# Patient Record
Sex: MALE | Race: WHITE
[De-identification: names, ages, dates, MRNs, and addresses within clinical notes are randomized per-mention and may not be internally consistent; named-entity substitution may affect disease eponyms.]

---

## 2017-01-24 ENCOUNTER — HOSPITAL ENCOUNTER (OUTPATIENT)
Dept: HOSPITAL 53 - M LAB REF | Age: 82
End: 2017-01-24
Attending: INTERNAL MEDICINE
Payer: MEDICARE

## 2017-01-24 DIAGNOSIS — D46.9: Primary | ICD-10-CM

## 2017-01-24 LAB
FERRITIN SERPL-MCNC: 489 NG/ML (ref 26–388)
IRON SATN MFR SERPL: 44.5 % (ref 19.7–37.4)
TIBC SERPL-MCNC: 254 UG/DL (ref 250–450)

## 2017-04-18 ENCOUNTER — HOSPITAL ENCOUNTER (OUTPATIENT)
Dept: HOSPITAL 53 - M LABDRAW1 | Age: 82
End: 2017-04-18
Attending: FAMILY MEDICINE
Payer: MEDICARE

## 2017-04-18 DIAGNOSIS — E11.9: ICD-10-CM

## 2017-04-18 DIAGNOSIS — E55.9: Primary | ICD-10-CM

## 2017-04-18 LAB
ALBUMIN SERPL BCG-MCNC: 3.4 GM/DL (ref 3.2–5.2)
ALBUMIN/GLOB SERPL: 1.26 {RATIO} (ref 1–1.93)
ALP SERPL-CCNC: 64 U/L (ref 45–117)
ALT SERPL W P-5'-P-CCNC: 20 U/L (ref 12–78)
ANION GAP SERPL CALC-SCNC: 6 MEQ/L (ref 8–16)
AST SERPL-CCNC: 18 U/L (ref 15–37)
BILIRUB SERPL-MCNC: 0.7 MG/DL (ref 0.2–1)
BUN SERPL-MCNC: 19 MG/DL (ref 7–18)
CALCIUM SERPL-MCNC: 8.2 MG/DL (ref 8.8–10.2)
CHLORIDE SERPL-SCNC: 107 MEQ/L (ref 98–107)
CHOLEST SERPL-MCNC: 198 MG/DL (ref ?–200)
CO2 SERPL-SCNC: 28 MEQ/L (ref 21–32)
CREAT SERPL-MCNC: 0.94 MG/DL (ref 0.7–1.3)
ERYTHROCYTE [DISTWIDTH] IN BLOOD BY AUTOMATED COUNT: 15.4 % (ref 11.5–14.5)
EST. AVERAGE GLUCOSE BLD GHB EST-MCNC: 120 MG/DL (ref 60–110)
GFR SERPL CREATININE-BSD FRML MDRD: > 60 ML/MIN/{1.73_M2} (ref 35–?)
GLUCOSE SERPL-MCNC: 126 MG/DL (ref 83–110)
MCH RBC QN AUTO: 33.4 PG (ref 27–33)
MCHC RBC AUTO-ENTMCNC: 32.6 G/DL (ref 32–36.5)
MCV RBC AUTO: 102.4 FL (ref 80–96)
PLATELET # BLD AUTO: 179 K/MM3 (ref 150–450)
POTASSIUM SERPL-SCNC: 4.2 MEQ/L (ref 3.5–5.1)
PROT SERPL-MCNC: 6.1 GM/DL (ref 6.4–8.2)
SODIUM SERPL-SCNC: 141 MEQ/L (ref 136–145)
TRIGL SERPL-MCNC: 115 MG/DL (ref ?–150)
WBC # BLD AUTO: 7.4 K/MM3 (ref 4–10)

## 2017-07-19 ENCOUNTER — HOSPITAL ENCOUNTER (OUTPATIENT)
Dept: HOSPITAL 53 - M LAB REF | Age: 82
End: 2017-07-19
Attending: INTERNAL MEDICINE
Payer: MEDICARE

## 2017-07-19 DIAGNOSIS — D46.9: Primary | ICD-10-CM

## 2017-07-19 LAB
FERRITIN SERPL-MCNC: 447 NG/ML (ref 26–388)
IRON SATN MFR SERPL: 33.5 % (ref 19.7–37.4)
TIBC SERPL-MCNC: 245 UG/DL (ref 250–450)

## 2017-10-19 ENCOUNTER — HOSPITAL ENCOUNTER (OUTPATIENT)
Dept: HOSPITAL 53 - M LAB REF | Age: 82
End: 2017-10-19
Attending: INTERNAL MEDICINE
Payer: MEDICARE

## 2017-10-19 DIAGNOSIS — D46.9: Primary | ICD-10-CM

## 2017-10-19 LAB
FERRITIN SERPL-MCNC: 531 NG/ML (ref 26–388)
IRON SATN MFR SERPL: 35.3 % (ref 19.7–50)
TIBC SERPL-MCNC: 241 UG/DL (ref 250–450)

## 2018-05-15 ENCOUNTER — HOSPITAL ENCOUNTER (OUTPATIENT)
Dept: HOSPITAL 53 - M LABDRAW1 | Age: 83
End: 2018-05-15
Attending: FAMILY MEDICINE
Payer: MEDICARE

## 2018-05-15 DIAGNOSIS — E11.9: ICD-10-CM

## 2018-05-15 DIAGNOSIS — E55.9: Primary | ICD-10-CM

## 2018-05-15 DIAGNOSIS — C61: ICD-10-CM

## 2018-05-15 LAB
25(OH)D3 SERPL-MCNC: 80.5 NG/ML (ref 30–100)
ALBUMIN/GLOBULIN RATIO: 1.23 (ref 1–1.93)
ALBUMIN: 3.2 GM/DL (ref 3.2–5.2)
ALKALINE PHOSPHATASE: 56 U/L (ref 45–117)
ALT SERPL W P-5'-P-CCNC: 22 U/L (ref 12–78)
ANION GAP: 6 MEQ/L (ref 8–16)
AST SERPL-CCNC: 19 U/L (ref 7–37)
BASO #: 0 10^3/UL (ref 0–0.2)
BASO %: 0.2 % (ref 0–1)
BILIRUBIN,TOTAL: 0.8 MG/DL (ref 0.2–1)
BLOOD UREA NITROGEN: 28 MG/DL (ref 7–18)
CALCIUM LEVEL: 8.2 MG/DL (ref 8.8–10.2)
CARBON DIOXIDE LEVEL: 28 MEQ/L (ref 21–32)
CHLORIDE LEVEL: 108 MEQ/L (ref 98–107)
CREATININE FOR GFR: 1.06 MG/DL (ref 0.7–1.3)
EOS #: 0 10^3/UL (ref 0–0.5)
EOSINOPHIL NFR BLD AUTO: 0 % (ref 0–3)
EST. AVERAGE GLUCOSE BLD GHB EST-MCNC: 108 MG/DL (ref 60–110)
GFR SERPL CREATININE-BSD FRML MDRD: > 60 ML/MIN/{1.73_M2} (ref 35–?)
GLUCOSE, FASTING: 95 MG/DL (ref 70–100)
HEMATOCRIT: 26.2 % (ref 42–52)
HEMOGLOBIN: 8.9 G/DL (ref 13.5–17.5)
IMMATURE GRANULOCYTE %: 1.5 % (ref 0–3)
LYMPH #: 1.5 10^3/UL (ref 1.5–4.5)
LYMPH %: 27.3 % (ref 24–44)
MEAN CORPUSCULAR HEMOGLOBIN: 34 PG (ref 27–33)
MEAN CORPUSCULAR HGB CONC: 34 G/DL (ref 32–36.5)
MEAN CORPUSCULAR VOLUME: 100 FL (ref 80–96)
MONO #: 0.5 10^3/UL (ref 0–0.8)
MONO %: 9.2 % (ref 0–5)
NEUTROPHILS #: 3.3 10^3/UL (ref 1.8–7.7)
NEUTROPHILS %: 61.8 % (ref 36–66)
NRBC BLD AUTO-RTO: 0 % (ref 0–0)
PLATELET COUNT, AUTOMATED: 132 10^3/UL (ref 150–450)
POTASSIUM SERUM: 4.4 MEQ/L (ref 3.5–5.1)
PROSTATIC SPECIFIC AG MONITOR: < 0.01 NG/ML (ref ?–4)
RED BLOOD COUNT: 2.62 10^6/UL (ref 4.3–6.1)
RED CELL DISTRIBUTION WIDTH: 15.9 % (ref 11.5–14.5)
SODIUM LEVEL: 142 MEQ/L (ref 136–145)
TOTAL PROTEIN: 5.8 GM/DL (ref 6.4–8.2)
WHITE BLOOD COUNT: 5.3 10^3/UL (ref 4–10)

## 2018-05-15 PROCEDURE — 80053 COMPREHEN METABOLIC PANEL: CPT

## 2018-06-12 ENCOUNTER — HOSPITAL ENCOUNTER (OUTPATIENT)
Dept: HOSPITAL 53 - M LAB REF | Age: 83
End: 2018-06-12
Attending: INTERNAL MEDICINE
Payer: MEDICARE

## 2018-06-12 DIAGNOSIS — D50.9: Primary | ICD-10-CM

## 2018-06-12 LAB
FERRITIN: 434 NG/ML (ref 26–388)
IRON (FE): 80 UG/DL (ref 65–175)
IRON SATN MFR SERPL: 33.9 % (ref 19.7–50)
TOTAL IRON BINDING CAPACITY: 236 UG/DL (ref 250–450)

## 2018-06-12 PROCEDURE — 83550 IRON BINDING TEST: CPT

## 2018-08-16 ENCOUNTER — HOSPITAL ENCOUNTER (OUTPATIENT)
Dept: HOSPITAL 53 - M LAB REF | Age: 83
End: 2018-08-16
Attending: SURGERY
Payer: MEDICARE

## 2018-08-16 DIAGNOSIS — C44.229: Primary | ICD-10-CM

## 2018-08-16 PROCEDURE — 88305 TISSUE EXAM BY PATHOLOGIST: CPT

## 2018-12-04 ENCOUNTER — HOSPITAL ENCOUNTER (OUTPATIENT)
Dept: HOSPITAL 53 - M OPP | Age: 83
Discharge: HOME | End: 2018-12-04
Attending: SURGERY
Payer: MEDICARE

## 2018-12-04 DIAGNOSIS — Z12.11: Primary | ICD-10-CM

## 2018-12-04 DIAGNOSIS — K57.30: ICD-10-CM

## 2018-12-04 RX ADMIN — SODIUM CHLORIDE 1 MLS/HR: 9 INJECTION, SOLUTION INTRAVENOUS at 10:10

## 2018-12-06 ENCOUNTER — HOSPITAL ENCOUNTER (OUTPATIENT)
Dept: HOSPITAL 53 - M LABDRAW1 | Age: 83
End: 2018-12-06
Attending: FAMILY MEDICINE
Payer: MEDICARE

## 2018-12-06 DIAGNOSIS — E11.9: ICD-10-CM

## 2018-12-06 DIAGNOSIS — C61: ICD-10-CM

## 2018-12-06 DIAGNOSIS — E55.9: Primary | ICD-10-CM

## 2018-12-06 LAB
25(OH)D3 SERPL-MCNC: 94.2 NG/ML (ref 30–100)
ALBUMIN/GLOBULIN RATIO: 1.3 (ref 1–1.93)
ALBUMIN: 3.5 GM/DL (ref 3.2–5.2)
ALKALINE PHOSPHATASE: 55 U/L (ref 45–117)
ALT SERPL W P-5'-P-CCNC: 20 U/L (ref 12–78)
ANION GAP: 6 MEQ/L (ref 8–16)
AST SERPL-CCNC: 17 U/L (ref 7–37)
BASO #: 0 10^3/UL (ref 0–0.2)
BASO %: 0.1 % (ref 0–1)
BILIRUBIN,TOTAL: 0.8 MG/DL (ref 0.2–1)
BLOOD UREA NITROGEN: 27 MG/DL (ref 7–18)
CALCIUM LEVEL: 8.1 MG/DL (ref 8.8–10.2)
CARBON DIOXIDE LEVEL: 29 MEQ/L (ref 21–32)
CHLORIDE LEVEL: 108 MEQ/L (ref 98–107)
CHOLEST SERPL-MCNC: 205 MG/DL (ref ?–200)
CHOLESTEROL RISK RATIO: 3.25 (ref ?–5)
CREAT UR-MCNC: 161 MG/DL
CREATININE FOR GFR: 1.3 MG/DL (ref 0.7–1.3)
EOS #: 0 10^3/UL (ref 0–0.5)
EOSINOPHIL NFR BLD AUTO: 0 % (ref 0–3)
EST. AVERAGE GLUCOSE BLD GHB EST-MCNC: 134 MG/DL (ref 60–110)
GFR SERPL CREATININE-BSD FRML MDRD: 56.1 ML/MIN/{1.73_M2} (ref 35–?)
GLUCOSE, FASTING: 108 MG/DL (ref 70–100)
HDLC SERPL-MCNC: 63 MG/DL (ref 40–?)
HEMATOCRIT: 29.1 % (ref 42–52)
HEMOGLOBIN: 9.7 G/DL (ref 13.5–17.5)
IMMATURE GRANULOCYTE %: 1.2 % (ref 0–3)
LDL CHOLESTEROL: 114 MG/DL (ref ?–100)
LYMPH #: 2.2 10^3/UL (ref 1.5–4.5)
LYMPH %: 28.4 % (ref 24–44)
MEAN CORPUSCULAR HEMOGLOBIN: 34.4 PG (ref 27–33)
MEAN CORPUSCULAR HGB CONC: 33.3 G/DL (ref 32–36.5)
MEAN CORPUSCULAR VOLUME: 103.2 FL (ref 80–96)
MICROALBUMIN UR-MCNC: 1940 MG/L
MICROALBUMIN/CREAT UR: 1204.9 MCG/MG (ref 0–30)
MONO #: 0.6 10^3/UL (ref 0–0.8)
MONO %: 8.3 % (ref 0–5)
NEUTROPHILS #: 4.8 10^3/UL (ref 1.8–7.7)
NEUTROPHILS %: 62 % (ref 36–66)
NONHDLC SERPL-MCNC: 142 MG/DL
NRBC BLD AUTO-RTO: 0 % (ref 0–0)
PLATELET COUNT, AUTOMATED: 176 10^3/UL (ref 150–450)
POTASSIUM SERUM: 4.1 MEQ/L (ref 3.5–5.1)
PROSTATIC SPECIFIC AG MONITOR: < 0 NG/ML (ref ?–4)
RED BLOOD COUNT: 2.82 10^6/UL (ref 4.3–6.1)
RED CELL DISTRIBUTION WIDTH: 15 % (ref 11.5–14.5)
SODIUM LEVEL: 143 MEQ/L (ref 136–145)
TOTAL PROTEIN: 6.2 GM/DL (ref 6.4–8.2)
TRIGLYCERIDES LEVEL: 142 MG/DL (ref ?–150)
WHITE BLOOD COUNT: 7.7 10^3/UL (ref 4–10)

## 2018-12-06 PROCEDURE — 80053 COMPREHEN METABOLIC PANEL: CPT

## 2019-06-05 ENCOUNTER — HOSPITAL ENCOUNTER (OUTPATIENT)
Dept: HOSPITAL 53 - M LABDRAW1 | Age: 84
End: 2019-06-05
Attending: FAMILY MEDICINE
Payer: MEDICARE

## 2019-06-05 DIAGNOSIS — E11.9: ICD-10-CM

## 2019-06-05 DIAGNOSIS — E55.9: Primary | ICD-10-CM

## 2019-06-05 LAB
25(OH)D3 SERPL-MCNC: 94 NG/ML (ref 30–100)
ALBUMIN SERPL BCG-MCNC: 3.1 GM/DL (ref 3.2–5.2)
ALT SERPL W P-5'-P-CCNC: 22 U/L (ref 12–78)
BASOPHILS # BLD AUTO: 0 10^3/UL (ref 0–0.2)
BASOPHILS NFR BLD AUTO: 0.2 % (ref 0–1)
BILIRUB SERPL-MCNC: 0.6 MG/DL (ref 0.2–1)
BUN SERPL-MCNC: 31 MG/DL (ref 7–18)
CALCIUM SERPL-MCNC: 8.3 MG/DL (ref 8.8–10.2)
CHLORIDE SERPL-SCNC: 108 MEQ/L (ref 98–107)
CHOLEST SERPL-MCNC: 200 MG/DL (ref ?–200)
CHOLEST/HDLC SERPL: 2.82 {RATIO} (ref ?–5)
CO2 SERPL-SCNC: 29 MEQ/L (ref 21–32)
CREAT SERPL-MCNC: 1.11 MG/DL (ref 0.7–1.3)
CREAT UR-MCNC: 92 MG/DL
EOSINOPHIL # BLD AUTO: 0 10^3/UL (ref 0–0.5)
EOSINOPHIL NFR BLD AUTO: 0 % (ref 0–3)
EST. AVERAGE GLUCOSE BLD GHB EST-MCNC: 117 MG/DL (ref 60–110)
GFR SERPL CREATININE-BSD FRML MDRD: > 60 ML/MIN/{1.73_M2} (ref 35–?)
GLUCOSE SERPL-MCNC: 117 MG/DL (ref 70–100)
HCT VFR BLD AUTO: 28.3 % (ref 42–52)
HDLC SERPL-MCNC: 71 MG/DL (ref 40–?)
HGB BLD-MCNC: 9.6 G/DL (ref 13.5–17.5)
LDLC SERPL CALC-MCNC: 106 MG/DL (ref ?–100)
LYMPHOCYTES # BLD AUTO: 1.5 10^3/UL (ref 1.5–4.5)
LYMPHOCYTES NFR BLD AUTO: 24.5 % (ref 24–44)
MCH RBC QN AUTO: 34.9 PG (ref 27–33)
MCHC RBC AUTO-ENTMCNC: 33.9 G/DL (ref 32–36.5)
MCV RBC AUTO: 102.9 FL (ref 80–96)
MICROALBUMIN/CREAT UR: 3000 MCG/MG (ref 0–30)
MONOCYTES # BLD AUTO: 0.6 10^3/UL (ref 0–0.8)
MONOCYTES NFR BLD AUTO: 9.7 % (ref 0–5)
NEUTROPHILS # BLD AUTO: 3.8 10^3/UL (ref 1.8–7.7)
NEUTROPHILS NFR BLD AUTO: 63.9 % (ref 36–66)
NONHDLC SERPL-MCNC: 129 MG/DL
PLATELET # BLD AUTO: 137 10^3/UL (ref 150–450)
POTASSIUM SERPL-SCNC: 4.4 MEQ/L (ref 3.5–5.1)
PROT SERPL-MCNC: 6 GM/DL (ref 6.4–8.2)
RBC # BLD AUTO: 2.75 10^6/UL (ref 4.3–6.1)
SODIUM SERPL-SCNC: 143 MEQ/L (ref 136–145)
TRIGL SERPL-MCNC: 113 MG/DL (ref ?–150)
WBC # BLD AUTO: 6 10^3/UL (ref 4–10)

## 2019-12-18 ENCOUNTER — HOSPITAL ENCOUNTER (OUTPATIENT)
Dept: HOSPITAL 53 - M LABDRAW1 | Age: 84
End: 2019-12-18
Attending: FAMILY MEDICINE
Payer: MEDICARE

## 2019-12-18 DIAGNOSIS — E78.00: ICD-10-CM

## 2019-12-18 DIAGNOSIS — E55.9: Primary | ICD-10-CM

## 2019-12-18 DIAGNOSIS — R73.01: ICD-10-CM

## 2019-12-18 LAB
25(OH)D3 SERPL-MCNC: 72.3 NG/ML (ref 30–100)
ALBUMIN SERPL BCG-MCNC: 3.2 GM/DL (ref 3.2–5.2)
ALT SERPL W P-5'-P-CCNC: 22 U/L (ref 12–78)
BASOPHILS # BLD AUTO: 0 10^3/UL (ref 0–0.2)
BASOPHILS NFR BLD AUTO: 0.2 % (ref 0–1)
BILIRUB SERPL-MCNC: 0.9 MG/DL (ref 0.2–1)
BUN SERPL-MCNC: 32 MG/DL (ref 7–18)
CALCIUM SERPL-MCNC: 8.9 MG/DL (ref 8.8–10.2)
CHLORIDE SERPL-SCNC: 103 MEQ/L (ref 98–107)
CHOLEST SERPL-MCNC: 223 MG/DL (ref ?–200)
CHOLEST/HDLC SERPL: 3.38 {RATIO} (ref ?–5)
CO2 SERPL-SCNC: 30 MEQ/L (ref 21–32)
CREAT SERPL-MCNC: 1.15 MG/DL (ref 0.7–1.3)
CREAT UR-MCNC: 78.5 MG/DL
EOSINOPHIL # BLD AUTO: 0 10^3/UL (ref 0–0.5)
EOSINOPHIL NFR BLD AUTO: 0 % (ref 0–3)
EST. AVERAGE GLUCOSE BLD GHB EST-MCNC: 128 MG/DL (ref 60–110)
GFR SERPL CREATININE-BSD FRML MDRD: > 60 ML/MIN/{1.73_M2} (ref 35–?)
GLUCOSE SERPL-MCNC: 128 MG/DL (ref 70–100)
HCT VFR BLD AUTO: 36.1 % (ref 42–52)
HDLC SERPL-MCNC: 66 MG/DL (ref 40–?)
HGB BLD-MCNC: 11.4 G/DL (ref 13.5–17.5)
LDLC SERPL CALC-MCNC: 116 MG/DL (ref ?–100)
LYMPHOCYTES # BLD AUTO: 2.3 10^3/UL (ref 1.5–5)
LYMPHOCYTES NFR BLD AUTO: 26.7 % (ref 24–44)
MCH RBC QN AUTO: 32.2 PG (ref 27–33)
MCHC RBC AUTO-ENTMCNC: 31.6 G/DL (ref 32–36.5)
MCV RBC AUTO: 102 FL (ref 80–96)
MICROALBUMIN UR-MCNC: 2940 MG/L
MICROALBUMIN/CREAT UR: 3745.2 MCG/MG (ref 0–30)
MONOCYTES # BLD AUTO: 0.8 10^3/UL (ref 0–0.8)
MONOCYTES NFR BLD AUTO: 9.6 % (ref 0–5)
NEUTROPHILS # BLD AUTO: 5.3 10^3/UL (ref 1.5–8.5)
NEUTROPHILS NFR BLD AUTO: 61.3 % (ref 36–66)
NONHDLC SERPL-MCNC: 157 MG/DL
PLATELET # BLD AUTO: 183 10^3/UL (ref 150–450)
POTASSIUM SERPL-SCNC: 4.5 MEQ/L (ref 3.5–5.1)
PROT SERPL-MCNC: 6.3 GM/DL (ref 6.4–8.2)
RBC # BLD AUTO: 3.54 10^6/UL (ref 4.3–6.1)
SODIUM SERPL-SCNC: 141 MEQ/L (ref 136–145)
TRIGL SERPL-MCNC: 205 MG/DL (ref ?–150)
WBC # BLD AUTO: 8.7 10^3/UL (ref 4–10)

## 2020-04-20 ENCOUNTER — HOSPITAL ENCOUNTER (OUTPATIENT)
Dept: HOSPITAL 53 - M LAB | Age: 85
End: 2020-04-20
Attending: INTERNAL MEDICINE
Payer: MEDICARE

## 2020-04-20 DIAGNOSIS — D46.9: Primary | ICD-10-CM

## 2020-04-20 LAB
BASOPHILS # BLD AUTO: 0 10^3/UL (ref 0–0.2)
BASOPHILS NFR BLD AUTO: 0.2 % (ref 0–1)
EOSINOPHIL # BLD AUTO: 0 10^3/UL (ref 0–0.5)
EOSINOPHIL NFR BLD AUTO: 0 % (ref 0–3)
HCT VFR BLD AUTO: 30.3 % (ref 42–52)
HGB BLD-MCNC: 9.8 G/DL (ref 13.5–17.5)
LYMPHOCYTES # BLD AUTO: 1.8 10^3/UL (ref 1.5–5)
LYMPHOCYTES NFR BLD AUTO: 28.1 % (ref 24–44)
MCH RBC QN AUTO: 32.9 PG (ref 27–33)
MCHC RBC AUTO-ENTMCNC: 32.3 G/DL (ref 32–36.5)
MCV RBC AUTO: 101.7 FL (ref 80–96)
MONOCYTES # BLD AUTO: 0.8 10^3/UL (ref 0–0.8)
MONOCYTES NFR BLD AUTO: 12.9 % (ref 0–5)
NEUTROPHILS # BLD AUTO: 3.7 10^3/UL (ref 1.5–8.5)
NEUTROPHILS NFR BLD AUTO: 57.5 % (ref 36–66)
PLATELET # BLD AUTO: 146 10^3/UL (ref 150–450)
RBC # BLD AUTO: 2.98 10^6/UL (ref 4.3–6.1)
WBC # BLD AUTO: 6.4 10^3/UL (ref 4–10)

## 2020-05-05 ENCOUNTER — HOSPITAL ENCOUNTER (OUTPATIENT)
Dept: HOSPITAL 53 - M RAD | Age: 85
End: 2020-05-05
Attending: INTERNAL MEDICINE
Payer: MEDICARE

## 2020-05-05 DIAGNOSIS — N28.9: ICD-10-CM

## 2020-05-05 DIAGNOSIS — I71.4: Primary | ICD-10-CM

## 2020-05-05 DIAGNOSIS — K82.8: ICD-10-CM

## 2020-05-05 DIAGNOSIS — D46.9: ICD-10-CM

## 2020-05-05 NOTE — REP
REASON FOR EXAM: Abdominal pain.

 

There are no priors for comparison.

 

Multiple ultrasonographic images of the gallbladder show diffuse low-level echoes

within the gallbladder lumen which are mobile.  There are no echogenic foci which

casts acoustic shadows.  There is no gallbladder wall thickening or

pericholecystic edema.  The maximum common bile duct dimension is 6 mm.

 

Multiple ultrasonographic images of the liver show an anechoic 9 mm sized

structure in the right lobe.  This exhibits posterior wall enhancement and

increased through transmission.

 

Multiple ultrasonographic images of the pancreas show the pancreas to be seen in

a markedly limited fashion due to the patient's intestinal gas pattern.  No gross

abnormalities were identified.

 

The maximal splenic dimension is 10 cm with a volumetric index calculation of

306, which is within normal limits. No splenic or perisplenic abnormalities are

noted.

 

The right kidney measures 10.5 x 5.2 x 5.1 cm and is within normal limits.

 

The left kidney measures 10.4 x 4.7 x 4 cm.  Two hypoechoic nearly anechoic

structures are seen in the left kidney, the largest measures 2.3 x 2 x 1.5 cm and

the smaller of the two 7 mm.  The larger of the two does exhibit some increased

through transmission without radha posterior wall enhancement.

 

The maximal dimension if the aorta measured anterior to posterior in the

longitudinal plane is 4.1 cm.

 

There is no free fluid in the abdomen.

 

IMPRESSION:

 

1.  There is an abdominal aortic aneurysm in the mid portion of the aorta.  Since

the patient has stents within the aorta as per history 10 years ago, CT is

recommended for further evaluation.

 

2.  Two hypoechoic areas in the right kidney possibly representing hyperdense or

complex cysts.  Pre and post contrast enhanced renal CT is recommended for

complete evaluation.  This could be performed at the same time the abdominal

aorta is being evaluated by CT.

 

3.  There is sludge within the gallbladder.

 

4.  Other findings as described above.

 

 

Electronically Signed by

Gary Renteria DO 05/05/2020 12:09 P

## 2020-05-08 ENCOUNTER — HOSPITAL ENCOUNTER (OUTPATIENT)
Dept: HOSPITAL 53 - M LAB | Age: 85
End: 2020-05-08
Attending: INTERNAL MEDICINE
Payer: MEDICARE

## 2020-05-08 DIAGNOSIS — D46.9: Primary | ICD-10-CM

## 2020-05-08 LAB
ALBUMIN SERPL BCG-MCNC: 3.2 GM/DL (ref 3.2–5.2)
ALT SERPL W P-5'-P-CCNC: 21 U/L (ref 12–78)
BASOPHILS # BLD AUTO: 0 10^3/UL (ref 0–0.2)
BASOPHILS NFR BLD AUTO: 0.2 % (ref 0–1)
BILIRUB SERPL-MCNC: 0.7 MG/DL (ref 0.2–1)
BUN SERPL-MCNC: 29 MG/DL (ref 7–18)
CALCIUM SERPL-MCNC: 8.7 MG/DL (ref 8.8–10.2)
CHLORIDE SERPL-SCNC: 108 MEQ/L (ref 98–107)
CO2 SERPL-SCNC: 30 MEQ/L (ref 21–32)
CREAT SERPL-MCNC: 1.1 MG/DL (ref 0.7–1.3)
EOSINOPHIL # BLD AUTO: 0 10^3/UL (ref 0–0.5)
EOSINOPHIL NFR BLD AUTO: 0 % (ref 0–3)
FERRITIN SERPL-MCNC: 365 NG/ML (ref 26–388)
GFR SERPL CREATININE-BSD FRML MDRD: > 60 ML/MIN/{1.73_M2} (ref 35–?)
GLUCOSE SERPL-MCNC: 120 MG/DL (ref 70–100)
HCT VFR BLD AUTO: 32 % (ref 42–52)
HGB BLD-MCNC: 10.3 G/DL (ref 13.5–17.5)
IRON SATN MFR SERPL: 34.3 % (ref 19.7–50)
IRON SERPL-MCNC: 86 UG/DL (ref 65–175)
LYMPHOCYTES # BLD AUTO: 1.6 10^3/UL (ref 1.5–5)
LYMPHOCYTES NFR BLD AUTO: 27 % (ref 24–44)
MCH RBC QN AUTO: 32.2 PG (ref 27–33)
MCHC RBC AUTO-ENTMCNC: 32.2 G/DL (ref 32–36.5)
MCV RBC AUTO: 100 FL (ref 80–96)
MONOCYTES # BLD AUTO: 0.6 10^3/UL (ref 0–0.8)
MONOCYTES NFR BLD AUTO: 10.7 % (ref 0–5)
NEUTROPHILS # BLD AUTO: 3.6 10^3/UL (ref 1.5–8.5)
NEUTROPHILS NFR BLD AUTO: 60.8 % (ref 36–66)
PLATELET # BLD AUTO: 160 10^3/UL (ref 150–450)
POTASSIUM SERPL-SCNC: 4.8 MEQ/L (ref 3.5–5.1)
PROT SERPL-MCNC: 6.3 GM/DL (ref 6.4–8.2)
RBC # BLD AUTO: 3.2 10^6/UL (ref 4.3–6.1)
SODIUM SERPL-SCNC: 142 MEQ/L (ref 136–145)
TIBC SERPL-MCNC: 251 UG/DL (ref 250–450)
WBC # BLD AUTO: 6 10^3/UL (ref 4–10)

## 2020-06-19 ENCOUNTER — HOSPITAL ENCOUNTER (OUTPATIENT)
Dept: HOSPITAL 53 - M PLALAB | Age: 85
End: 2020-06-19
Attending: FAMILY MEDICINE
Payer: MEDICARE

## 2020-06-19 DIAGNOSIS — E11.9: ICD-10-CM

## 2020-06-19 DIAGNOSIS — E55.9: Primary | ICD-10-CM

## 2020-06-19 DIAGNOSIS — C61: ICD-10-CM

## 2020-06-19 LAB
25(OH)D3 SERPL-MCNC: 80.3 NG/ML (ref 30–100)
ALBUMIN SERPL BCG-MCNC: 3.4 GM/DL (ref 3.2–5.2)
ALT SERPL W P-5'-P-CCNC: 18 U/L (ref 12–78)
BILIRUB SERPL-MCNC: 0.9 MG/DL (ref 0.2–1)
BUN SERPL-MCNC: 34 MG/DL (ref 7–18)
CALCIUM SERPL-MCNC: 8.8 MG/DL (ref 8.8–10.2)
CHLORIDE SERPL-SCNC: 107 MEQ/L (ref 98–107)
CO2 SERPL-SCNC: 30 MEQ/L (ref 21–32)
CREAT SERPL-MCNC: 1.14 MG/DL (ref 0.7–1.3)
EST. AVERAGE GLUCOSE BLD GHB EST-MCNC: 128 MG/DL (ref 60–110)
GFR SERPL CREATININE-BSD FRML MDRD: > 60 ML/MIN/{1.73_M2} (ref 35–?)
GLUCOSE SERPL-MCNC: 99 MG/DL (ref 70–100)
POTASSIUM SERPL-SCNC: 5 MEQ/L (ref 3.5–5.1)
PROT SERPL-MCNC: 6.2 GM/DL (ref 6.4–8.2)
PSA SERPL-MCNC: < 0.01 NG/ML (ref ?–4)
SODIUM SERPL-SCNC: 139 MEQ/L (ref 136–145)

## 2021-01-04 ENCOUNTER — HOSPITAL ENCOUNTER (OUTPATIENT)
Dept: HOSPITAL 53 - M LABSMTC | Age: 86
End: 2021-01-04
Attending: PEDIATRICS
Payer: SELF-PAY

## 2021-01-04 DIAGNOSIS — Z20.828: Primary | ICD-10-CM

## 2021-01-06 ENCOUNTER — HOSPITAL ENCOUNTER (OUTPATIENT)
Dept: HOSPITAL 53 - M PLALAB | Age: 86
End: 2021-01-06
Attending: FAMILY MEDICINE
Payer: MEDICARE

## 2021-01-06 DIAGNOSIS — C61: Primary | ICD-10-CM

## 2021-01-06 DIAGNOSIS — E11.9: ICD-10-CM

## 2021-01-06 LAB
ALBUMIN SERPL BCG-MCNC: 3.6 GM/DL (ref 3.2–5.2)
ALT SERPL W P-5'-P-CCNC: 20 U/L (ref 12–78)
BILIRUB SERPL-MCNC: 0.8 MG/DL (ref 0.2–1)
BUN SERPL-MCNC: 34 MG/DL (ref 7–18)
CALCIUM SERPL-MCNC: 8.7 MG/DL (ref 8.8–10.2)
CHLORIDE SERPL-SCNC: 104 MEQ/L (ref 98–107)
CHOLEST SERPL-MCNC: 202 MG/DL (ref ?–200)
CHOLEST/HDLC SERPL: 3.88 {RATIO} (ref ?–5)
CO2 SERPL-SCNC: 30 MEQ/L (ref 21–32)
CREAT SERPL-MCNC: 1.26 MG/DL (ref 0.7–1.3)
EST. AVERAGE GLUCOSE BLD GHB EST-MCNC: 123 MG/DL (ref 60–110)
GFR SERPL CREATININE-BSD FRML MDRD: 57.9 ML/MIN/{1.73_M2} (ref 35–?)
GLUCOSE SERPL-MCNC: 121 MG/DL (ref 70–100)
HCT VFR BLD AUTO: 37.6 % (ref 42–52)
HDLC SERPL-MCNC: 52 MG/DL (ref 40–?)
HGB BLD-MCNC: 11.8 G/DL (ref 13.5–17.5)
LDLC SERPL CALC-MCNC: 107 MG/DL (ref ?–100)
MCH RBC QN AUTO: 32.8 PG (ref 27–33)
MCHC RBC AUTO-ENTMCNC: 31.4 G/DL (ref 32–36.5)
MCV RBC AUTO: 104.4 FL (ref 80–96)
NONHDLC SERPL-MCNC: 150 MG/DL
PLATELET # BLD AUTO: 199 10^3/UL (ref 150–450)
POTASSIUM SERPL-SCNC: 4.9 MEQ/L (ref 3.5–5.1)
PROT SERPL-MCNC: 6.1 GM/DL (ref 6.4–8.2)
PSA SERPL-MCNC: < 0.01 NG/ML (ref ?–4)
RBC # BLD AUTO: 3.6 10^6/UL (ref 4.3–6.1)
SODIUM SERPL-SCNC: 138 MEQ/L (ref 136–145)
TRIGL SERPL-MCNC: 214 MG/DL (ref ?–150)
WBC # BLD AUTO: 6.6 10^3/UL (ref 4–10)

## 2021-03-29 ENCOUNTER — HOSPITAL ENCOUNTER (OUTPATIENT)
Dept: HOSPITAL 53 - M PLALAB | Age: 86
End: 2021-03-29
Attending: INTERNAL MEDICINE
Payer: MEDICARE

## 2021-03-29 DIAGNOSIS — R15.1: ICD-10-CM

## 2021-03-29 DIAGNOSIS — R19.7: ICD-10-CM

## 2021-03-29 DIAGNOSIS — R15.9: Primary | ICD-10-CM

## 2021-03-29 LAB
CRP SERPL-MCNC: < 0.3 MG/DL (ref 0–0.3)
IGA SERPL-MCNC: 121 MG/DL (ref 70–400)
T4 FREE SERPL-MCNC: 0.87 NG/DL (ref 0.76–1.46)
TSH SERPL DL<=0.005 MIU/L-ACNC: 3.63 UIU/ML (ref 0.36–3.74)

## 2021-04-05 ENCOUNTER — HOSPITAL ENCOUNTER (OUTPATIENT)
Dept: HOSPITAL 53 - M LAB REF | Age: 86
End: 2021-04-05
Attending: INTERNAL MEDICINE
Payer: MEDICARE

## 2021-04-05 DIAGNOSIS — R19.7: ICD-10-CM

## 2021-04-05 DIAGNOSIS — R15.1: ICD-10-CM

## 2021-04-05 DIAGNOSIS — R15.9: Primary | ICD-10-CM

## 2021-08-05 ENCOUNTER — HOSPITAL ENCOUNTER (OUTPATIENT)
Dept: HOSPITAL 53 - M LAB REF | Age: 86
End: 2021-08-05
Attending: SURGERY
Payer: MEDICARE

## 2021-08-05 DIAGNOSIS — C44.329: Primary | ICD-10-CM

## 2021-09-03 ENCOUNTER — HOSPITAL ENCOUNTER (OUTPATIENT)
Dept: HOSPITAL 53 - M PLALAB | Age: 86
End: 2021-09-03
Attending: FAMILY MEDICINE
Payer: MEDICARE

## 2021-09-03 DIAGNOSIS — E78.00: ICD-10-CM

## 2021-09-03 DIAGNOSIS — E55.9: Primary | ICD-10-CM

## 2021-09-03 DIAGNOSIS — Z12.5: ICD-10-CM

## 2021-09-03 LAB
25(OH)D3 SERPL-MCNC: 71.7 NG/ML (ref 30–100)
ALBUMIN SERPL BCG-MCNC: 3.1 GM/DL (ref 3.2–5.2)
ALT SERPL W P-5'-P-CCNC: 16 U/L (ref 12–78)
BILIRUB SERPL-MCNC: 0.7 MG/DL (ref 0.2–1)
BUN SERPL-MCNC: 33 MG/DL (ref 7–18)
CALCIUM SERPL-MCNC: 8.3 MG/DL (ref 8.8–10.2)
CHLORIDE SERPL-SCNC: 107 MEQ/L (ref 98–107)
CHOLEST SERPL-MCNC: 196 MG/DL (ref ?–200)
CHOLEST/HDLC SERPL: 5.16 {RATIO} (ref ?–5)
CO2 SERPL-SCNC: 30 MEQ/L (ref 21–32)
CREAT SERPL-MCNC: 1.22 MG/DL (ref 0.7–1.3)
EST. AVERAGE GLUCOSE BLD GHB EST-MCNC: 123 MG/DL (ref 60–110)
GFR SERPL CREATININE-BSD FRML MDRD: 60 ML/MIN/{1.73_M2} (ref 35–?)
GLUCOSE SERPL-MCNC: 109 MG/DL (ref 70–100)
HCT VFR BLD AUTO: 31.9 % (ref 42–52)
HDLC SERPL-MCNC: 38 MG/DL (ref 40–?)
HGB BLD-MCNC: 10.4 G/DL (ref 13.5–17.5)
LDLC SERPL CALC-MCNC: 108 MG/DL (ref ?–100)
MCH RBC QN AUTO: 34 PG (ref 27–33)
MCHC RBC AUTO-ENTMCNC: 32.6 G/DL (ref 32–36.5)
MCV RBC AUTO: 104.2 FL (ref 80–96)
NONHDLC SERPL-MCNC: 158 MG/DL
PLATELET # BLD AUTO: 149 10^3/UL (ref 150–450)
POTASSIUM SERPL-SCNC: 5.2 MEQ/L (ref 3.5–5.1)
PROT SERPL-MCNC: 5.9 GM/DL (ref 6.4–8.2)
RBC # BLD AUTO: 3.06 10^6/UL (ref 4.3–6.1)
SODIUM SERPL-SCNC: 139 MEQ/L (ref 136–145)
TRIGL SERPL-MCNC: 251 MG/DL (ref ?–150)
WBC # BLD AUTO: 5 10^3/UL (ref 4–10)

## 2021-09-03 PROCEDURE — 80061 LIPID PANEL: CPT

## 2021-09-03 PROCEDURE — 83036 HEMOGLOBIN GLYCOSYLATED A1C: CPT

## 2021-09-03 PROCEDURE — 36415 COLL VENOUS BLD VENIPUNCTURE: CPT

## 2021-09-03 PROCEDURE — 82306 VITAMIN D 25 HYDROXY: CPT

## 2021-09-03 PROCEDURE — 85027 COMPLETE CBC AUTOMATED: CPT

## 2021-09-03 PROCEDURE — 80053 COMPREHEN METABOLIC PANEL: CPT

## 2021-10-24 ENCOUNTER — HOSPITAL ENCOUNTER (EMERGENCY)
Dept: HOSPITAL 53 - M ED | Age: 86
Discharge: HOME | End: 2021-10-24
Payer: MEDICARE

## 2021-10-24 VITALS — WEIGHT: 154.1 LBS | HEIGHT: 71 IN | BODY MASS INDEX: 21.57 KG/M2

## 2021-10-24 VITALS — SYSTOLIC BLOOD PRESSURE: 127 MMHG | DIASTOLIC BLOOD PRESSURE: 76 MMHG

## 2021-10-24 DIAGNOSIS — I10: ICD-10-CM

## 2021-10-24 DIAGNOSIS — Z88.2: ICD-10-CM

## 2021-10-24 DIAGNOSIS — E78.9: ICD-10-CM

## 2021-10-24 DIAGNOSIS — I80.01: Primary | ICD-10-CM

## 2021-10-24 DIAGNOSIS — Z87.891: ICD-10-CM

## 2021-10-24 DIAGNOSIS — Z79.899: ICD-10-CM

## 2021-10-24 DIAGNOSIS — H40.9: ICD-10-CM

## 2021-10-24 DIAGNOSIS — Z86.2: ICD-10-CM

## 2021-10-24 DIAGNOSIS — E11.9: ICD-10-CM

## 2021-10-24 DIAGNOSIS — Z85.828: ICD-10-CM

## 2021-10-24 DIAGNOSIS — Z85.46: ICD-10-CM

## 2021-10-24 DIAGNOSIS — Z79.82: ICD-10-CM

## 2021-10-24 DIAGNOSIS — Z88.1: ICD-10-CM

## 2021-10-24 LAB
BASOPHILS # BLD AUTO: 0 10^3/UL (ref 0–0.2)
BASOPHILS NFR BLD AUTO: 0.2 % (ref 0–1)
BUN SERPL-MCNC: 34 MG/DL (ref 7–18)
CALCIUM SERPL-MCNC: 7.8 MG/DL (ref 8.8–10.2)
CHLORIDE SERPL-SCNC: 106 MEQ/L (ref 98–107)
CO2 SERPL-SCNC: 31 MEQ/L (ref 21–32)
CREAT SERPL-MCNC: 1.37 MG/DL (ref 0.7–1.3)
EOSINOPHIL # BLD AUTO: 0 10^3/UL (ref 0–0.5)
EOSINOPHIL NFR BLD AUTO: 0 % (ref 0–3)
GFR SERPL CREATININE-BSD FRML MDRD: 52.4 ML/MIN/{1.73_M2} (ref 35–?)
GLUCOSE SERPL-MCNC: 92 MG/DL (ref 70–100)
HCT VFR BLD AUTO: 29.9 % (ref 42–52)
HGB BLD-MCNC: 9.7 G/DL (ref 13.5–17.5)
LYMPHOCYTES # BLD AUTO: 1.4 10^3/UL (ref 1.5–5)
LYMPHOCYTES NFR BLD AUTO: 31.3 % (ref 24–44)
MCH RBC QN AUTO: 33.7 PG (ref 27–33)
MCHC RBC AUTO-ENTMCNC: 32.4 G/DL (ref 32–36.5)
MCV RBC AUTO: 103.8 FL (ref 80–96)
MONOCYTES # BLD AUTO: 0.7 10^3/UL (ref 0–0.8)
MONOCYTES NFR BLD AUTO: 14.6 % (ref 2–8)
NEUTROPHILS # BLD AUTO: 2.3 10^3/UL (ref 1.5–8.5)
NEUTROPHILS NFR BLD AUTO: 51.5 % (ref 36–66)
PLATELET # BLD AUTO: 133 10^3/UL (ref 150–450)
POTASSIUM SERPL-SCNC: 4.3 MEQ/L (ref 3.5–5.1)
RBC # BLD AUTO: 2.88 10^6/UL (ref 4.3–6.1)
SODIUM SERPL-SCNC: 141 MEQ/L (ref 136–145)
WBC # BLD AUTO: 4.5 10^3/UL (ref 4–10)

## 2021-10-24 NOTE — CCD
Continuity of Care Document (CCD)

                             Created on: 2021



Presotn Ivory

External Reference #: [id number]

: 1935

Sex: Male



Demographics





                          Address                   [address] [place] [address]  [address]

 

                          Home Phone                [phone]

 

                          Preferred Language        Unknown

 

                          Marital Status            Unknown

 

                          Sikhism Affiliation     Unknown

 

                          Race                      White

 

                          Ethnic Group              Not  or 





Author





                          Author                    [facility]

 

                          Organization              Unknown

 

                          Address                   [address] [facility] [address]

[place]  [address]



 

                          Phone                     [phone]







Care Team Providers





                    Care Team Member Name Role                Phone

 

                    Sadaf Solano N.P. AUTM                [phone]

 

                    Kei Smith M.D.   AUTM                [phone]







Problems





                    Active Problems     Provider            Date

 

                    Essential hypertension                     Onset: 2014

 

                          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

 Of Face Bhavik Ceron M.D.                                    Onset: 2014

 

                    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 Onset: 

2014

 

                    Screening for malignant neoplasm of colon DOROTHEA Kelly Onset: 2018

 

                    History of polyp of colon JEREL Kelly Onset: 

018







Social History





                Type            Date            Description     Comments

 

                Birth Sex                       Unknown          

 

                ETOH Use                        consumes 6 beers per week  

 

                Tobacco Use     Start: Unknown  Patient is a current smoker, smo

kes every day 1/2 PPD

FOR 22 YEARS 

 

                Recreational Drug Use                 Denies Drug Use  







Allergies, Adverse Reactions, Alerts





             Active Allergies Reaction     Severity     Comments     Date

 

             Sulfa        LIGHT HEADED                           2014







Medications





           Active Medications SIG        Qnty       Indications Ordering Provide

r Date

 

                          Glipizide ER                     2.5mg Tablets ER 24HR

                   1 PO 

Daily                                           Unknown         

 

                                        Amlodipine Besylate/Benazepril Hydrochlo

ride                     5-10mg Capsules

                  1 po at night                           Unknown      

0

 

                    Lipitor                     20mg Tablets                   1

 PO Every Other Day 

                                        Unknown             

 

             Atenolol                     25mg Tablets                   2 PO da

lópez                             

Unknown                                 

 

                    Tamsulosin HCL                     0.4mg Capsules           

        1 po qd             

90caps                                  Unknown             

 

                          Combigan                     0.2-0.5% Solution        

           1 drop ea eye 

bid                                             Unknown         

 

                          Travatan                     0.004% Solution          

         1 gtts Each Eye 

qd                                              Unknown         

 

             Procrit                      Solution                   1 Shot Q3-4

 Weeks                           

Unknown                                 

 

                          Vitamin D                     [id number] Capsules      

             1 po q weekly

                4caps                           Unknown         

 

           Aspirin                     81mg Tablets                   1 po qd   

                       Unknown    



 

             Cinnamon                     500mg Tablets                   2 PO D

aily                             

Unknown                                 

 

                          Gabapentin                     300mg Capsules         

          1 tab by mouth 

twice a day     45caps                          Unknown         

 

                          Creon                     [id number] Caps DR Peter staples                   2 

capsules  a day with meals                                 Unknown         







Immunizations





                                        Description

 

                                        No Information Available







Vital Signs





                Date            Vital           Result          Comment

 

                2021  1:33pm BP Systolic     138 mmHg         

 

                    BP Diastolic        66 mmHg              

 

                    Body Temperature    98.9 F             

 

                    Height              71 inches           5'11"

 

                    Weight              153.00 lb            

 

                    BMI (Body Mass Index) 21.3 kg/m2           

 

                    Ideal Body Weight   172 lb               

 

                    Weight              69.401 kg            

 

                    BSA (Body Surface Area) 1.88 m2              

 

                2021  1:01pm BP Systolic     173 mmHg         

 

                    BP Diastolic        62 mmHg              

 

                    Body Temperature    98.4 F             

 

                    Height              71 inches           5'11"

 

                    Weight              154.00 lb            

 

                    BMI (Body Mass Index) 21.5 kg/m2           

 

                    Ideal Body Weight   172 lb               

 

                    Weight              69.854 kg            

 

                    BSA (Body Surface Area) 1.89 m2              







Results





        Test    Acquired Date Facility Test    Result  H/L     Range   Note

 

                    Laboratory test finding 2021          [facility]

                                        [address] [place] [address]

           [phone] Pathology Request For Service (SEE NOTE)              

          1







                          1                         FINAL DIAGNOSIS



Left cheek, lesion, excision:

Skin with a few prominent hair follicles and actinic damage.

No malignancy is identified.

                                        2021 - 1105



CLINICAL DIAGNOSIS



SCC left cheek

                                        2021 - 1302



GROSS DIAGNOSIS



Received in formalin labeled "left cheek" is a 0.9 x 0.4 x 0.4 cm.

ellipsoid portion of skin.  It is inked, bisected and submitted all in

one.

                                        Jasson

                                        2021 - 1302



Signed________ Bay Sierra MD 2021 1220









Procedures





                Date            Code            Description     Status

 

                2021      16907           Excise Malig Lesion  .6-1CM Face

/Ear/Eyelid/Nose/Lip Completed







Medical Devices





                                        Description

 

                                        No Information Available







Encounters





                                        Description

 

                                        No Information Available







Assessments





                Date            Code            Description     Provider

 

                2021      C44.329         Squamous cell carcinoma of skin 

of other parts of face 

Tera Zayas MD







Plan of Treatment

2021 - Tera Zayas MD* C44.329 Squamous cell carcinoma of skin of 
  other parts of face* Comments:* It was hard to delineate the margins of the 
  scar.  I do not see any leftover of the lesion that was biopsied though there 
  is a closer area of skin redness and thickening that would be involved at the 
  bottom portion of the excision site which may return as some either reactive 
  changes to her even possibly a focus of skin cancer.Patient consented for 
  excision of the site.  The excised portions 8 mm x 8 mm x 2 mm depth and path 
  shows this was positive on the bottom part of the site.He was on the right 
  lateral decubitus position.  Area of the scar prepped with Betadine.  This was
  infiltrated with 1% lidocaine containing epinephrine.  A cruciate skin 
  incision was then created past the margins of the scar and deepened through to
  the superficial subcutaneous tissue and removed.  As mentioned may be portion 
  on the bottom part of the specimen that may have something in them. Discussed 
  postop wound care:     a. keep incision dry x 24 hours.     b. May remove 
  dressings after 24 hrs. May get area wet. pat incision dry. may replace with 
  light cover prn for comfort.. Follow up for suture removal in one week









Functional Status





                                        Description

 

                                        No Information Available







Mental Status





                                        Description

 

                                        No Information Available







Referrals





                Refer to      Reason for Referral Status          Appt Date

 

                Tera Zayas MD SCC, RT CHECK   Scheduled       2021

 

                                        [address] [place] [address] [phone]

## 2021-10-24 NOTE — CCD
Continuity of Care Document (CCD)

                             Created on: 10/13/2021



Preston Ivory

External Reference #: [id number]

: 1935

Sex: Male



Demographics





                          Address                   [address] [place]  [address]

 

                          Home Phone                [phone]

 

                          Preferred Language        Unknown

 

                          Marital Status            Unknown

 

                          Congregation Affiliation     Unknown

 

                          Race                      Unknown

 

                          Ethnic Group              Unknown





Author





                          Author                    Preston KAUFMAN F.N.P.

 

                          Organization              Unknown

 

                          Address                   [address]

[place]  [address]



 

                          Phone                     [phone]







Care Team Providers





                    Care Team Member Name Role                Phone

 

                    Kei Smith MD   [facility]                [phone]







Problems





                                        Description

 

                                        No Information Available







Social History





                Type            Date            Description     Comments

 

                Birth Sex                       Unknown          

 

                Cigarette Use                   currently smokes 1/2 Pack Daily 

 

 

                ETOH Use                        Social Drinker   

 

                Tobacco Use     Start: Unknown  Patient is a current smoker, smo

kes every day  

 

                Sun Exposure                    minimum amount of sun exposure  

 

                Sun Exposure                    Has never used tanning bed  

 

                Sun Exposure                    Has never experienced blistering

 from sunburns  

 

                Sun Exposure                    Uses > 30 SPF    







Allergies and adverse reactions





             Active Allergies Criticality  Reaction | Severity Comments     Date

 

             sulfa        Unable to assess criticality                          

 04/10/2012







Medications





           Active Medications SIG        Qnty       Indications Ordering Provide

r Date

 

                          Fluorouracil                     5% Cream             

      apply to lateral 

forehead, temples and L ear sparingly twice a day 40gm                L57.0     

          SCARLETT CoelloN.P.                                  2021

 

                          Glipizide XL                     2.5mg Tablets ER 24HR

                   1 tab 

by mouth every day                                 Unknown         

 

                          Lipitor                     20mg Tablets              

     1 tab by mouth every 

day .                                           Unknown         

 

                                        Amlodipine Besylate/Benazepril Hydrochlo

ride                     5-20mg Capsules

                  1 tab by mouth every day                           Unknown    

  

 

                          Atenolol                     25mg Tablets             

      2 tabs by mouth 

every day                                       Unknown         

 

                          Tamsulosin HCL                     0.4mg Capsules     

              1 tab by 

mouth every day                                 Unknown         

 

                          Combigan                     0.2-0.5% Solution        

           1 in drop both 

eyes twice a day                                 Unknown         

 

                          Travatan Z                     0.004% Solution        

           1 drop in both 

eyes twice a day                                 Unknown         

 

                          Aspirin                     81mg Tablets DR           

        1 by mouth every 

day                                             Unknown         

 

           Procrit    1 shot as needed every 3-4 weeks                       Unk

nown    

 

                          Cinnamon                     500mg Tablets            

       1 tab by mouth 

every day                                       Unknown         

 

           Vitamin D2                                  Unknown    







Immunizations





                                        Description

 

                                        No Information Available







Vital Signs





                Date            Vital           Result          Comment

 

                2021 12:12pm BP Systolic     151 mmHg         

 

                    BP Diastolic        77 mmHg              

 

                    Heart Rate          61 /min              

 

                    Weight              150.00 lb            

 

                2021  3:38pm BP Systolic     148 mmHg         

 

                    BP Diastolic        78 mmHg              

 

                    Weight              150.00 lb            

 

                    Respiratory Rate    17 /min              







Results





        Test    Acquired Date Facility Test    Result  H/L     Range   Note

 

                    [facility]      2021          [facility]

LC

             Icd9 Code    ICD9 Code: C44.3 <SEE NOTE>                           

 1, 2

 

             PDFReport    SEE IMAGE                               







                          1                         Refer Dr Jameson letter awai

ting signature LW 21 photo emailed, letter 

sent awaiting appt lw 21 wife called inquiring on appt with Dr Jameson, I 
spoke with Devika and Dr Jameson is out for 2 weeks and Mervin is out this week.
 She will call his wife and get him scheduled  LW 21 Patient's wife came 
into the office today and wanted referral resent to Dr Zayas and he is 
scheduled for 21 at 11:10, letter redone awaiting signature LW 21 need 
to cancel  referral with Dr Jameson photo emailed, letter sent to Dr Zayas,  
LW 21

 

                          2                         ICD9 Code: C44.329

Protocol: shave

Clinical Text: SCC

Final Diagnosis: SQUAMOUS CELL CARCINOMA, ACANTHOLYTIC TYPE, EXTENDING TO THE 
BOTTOM OF THE SECTIONS.

Gross Text: The specimen grossly was irregular in shape and measured 8 x 8 mm. 
on the surface and 2 mm. deep. It was divided into 3 sections on the long axis. 
All of the tissue was submitted for processing.

Microscopic Description: The corium is invaded by atypical keratinocytes with 
extensive acantholysis, and the lesion extends to the bottom of the sections.

CPT: 22951*1









Procedures





                Date            Code            Description     Status

 

                2021      70970           Office/Outpatient Established Mo

d MDM 30-39 Min Completed

 

                2021      71056           Office/Outpatient Established Mo

d MDM 30-39 Min Completed

 

                2021      54879           Shave Biopsy Of Skin, Single Les

ion Completed







Medical Devices





                                        Description

 

                                        No Information Available







Encounters





           Type       Date       Location   Provider   Dx         Diagnosis

 

           Office Visit 2021 12:15p Main Office Sadaf HINDS'lino, F.N.P. 

L57.0      

Actinic keratosis

 

           Office Visit 2021  3:45p Main Office Sadaf HINDS'lino, F.N.P. 

D48.5      

Neoplasm of uncertain behavior of skin

 

                          L57.0                     Actinic keratosis

 

                          C44.320                   Squamous cell carcinoma of s

kin of unspecified parts of face







Assessments





                Date            Code            Description     Provider

 

                2021      L57.0           Actinic keratosis Sadaf HINDS'br

ien, F.N.P.

 

                2021      D48.5           Neoplasm of uncertain behavior o

f skin Sadaf HINDS'lino, 

F.N.P.

 

                2021      L57.0           Actinic keratosis Sadaf HINDS'andrew adamsn, F.N.P.

 

                2021      C44.320         Squamous cell carcinoma of skin 

of unspecified parts of face 

Sadaf HINDS'lino, F.N.P.







Plan of Treatment

Future Appointment(s):* 2021 11:45 am - Sadaf HINDS'lino, F.N.P. at Main 
  Office

* 10/27/2021 11:30 am - Sadaf HINDS'lino, F.N.P. at Main Office

* 10/20/2021 11:30 am - SWATI Simmons at Main Office

* 2022 12:30 pm - Sadaf HINDS'lino, F.N.P. at Main Office

2021 - Sadaf O'lino, F.N.P.* L57.0 Actinic keratosis* New Medication:
  * Fluorouracil 5 % - apply to lateral forehead, temples and L ear sparingly 
  twice a day



* Comments:* Discussed actinic keratoses are precancerous proliferations that 
  occur within sun damaged skin. If untreated, a small subset of AK's can 
  develop into SCC's.   Discussed treatment options including Efudex, Picato, 
  LN2, Imiquimod and Amulez with red light.Discussed to expect redness, scaling,
  crusting, swelling and/or blistering with the treatment of Efudex.Discussed 
  side effects include infection at site of application, headache and flu-like 
  symptoms. Will start Efudex on , BID to above areas.  Instructed to 
  wash hands with soap and water after applying Efudex and to avoid touching 
  eyes, genitals or spouses genitals before washing.Instructed to not cover 
  areas treated.Can use Aquaphor after about 1 hour PRN for comfort.Instructed 
  to call with any problems.



* Follow up:* 2 weeks from  - Efudex follow up 3 weeks from  -
  Efudex follow up









Functional Status





                                        Description

 

                                        No Information Available







Mental Status





                                        Description

 

                                        No Information Available







Referrals





                                        Description

 

                                        No Information Available

## 2021-10-24 NOTE — CCD
Summarization Of Episode

                             Created on: 10/24/2021



LUMA IVORY

External Reference #: [id number]

: 1935

Sex: Undifferentiated



Demographics





                          Address                   [address] [place] [address]

 

                          Home Phone                [phone]

 

                          Preferred Language        English

 

                          Marital Status            Unknown

 

                          Restoration Affiliation     Unknown

 

                          Race                      Unknown

 

                          Ethnic Group              Not  or 





Author





                          Author                    [facility]

 

                          Organization              [facility]

 

                          Address                   Unknown

 

                          Phone                     Unavailable







Support





                Name            Relationship    Address         Phone

 

                TORRI IVORY Next Of Kin     Unknown         Unavailable

 

                MCKENNA IVORY   Next Of Kin     Unknown         [phone]

 

                    TORRI IVORY     Next Of Kin         [address] 

[place] [address]                    [phone]

 

                    SELF EMPLOYED       Next Of Kin         [address] [place] [address] 

[place] [address]                    [phone]

 

                RE              Next Of Kin     Unknown         Unavailable

 

                    ADRIAN IVORY    Next Of Kin         [address] [place] [address]                    [phone]

 

                    ADRIAN Ivorylian    ECON                [address] [place] [address]                    Unavailable







Care Team Providers





                    Care Team Member Name Role                Phone

 

                    MUNIRA Varela MD Unavailable         Unavailable

 

                    MUNIRA Varela MD Unavailable         Unavailable

 

                    MUNIRA Varela MD Unavailable         Unavailable

 

                    MUNIRA Varela MD Unavailable         Unavailable

 

                    MUNIRA Varela MD Unavailable         Unavailable

 

                    MUNIRA Varela MD Unavailable         Unavailable

 

                    MUNIRA Varela MD Unavailable         Unavailable

 

                    MUNIRA Varela MD Unavailable         Unavailable

 

                    MUNIRA Varela MD Unavailable         Unavailable

 

                    MUNIRA Varela MD Unavailable         Unavailable

 

                    MUNIRA Varela MD Unavailable         Unavailable

 

                    JUAN Wheatley MD Unavailable         Unavailable

 

                    JUAN Wheatley MD Unavailable         Unavailable

 

                    JUAN Wheatley MD Unavailable         Unavailable

 

                    JUAN Wheatley MD Unavailable         Unavailable

 

                    JUAN Wheatley MD Unavailable         Unavailable

 

                    JUAN Wheatley MD Unavailable         Unavailable

 

                    JUAN Wheatley MD Unavailable         Unavailable

 

                    JUAN Wheatley MD Unavailable         Unavailable

 

                    JUAN Wheatley MD Unavailable         Unavailable

 

                    JUAN Wheatley MD Unavailable         Unavailable

 

                    JUAN Wheatley MD Unavailable         Unavailable

 

                    JUAN Wheatley MD Unavailable         Unavailable

 

                    JUAN Wheatley MD Unavailable         Unavailable

 

                    JUAN Wheatley MD Unavailable         Unavailable

 

                    JUAN Wheatley MD Unavailable         Unavailable

 

                    JUAN Wheatley MD Unavailable         Unavailable

 

                    JUAN Wheatley MD Unavailable         Unavailable

 

                    BolJUAN rodríguez MD Unavailable         Unavailable

 

                    BolJUAN rodríguez MD Unavailable         Unavailable

 

                    BolJUAN rodríguez MD Unavailable         Unavailable

 

                    BollaJUAN MD Unavailable         Unavailable

 

                    BolJUAN rodríguez MD Unavailable         Unavailable

 

                    BolJUAN rodríguez MD Unavailable         Unavailable

 

                    Bolla, JUAN Ramos MD Unavailable         Unavailable

 

                    BolJUAN rodríguez MD Unavailable         Unavailable

 

                    Bolla, JUAN Ramos MD Unavailable         Unavailable

 

                    BolJUAN rodríguez MD Unavailable         Unavailable

 

                    Bolla, JUAN Ramos MD Unavailable         Unavailable

 

                    BolJUAN rodríguez MD Unavailable         Unavailable

 

                    BolJUAN rodríguez MD Unavailable         Unavailable

 

                    BolJUAN rodríguez MD Unavailable         Unavailable

 

                    Bolla, JUAN Ramos MD Unavailable         Unavailable

 

                    Bolla, JUAN Ramos MD Unavailable         Unavailable

 

                    Bolla, JUAN Ramos MD Unavailable         Unavailable

 

                    Bolla, JUAN Ramos MD Unavailable         Unavailable

 

                    Bolla, JUAN Ramos MD Unavailable         Unavailable

 

                    BolJUAN rodríguez MD Unavailable         Unavailable

 

                    Bolla, JUAN Ramos MD Unavailable         Unavailable

 

                    BolJUAN rodríguez MD Unavailable         Unavailable

 

                    BolJUAN rodríguez MD Unavailable         Unavailable

 

                    BolJUAN rodríguez MD Unavailable         Unavailable

 

                    BolJUAN rodríguez MD Unavailable         Unavailable

 

                    BolJUAN rodríguez MD Unavailable         Unavailable

 

                    BolJUAN rodríguez MD Unavailable         Unavailable

 

                    BolJUAN rodríguez MD Unavailable         Unavailable

 

                    BolJUAN rodríguez MD Unavailable         Unavailable

 

                    BolJUAN rodríguez MD Unavailable         Unavailable

 

                    BolJUAN rodríguez MD Unavailable         Unavailable

 

                    BolJUAN rodríguez MD Unavailable         Unavailable

 

                    Lincoln, Brea FNP Unavailable         Unavailable

 

                    Lincoln, Brea FNP Unavailable         Unavailable

 

                    Lincoln, Brea FNP Unavailable         Unavailable

 

                    Lincoln, Brea FNP Unavailable         Unavailable

 

                    Lincoln, Brea FNP Unavailable         Unavailable

 

                    Lincoln, Brea FNP Unavailable         Unavailable

 

                    Lincoln, Brea FNP Unavailable         Unavailable

 

                    Lincoln, Brea FNP Unavailable         Unavailable

 

                    Lincoln, Brea FNP Unavailable         Unavailable

 

                    Lincoln, Brea FNP Unavailable         Unavailable

 

                    Lincoln, Brea FNP Unavailable         Unavailable

 

                    Lincoln, Brea FNP Unavailable         Unavailable

 

                    Lincoln, Brea FNP Unavailable         Unavailable

 

                    Lincoln, Brea FNP Unavailable         Unavailable

 

                    Lincoln, Brea FNP Unavailable         Unavailable

 

                    Lincoln, Brea FNP Unavailable         Unavailable

 

                    Lincoln, Brea FNP Unavailable         Unavailable

 

                    Lincoln, Brea FNP Unavailable         Unavailable

 

                    Lincoln, Brea FNP Unavailable         Unavailable

 

                    Lincoln, Brea FNP Unavailable         Unavailable

 

                    Lincoln, Brea FNP Unavailable         Unavailable

 

                    Lincoln, Brea FNP Unavailable         Unavailable

 

                    Lincoln, Brea FNP Unavailable         Unavailable

 

                    Lincoln, Brea FNP Unavailable         Unavailable

 

                    Lincoln, Brea FNP Unavailable         Unavailable

 

                    Lincoln, Brea FNP Unavailable         Unavailable

 

                    Lincoln, Brea FNP Unavailable         Unavailable

 

                    Lincoln, Brea FNP Unavailable         Unavailable

 

                    Lincoln, Brea FNP Unavailable         Unavailable

 

                    Lincoln, Brea FNP Unavailable         Unavailable

 

                    Lincoln, Brea FNP Unavailable         Unavailable

 

                    Lincoln, Brea FNP Unavailable         Unavailable

 

                    Lincoln, Brea FNP Unavailable         Unavailable

 

                    Lincoln, Brea FNP Unavailable         Unavailable

 

                    Lincoln, Brea FNP Unavailable         Unavailable

 

                    Lincoln, Brea FNP Unavailable         Unavailable

 

                    Jumalon, M Page FNP Unavailable         Unavailable

 

                    Jumalon, M Page FNP Unavailable         Unavailable

 

                    Jumalon, M Page FNP Unavailable         Unavailable

 

                    Jumalon, M Page FNP Unavailable         Unavailable

 

                    Jumalon, M Page FNP Unavailable         Unavailable

 

                    Jumalon, M Page FNP Unavailable         Unavailable

 

                    Jumalon, M Page FNP Unavailable         Unavailable

 

                    Jumalon, M Page FNP Unavailable         Unavailable

 

                    Jumalon, M Page FNP Unavailable         Unavailable

 

                    Jumalon, M Page FNP Unavailable         Unavailable

 

                    Jumalon, M Page FNP Unavailable         Unavailable

 

                    Jumalon, M Page FNP Unavailable         Unavailable

 

                    Jumalon, M Page FNP Unavailable         Unavailable

 

                    Jumalon, M Page FNP Unavailable         Unavailable

 

                    Jumalon, M Page FNP Unavailable         Unavailable

 

                    Jumalon, M Page FNP Unavailable         Unavailable

 

                    Jumalon, M Page FNP Unavailable         Unavailable

 

                    Jumalon, M Page FNP Unavailable         Unavailable

 

                    Jumalon, M Page FNP Unavailable         Unavailable

 

                    Jumalon, M Page FNP Unavailable         Unavailable

 

                    Jumalon, M Page FNP Unavailable         Unavailable

 

                    Jumalon, M Page FNP Unavailable         Unavailable

 

                    Jumalon, M Page FNP Unavailable         Unavailable

 

                    Jumalon, M Page FNP Unavailable         Unavailable

 

                    Jumalon, M Page FNP Unavailable         Unavailable

 

                    Jumalon, M Page FNP Unavailable         Unavailable

 

                    Jumalon, M Page FNP Unavailable         Unavailable

 

                    Jumalon, M Page FNP Unavailable         Unavailable

 

                    Jumalon, M Page FNP Unavailable         Unavailable

 

                    Jumalon, M Page FNP Unavailable         Unavailable

 

                    Brooks Munguia, MELODY Alonso MD, FACS Unavailable         Unavailable

 

                    Brooks Munguia, MELODY Alonso MD, FACS Unavailable         Unavailable

 

                    Brooks Munguia, MELODY Alonso MD, FACS Unavailable         Unavailable

 

                    Brooks Munguia, MELODY Alonso MD, FACS Unavailable         Unavailable

 

                    Brooks Munguia, MELODY Alonso MD, FACS Unavailable         Unavailable

 

                    Brooks Munguia, MELODY Alonso MD, FACS Unavailable         Unavailable

 

                    Brooks Munguia, MELODY Alonso MD, FACS Unavailable         Unavailable

 

                    Brooks Munguia, MELODY Alonso MD, FACS Unavailable         Unavailable

 

                    Brooks Munguia, MELODY Alonso MD, FACS Unavailable         Unavailable

 

                    Brooks Munguia, MELODY Alonso MD, FACS Unavailable         Unavailable

 

                    Brooks Munguia, MELODY Alonso MD, FACS Unavailable         Unavailable

 

                    Brooks Munguia, MELODY Alonso MD, FACS Unavailable         Unavailable

 

                    Brooks Munguia, MELODY Alonso MD, FACS Unavailable         Unavailable

 

                    Brooks Munguia, MELODY Alonso MD, FACS Unavailable         Unavailable

 

                    Brooks Munguia, MELODY Alonso MD, FACS Unavailable         Unavailable

 

                    Brooks Munguia, MELODY Alonso MD, FACS Unavailable         Unavailable

 

                    Brooks Munguia, MELODY Alonso MD, FACS Unavailable         Unavailable

 

                    Brooks Munguia, MELODY Alonso MD, FACS Unavailable         Unavailable

 

                    Brooks Munguia, MELODY Alonso MD, FACS Unavailable         Unavailable

 

                    Brooks Munguia, MELODY Alonso MD, FACS Unavailable         Unavailable

 

                    Brooks Munguia, MELODY Alonso MD, FACS Unavailable         Unavailable

 

                    Brooks Munguia, MELODY Alonso MD, FACS Unavailable         Unavailable

 

                    Rbooks Munguia, MELODY Alonso MD, FACS Unavailable         Unavailable

 

                    Brooks Munguia, MELODY Alonso MD, FACS Unavailable         Unavailable

 

                    Brooks Munguia, MELODY Alonso MD, FACS Unavailable         Unavailable

 

                    Brooks Munguia, MELODY Alonso MD, FACS Unavailable         Unavailable

 

                    Brooks Munguia, MELODY Alonso MD, FACS Unavailable         Unavailable

 

                    Brooks Munguia, MELODY Alonso MD, FACS Unavailable         Unavailable

 

                    Brooks Munguia, MELODY Alonso MD, FACS Unavailable         Unavailable

 

                    Brooks Munguia, MELODY Alonso MD, FACS Unavailable         Unavailable

 

                    Brooks Munguia, MELODY Alonso MD, FACS Unavailable         Unavailable

 

                    Brooks Munguia, MELODY Alonso MD, FACS Unavailable         Unavailable

 

                    Brooks Munguia, MELODY Alonso MD, FACS Unavailable         Unavailable

 

                    Brooks Munguia, MELODY Alonso MD, FACS Unavailable         Unavailable

 

                    Brooks Munguia, MELODY Alonso MD, FACS Unavailable         Unavailable

 

                    Brooks Munguia, MELODY Alonso MD, FACS Unavailable         Unavailable

 

                    Cecilia Munguia, MELODY Alonso MD, FACS Unavailable         Unavailable

 

                    Cecilia Munguia, MELODY Alonso MD, FACS Unavailable         Unavailable

 

                    Cecilia Munguia, MELODY Alonso MD, FACS Unavailable         Unavailable

 

                    Altamirano, L Maryuri RPA Unavailable         Unavailable

 

                    Altamirano, L Maryuri RPA Unavailable         Unavailable

 

                    Altamirano, L Maryuri RPA Unavailable         Unavailable

 

                    Altamirano, L Maryuri RPA Unavailable         Unavailable

 

                    Altamirano, L Maryuri RPA Unavailable         Unavailable

 

                    Altamirano, L Maryuri RPA Unavailable         Unavailable

 

                    Altamirano, L Maryuri RPA Unavailable         Unavailable

 

                    Altamirano, L Maryuri RPA Unavailable         Unavailable

 

                    Altamirano, L Maryuri RPA Unavailable         Unavailable

 

                    Altamirano, L Maryuri RPA Unavailable         Unavailable

 

                    Altamirano, L Maryuri RPA Unavailable         Unavailable

 

                    Altamirano, L Maryuri RPA Unavailable         Unavailable

 

                    Altamriano, L Maryuri RPA Unavailable         Unavailable

 

                    Altamirano, L Maryuri RPA Unavailable         Unavailable

 

                    Altamirano, L Maryuri RPA Unavailable         Unavailable

 

                    Altamirano, L Maryuri RPA Unavailable         Unavailable

 

                    Altamirano, L Maryuri RPA Unavailable         Unavailable

 

                    Altamirano, L Maryuri RPA Unavailable         Unavailable

 

                    Altamirano, L Maryuri RPA Unavailable         Unavailable

 

                    Altamirano, L Maryuri RPA Unavailable         Unavailable

 

                    Altamirano, L Maryuri RPA Unavailable         Unavailable

 

                    Altamirano, L Maryuri RPA Unavailable         Unavailable

 

                    Altamirano, L Maryuri RPA Unavailable         Unavailable

 

                    Altamirano, L Maryuri RPA Unavailable         Unavailable

 

                    Altamirano, L Maryuri RPA Unavailable         Unavailable

 

                    Altamirano, L Maryuri RPA Unavailable         Unavailable

 

                    Altamirano, L Maryuri RPA Unavailable         Unavailable

 

                    Altamirano, L Maryuri RPA Unavailable         Unavailable

 

                    Altamirano, L Maryuri RPA Unavailable         Unavailable

 

                    Altamirano, L Maryuri RPA Unavailable         Unavailable

 

                    Altamirano, L Maryuri RPA Unavailable         Unavailable

 

                    Altamirano, L Maryuri RPA Unavailable         Unavailable

 

                    Tyler, Mi Young PT    Unavailable         Unavailable

 

                    Tyler, Mi Young PT    Unavailable         Unavailable

 

                    Tyler, Mi Young PT    Unavailable         Unavailable

 

                    Tyler, Mi Young PT    Unavailable         Unavailable

 

                    Tyler, Mi Young PT    Unavailable         Unavailable

 

                    Tyler, Mi Young PT    Unavailable         Unavailable

 

                    Tyler, Mi Young PT    Unavailable         Unavailable

 

                    Tyler, Mi Young PT    Unavailable         Unavailable

 

                    Tyler, Mi Young PT    Unavailable         Unavailable

 

                    Tyler, Mi Young PT    Unavailable         Unavailable

 

                    Tyler, Mi Young PT    Unavailable         Unavailable

 

                    Tyler, Mi Young PT    Unavailable         Unavailable

 

                    Tyler, Mi Young PT    Unavailable         Unavailable

 

                    Tyler, Mi Young PT    Unavailable         Unavailable

 

                    Tyler, Mi Young PT    Unavailable         Unavailable

 

                    Tyler, Mi Young PT    Unavailable         Unavailable

 

                    Tyler, Mi Young PT    Unavailable         Unavailable

 

                    Tyler, Mi Young PT    Unavailable         Unavailable

 

                    Tyler, Mi Young PT    Unavailable         Unavailable

 

                    Tyler, Mi Young PT    Unavailable         Unavailable

 

                    Tyler, Mi Young PT    Unavailable         Unavailable

 

                    Tyler, Mi Young PT    Unavailable         Unavailable

 

                    Tyler, Mi Young PT    Unavailable         Unavailable

 

                    Lyndsay English PA    Unavailable         Unavailable

 

                    Amador,  Lyndsay PA    Unavailable         Unavailable

 

                    Amador,  Lyndsay PA    Unavailable         Unavailable

 

                    Amador,  Lyndsay PA    Unavailable         Unavailable

 

                    Amador,  Lyndsay PA    Unavailable         Unavailable

 

                    Amador,  Lyndsay PA    Unavailable         Unavailable

 

                    Amador,  Lyndsay PA    Unavailable         Unavailable

 

                    Amador,  Lyndsay PA    Unavailable         Unavailable

 

                    Amador,  Lyndsay PA    Unavailable         Unavailable

 

                    Amador,  Lyndsay PA    Unavailable         Unavailable

 

                    Amador,  Lyndsay PA    Unavailable         Unavailable

 

                    Amador,  Lyndsay PA    Unavailable         Unavailable

 

                    Amador,  Lyndsay PA    Unavailable         Unavailable

 

                    Amador,  Lyndsay PA    Unavailable         Unavailable

 

                    Amador,  Lyndsay PA    Unavailable         Unavailable

 

                    Amador,  Lyndsay PA    Unavailable         Unavailable

 

                    Amador,  Lyndsay PA    Unavailable         Unavailable

 

                    Amador,  Lyndsay PA    Unavailable         Unavailable

 

                    Amador,  Lyndsay PA    Unavailable         Unavailable

 

                    Amador,  Lyndsay PA    Unavailable         Unavailable

 

                    Amador,  Lyndsay PA    Unavailable         Unavailable

 

                    Amador,  Lyndsay PA    Unavailable         Unavailable

 

                    Amador,  Lyndsay PA    Unavailable         Unavailable

 

                    Amador,  Lyndsay PA    Unavailable         Unavailable

 

                    Amador,  Lyndsay PA    Unavailable         Unavailable

 

                    Amador,  Lyndsay PA    Unavailable         Unavailable

 

                    Amador,  Lyndsay PA    Unavailable         Unavailable

 

                    Amador,  Lyndsay PA    Unavailable         Unavailable

 

                    Amador,  Lyndsay PA    Unavailable         Unavailable

 

                    Amador,  Lyndsay PA    Unavailable         Unavailable

 

                    Amador,  Lyndsay PA    Unavailable         Unavailable

 

                    Amador,  Lyndsay PA    Unavailable         Unavailable

 

                    Amador,  Lyndsay PA    Unavailable         Unavailable

 

                    Amador,  Lyndsay PA    Unavailable         Unavailable

 

                    Amador,  Lyndsay PA    Unavailable         Unavailable

 

                    Amador,  Lyndsay PA    Unavailable         Unavailable

 

                    Amador,  Lyndsay PA    Unavailable         Unavailable

 

                    Amador,  Lyndsay PA    Unavailable         Unavailable

 

                    Amador,  Lyndsay PA    Unavailable         Unavailable

 

                    Amador,  Lyndsay PA    Unavailable         Unavailable

 

                    Amador,  Lyndsay PA    Unavailable         Unavailable

 

                    Amador,  Lyndsay PA    Unavailable         Unavailable

 

                    Amador,  Lyndsay PA    Unavailable         Unavailable

 

                    Amador,  Lyndsay PA    Unavailable         Unavailable

 

                    Amador,  Lyndsay PA    Unavailable         Unavailable

 

                    Amador,  Lyndsay PA    Unavailable         Unavailable

 

                    Amador,  Lyndsay PA    Unavailable         Unavailable

 

                    Amador,  Lyndsay PA    Unavailable         Unavailable

 

                    Amador,  Lyndsay PA    Unavailable         Unavailable



                                  



Re-disclosure Warning

          The records that you are about to access may contain information from 
federally-assisted alcohol or drug abuse programs. If such information is 
present, then the following federally mandated warning applies: This information
has been disclosed to you from records protected by federal confidentiality 
rules (42 CFR part 2). The federal rules prohibit you from making any further 
disclosure of this information unless further disclosure is expressly permitted 
by the written consent of the person to whom it pertains or as otherwise 
permitted by 42 CFR part 2. A general authorization for the release of medical 
or other information is NOT sufficient for this purpose. The Federal rules 
restrict any use of the information to criminally investigate or prosecute any 
alcohol or drug abuse patient.The records that you are about to access may 
contain highly sensitive health information, the redisclosure of which is 
protected by Article 27-F of the [facility] Public Health law. If you 
continue you may have access to information: Regarding HIV / AIDS; Provided by 
facilities licensed or operated by the [facility] Office of Mental Health; 
or Provided by the [facility] Office for People With Developmental 
Disabilities. If such information is present, then the following [place] 
mandated warning applies: This information has been disclosed to you from 
confidential records which are protected by state law. State law prohibits you 
from making any further disclosure of this information without the specific 
written consent of the person to whom it pertains, or as otherwise permitted by 
law. Any unauthorized further disclosure in violation of state law may result in
a fine or [facility] sentence or both. A general authorization for the release of 
medical or other information is NOT sufficient authorization for further disc
losure.                                                                         
    



Allergies and Adverse Reactions

          



           Type       Description Substance  Reaction   Status     Data Source(s

)

 

           Allergy to substance No Known Allergies No known allergies (situation

)                       

[place] (Gavin Munguia MD [facility])



                                                                                
       



Family History

          



             Family Member Name Family Member Gender Family Member Status Date o

f Status 

Description                             Data Source(s)

 

           Unknown    Male       Problem                          [facility] ([facility])

 

                                        () 

 

           Unknown    Female     Problem                          [facility] ([facility])

 

           Unknown    Female     Problem                          [facility] ([facility] )



                                                                                
                 



Encounters

          



           Encounter  Providers  Location   Date       Indications Data Source(s

)

 

                Outpatient      Attender: Sadaf Bailey [facility] Main Office     

2021 12:15:00 PM 

EDT                                                 [facility] ([facility]

 

                Office Visit    Attender: Maryuri Arellano/Flor catherine 2021 

01:30:00 PM EDT                                     [facility] ([facility]

[facility])

 

                Outpatient      Attender: Sadaf Bailey [facility] Main Office     

2021 03:45:00 PM 

EDT                                                 [facility] ([facility]

 

                                        Outpatient<td ID="encounterTypeDescripti

onID0">2 Year Follow-Up</td><td>Gavin Walker MD, [facility]</td><td>Gavin Walker MD 
[facility]</td><td>2021</td><td>11:49AM</td><td>12:36PM</td><td><content 
ID="encounterDiagnosisID0-0">Cataract Senile Posterior Subcapsular 
Polar</content>, <content ID="encounterDiagnosisID0-1">Cataract Senile 
Cortical</content>, <content ID="encounterDiagnosisID0-2">Cataract Senile 
Nuclear</content>, <content ID="encounterDiagnosisID0-3">Taking Medication For 
Diabetes Long-term Use of Oral Hypoglycemics</content>, <content 
ID="encounterDiagnosisID0-4">Glaucoma Open-angle Primary Both Eyes</content>, 
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 

[facility]                      Gavin Walker MD [facility]  2021 11:49:00 AM EDT -

 2021 12:36:00 

PM EDT                                  Taking Medication For Diabetes Long-term

 Use of Oral 

HypoglycemicsCataract Senile Posterior Subcapsular PolarType 2 Diab W/ Diab 
Retinopathy Mild Nonprolif Without Macular EdemaCataract Senile CorticalGlaucoma
Open-angle Primary Both EyesCataract Senile Nuclear [facility] (Gavin Munguia MD [facility])

 

                                        Taking Medication For Diabetes Long-term

 Use of Oral Hypoglycemics 

 

                                        Cataract Senile Posterior Subcapsular Po

lar 

 

                                        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

prolif Without Macular Edema 

 

                                        Cataract Senile Cortical 

 

                                        Glaucoma Open-angle Primary Both Eyes 

 

                                        Cataract Senile Nuclear 

 

           Outpatient Attender: Lyndsay VALENCIA Main Office 2021 11:00:00 A

M EDT            

[facility] ([facility])

 

                Outpatient      Attender: Petr Varela MD Physical Therapy  10:15:00 AM 

EDT                                                 [facility] ([facility] )

 

             Outpatient   Attender: Colin Scott PT              2021 05:05:0

0 PM EDT FULL INCONTINENCE

OF FECES DIARRHEA FECAL SMEARING        [facility]

 

                                        FULL INCONTINENCE OF FECES DIARRHEA FECA

L SMEARING 

 

           Outpatient Attender: Colin Scott PT            2021 05:05:00 PM E

[facility], NP: [id number], [place] [address], Ph. [phone] Attender: Page MARIE [place] [facility] 10/21/2020 12:00:00 AM EDT                     ATHE

[place] ([facility])

 

                                        Richard Wheatley MD: [id number], [place] [address]

[address], Ph. [phone]  Attender: [place] Wheat[place] MD [place] [facility] 10/05/2020 12:00:00 AM EDT                     SAIDA 

([facility])

 

                                        Richard Wheatley MD: [address], [place] [address]

[address], Ph. [phone]  Attender: [place] [facility] 10/05/2020 12:00:00 AM EDT                     SAIDA 

([facility])

 

                                        Richard Wheatley MD: [address], [place] [address]

[address], Ph. [phone]      Attender: [place] Wheatley MD [place] [facility] 2020 12:00:00 AM EDT                     SAIDA 

([facility])

 

                                        Richard Wheatley MD: [address], [place] [address]

[address], Ph. [phone]      Attender: [place] MD [place] [facility] 2020 12:00:00 AM EDT                     SAIDA 

([facility])

 

                                        Richard Wheatley MD: [address], [place] [address]

[address], Ph. [phone]      Attender: Richard Wheatley MD [place] [facility] 2020 12:00:00 AM EDT                     SAIDA 

([facility])

 

                                        Richard Wheatley MD: [address], [place] [address], Ph. [phone]  Attender: Richard [place] MD [place] [facility] 2020 12:00:00 AM EDT                     SAIDA 

([facility])

 

                                        Richard Wheatley MD: [address], [place] [address]

[address], Ph. [phone]  Attender: Richard SAHA - [facility] 2020 12:00:00 AM EDT                     SAIDA 

([facility])

 

                                        Richard Wheatley MD: [address], [place] [address]

[address], Ph. [phone]  Attender: Richard SAHA - [facility] 2020 12:00:00 AM EDT                     SAIDA 

([facility])

 

                                        Richard Wheatley MD: [address], [place] [address]

[address], Ph. [phone]  Attender: Richard SAHA - [facility] 2020 12:00:00 AM EDT                     SAIDA 

([facility])

 

                                        Richard Wheatley MD: [address], [place] [address], Ph. [phone]      Attender: Richard SAHA - [facility] 2020 12:00:00 AM EDT                     SAIDA 

([facility])

 

                                        Richard Wheatley MD: [address], [place] [address]

[address], Ph. [phone]      Attender: Richard SAHA - [facility] 2020 12:00:00 AM EDT                     SAIDA 

([facility])

 

                                        Richard Wheatley MD: [address], [place] [address]

[address], Ph. [phone]      Attender: Richard SAHA - [facility] 2020 12:00:00 AM EDT                     SAIDA 

([facility])

 

                                        Richard Wheatley MD: [address], [place] [address]

[address], Ph. [phone]      Attender: [place] Wheat[place] MD [place] [facility] 2020 12:00:00 AM EDT                     SAIDA 

([facility])

 

                                        Richard Wheatley MD: [address], [place] [address]

[address], Ph. [phone]      Attender: Richard SAHA - [facility] 2020 12:00:00 AM EDT                     SAIDA 

([facility])

 

                                        Richard Wheatley MD: [address], [place] [address]

[address], Ph. [phone]  Attender: Richard SAHA - [facility] 2020 12:00:00 AM EDT                     SAIDA 

([facility])

 

                                        Richard Wheatley MD: [address], [place] [address]

[address], Ph. [phone]  Attender: Richard SAHA - [facility] 2020 12:00:00 AM EDT                     SAIDA 

([facility])

 

                                        Richard Wheatley MD: [address], [place] [address]

[address], Ph. [phone]  Attender: Richard SAHA - [facility] 2020 12:00:00 AM EDT                     SAIDA 

([facility])

 

                                        Richard Wheatley MD: [address], [place] [address]

[address], Ph. [phone]  Attender: Richard SAHA - [facility] 2020 12:00:00 AM EDT                     SAIDA 

([facility])

 

                                        Richard Wheatley MD: [address], [place] [address]

[address], Ph. [phone]  Attender: [place] Wheatley MD [place] [facility] 2020 12:00:00 AM EDT                     SAIDA 

([facility])

 

                                        Richard Wheatley MD: [address], [place] [address]

[address], Ph. [phone]  Attender: [place] MD [place] [facility] 2020 12:00:00 AM EDT                     SAIDA 

([facility])

 

                                        Richard Wheatley MD: [address], [place] [address]

[address], Ph. [phone]      Attender: Richard SAHA - [facility] 2020 12:00:00 AM EDT                     SAIDA 

([facility])

 

                                        Richard Wheatley MD: [address], [place] [address]

[address], Ph. [phone]      Attender: [place] [facility] 2020 12:00:00 AM EDT                     SAIDA 

([facility])

 

                                        Richard Wheatley MD: [address], [place] [address]

[address], Ph. [phone]      Attender: [place] [facility] 2020 12:00:00 AM EDT                     SAIDA 

([facility])

 

                                        Richard Wheatley MD: [address], [place] [address]

[address], Ph. [phone]      Attender: Richard SAHA - [facility] 2020 12:00:00 AM EDT                     SAIDA 

([facility])

 

                                        Richard Wheatley MD: [address], [place] [address]

[address], Ph. [phone]      Attender: [place] [facility] 2020 12:00:00 AM EDT                     SAIDA 

([facility])

 

                                        Richard Wheatley MD: [address], [place] [address]

[address], Ph. [phone]      Attender: [place] [facility] 2020 12:00:00 AM EDT                     SAIDA 

([facility])

 

                                        Richard Wheatley MD: [address], [place] [address]

[address], Ph. [phone]      Attender: [place] MD [place] [facility] 2020 12:00:00 AM EDT                     SAIDA 

([facility])

 

                                        Richard Wheatley MD: [address], [place] [address]

[address], Ph. [phone]  Attender: [place] Wheatley MD [place] [facility] 2020 12:00:00 AM EDT                     SAIDA 

([facility])

 

                                        Richard Wheatley MD: [address], [place] [address]

[address], Ph. [phone]  Attender: [place]ley MD [place] [facility] 2020 12:00:00 AM EDT                     SAIDA 

([facility])

 

                                        Richard Wheatley MD: [address], [place] [address]

[address], Ph. [phone]  Attender: [place] MD [place] [facility] 2020 12:00:00 AM EDT                     SAIDA 

([facility])

 

                                        Richard Wheatley MD: [address], [place] [address]

[address], Ph. [phone]  Attender: Richard Wheatley MD [place] [facility] 2020 12:00:00 AM EDT                     SAIDA 

([facility])

 

                                        Richard Wheatley MD: [address], [place] [address]

[address], Ph. [phone]  Attender: [place] Wheat[place] MD [place] [facility] 2020 12:00:00 AM EDT                     SAIDA 

([facility])

 

                                        Richard Wheatley MD: [address], [place] [address]

[address], Ph. [phone]  Attender: Richard Wheatley MD [place] [facility] 2020 12:00:00 AM EDT                     SAIDA 

([facility])

 

                                        Richard Wheatley MD: [address], [place] [address]

[address], Ph. [phone]  Attender: Richard Wheatley MD [place] [facility] 2020 12:00:00 AM EDT                     SAIDA 

([facility])

 

                                        Richard Wheatley MD: [address], [place] [address]

[address], Ph. [phone]  Attender: Richard Wheatley MD [place] [facility] 2020 12:00:00 AM EDT                     SAIDA 

([facility])



                                                                                
                                                                                
                                                                                
                                                                                
                                                                                
                                                                                
                                



Immunizations

          



             Vaccine      Date         Status       Description  Data Source(s)

 

             COVID-19 VACC, MRNA(PFIZER)/PF 10/20/2021 12:00:00 AM EDT completed

                 [facility]

 

             COVID-19 VACCINE Pfizer 2021 12:00:00 AM EST completed       

          [facility]

 

                                        Vaccine Series Complete: YESThis Data wa

s Submitted to [facility] Via [facility]. 

 

             COVID-19 VACCINE Pfizer 2021 12:00:00 AM EST completed       

          [facility]

 

                                        Vaccine Series Complete: NOThis Data was

 Submitted to [facility]. 

 

                          INFLUENZA VIRUS VACCINE QUADRIVAL SPLIT -(65 YR 

UP)/PF 10/13/2020 12:00:00

AM EDT              completed                               [facility]



                                                                                
                                     



Medications

          



          Medication Brand Name Start Date Product Form Dose      Route     Admi

nistrative 

Instructions Pharmacy Instructions Status     Indications Reaction   Description

 Data 

Source(s)

 

                          Amlodipine 5 MG / Benazepril hydrochloride 10 MG Oral 

Capsule 5-10 mg AMLODIPINE

BESYLATE/BENAZEPRIL 10/19/2021 12:00:00 AM EDT capsule      90                  

      TAKE ONE CAPSULE BY 

MOUTH EVERY DAY TAKE ONE CAPSULE BY MOUTH EVERY DAY SOLD: 10/20/2021            

                      

[facility]

 

        240 mcg/0.7 mL         10/07/2021 12:00:00 AM EDT syringe 0             

  INJECT AS DIRECTED 

INJECT AS DIRECTED SOLD: 10/07/2021                                        [facility]

 

          2.5 mg              10/04/2021 12:00:00 AM EDT tablet extended release

 24hr 90                  TAKE ONE 

TABLET BY MOUTH EVERY DAY TAKE ONE TABLET BY MOUTH EVERY DAY SOLD: 10/05/2021   

              

                                                    [facility]

 

          5 %                 2021 12:00:00 AM EDT cream     40           

       APPLY TOPICALLY TO LATERAL FOREHEAD,

TEMPLES AND LEFT EAR SPARINGLY TWO TIMES A DAY APPLY TOPICALLY TO LATERAL 

FOREHEAD, TEMPLES AND LEFT EAR SPARINGLY TWO TIMES A DAY SOLD: 10/05/2021       

                                 

[facility]

 

           Fluorouracil 50 MG/ML Topical Cream Fluorouracil 2021 12:00:00 

AM EDT                       

                              active                                  [facility] ([facility])

 

          0.4 mg              2021 12:00:00 AM EDT capsule   90           

       TAKE ONE CAPSULE BY MOUTH EVERY

DAY        TAKE ONE CAPSULE BY MOUTH EVERY DAY SOLD: 2021                 

                 [facility]

 

          25 mg               2021 12:00:00 AM EDT tablet    180          

       TAKE TWO TABLETS BY MOUTH EVERY 

DAY        TAKE TWO TABLETS BY MOUTH EVERY DAY SOLD: 2021                 

                 [facility]

 

          400 mg              2021 12:00:00 AM EDT capsule   90           

       TAKE ONE CAPSULE BY MOUTH TWICE

A DAY      TAKE ONE CAPSULE BY MOUTH TWICE A DAY SOLD: 2021               

                   [facility]

 

          400 mg              2021 12:00:00 AM EDT capsule   90           

       TAKE ONE CAPSULE BY MOUTH TWICE

A DAY      TAKE ONE CAPSULE BY MOUTH TWICE A DAY SOLD: 10/20/2021               

                   [facility]

 

          25 mg               2021 12:00:00 AM EDT tablet    90           

       TAKE ONE TABLET BY MOUTH EVERY 

DAY        TAKE ONE TABLET BY MOUTH EVERY DAY SOLD: 09/10/2021                  

                [facility]

 

          1,250 mcg (50,000 unit)           2021 12:00:00 AM EDT capsule  

 12                  TAKE ONE 

CAPSULE BY MOUTH WEEKLY TAKE ONE CAPSULE BY MOUTH WEEKLY SOLD: 09/10/2021       

                           

[facility]

 

          90 mcg/actuation           2021 12:00:00 AM EDT HFA aerosol inha

ler 8                   INHALE TWO

PUFFS BY MOUTH EVERY 6 HOURS AS NEEDED  INHALE TWO PUFFS BY MOUTH EVERY 6 HOURS 

AS NEEDED    SOLD: 2021                                        [facility]

[facility]

 

                          Amlodipine 5 MG / Benazepril hydrochloride 10 MG Oral 

Capsule 5-10 mg AMLODIPINE

BESYLATE/BENAZEPRIL 2021 12:00:00 AM EDT capsule      90                  

      TAKE ONE CAPSULE BY 

MOUTH EVERY DAY TAKE ONE CAPSULE BY MOUTH EVERY DAY SOLD: 2021            

                      

[facility]

 

                                        Brimonidine tartrate 2 MG/ML / Timolol 5

 MG/ML Ophthalmic Solution [Combigan] 

0.2-0.5 %  BRIMONIDINE TARTRATE/TIMOLOL 2021 12:00:00 AM EDT drops      15

                    

INSTILL 1 DROP IN EACH EYE TWO TIMES A DAY INSTILL 1 DROP IN EACH EYE TWO TIMES 

A DAY        SOLD: 2021                                        [facility]

[facility]

 

                                        Brimonidine tartrate 2 MG/ML / Timolol 5

 MG/ML Ophthalmic Solution [Combigan] 

0.2-0.5 %  BRIMONIDINE TARTRATE/TIMOLOL 2021 12:00:00 AM EDT drops      15

                    

INSTILL 1 DROP IN EACH EYE TWO TIMES A DAY INSTILL 1 DROP IN EACH EYE TWO TIMES 

A DAY        SOLD: 2021                                        [facility]

[facility]

 

                                        Brimonidine tartrate 2 MG/ML / Timolol 5

 MG/ML Ophthalmic Solution [Combigan] 

Combigan 0.2-0.5% Ophthalmic Solution Combigan 0.2-0.5% Ophthalmic Solution 

2021 12:00:00 AM EDT                                         active       

           brimonidine tartrate 2 MG/ML / 

timolol 5 MG/ML Ophthalmic Solution [Combigan] [facility] (Gavin Munguia MD 

[facility])

 

                                        travoprost 0.04 MG/ML Ophthalmic Solutio

n [Travatan] Travatan Z 0.004% 

Ophthalmic Solution       Travatan Z 0.004% Ophthalmic Solution 2021 12:00

:00 AM

 EDT          1                           aborted               travoprost 0.04 

MG/ML Ophthalmic Solution [Travatan] 

FERN (Gavin Munguia MD [facility])

 

                                        Brimonidine tartrate 2 MG/ML / Timolol 5

 MG/ML Ophthalmic Solution [Combigan] 

Combigan 0.2-0.5% Ophthalmic Solution Combigan 0.2-0.5% Ophthalmic Solution 

2021 12:00:00 AM EDT                                         aborted      

           brimonidine tartrate 2 MG/ML / 

timolol 5 MG/ML Ophthalmic Solution [Combigan] FERN (Gavin Munguia MD 

[facility])

 

          2.5 mg              2021 12:00:00 AM EDT tablet extended release

 24hr 90                  TAKE ONE 

TABLET BY MOUTH EVERY DAY TAKE ONE TABLET BY MOUTH EVERY DAY SOLD: 2021   

              

                                                    [facility]

 

          0.4 mg              2021 12:00:00 AM EDT capsule   90           

       TAKE ONE CAPSULE BY MOUTH EVERY

 DAY       TAKE ONE CAPSULE BY MOUTH EVERY DAY SOLD: 2021                 

                 [facility]

 

                                        Brimonidine tartrate 2 MG/ML / Timolol 5

 MG/ML Ophthalmic Solution [Combigan] 

Combigan 0.2-0.5% Ophthalmic Solution Combigan 0.2-0.5% Ophthalmic Solution 

06/15/2021 12:00:00 AM EDT                                         aborted      

           brimonidine tartrate 2 MG/ML / 

timolol 5 MG/ML Ophthalmic Solution [Combigan] FERN (Gavin Munguia MD 

[facility])

 

                                        Brimonidine tartrate 2 MG/ML / Timolol 5

 MG/ML Ophthalmic Solution [Combigan] 

0.2-0.5 %  BRIMONIDINE TARTRATE/TIMOLOL 06/15/2021 12:00:00 AM EDT drops      5 

                    

INSTILL 1 DROP IN EACH EYE TWO TIMES A DAY INSTILL 1 DROP IN EACH EYE TWO TIMES 

A DAY        SOLD: 2021                                        [facility]

s

 

          400 mg              2021 12:00:00 AM EDT capsule   90           

       TAKE ONE CAPSULE BY MOUTH TWICE

 A DAY     TAKE ONE CAPSULE BY MOUTH TWICE A DAY SOLD: 2021               

                   [facility]

 

          400 mg              2021 12:00:00 AM EDT capsule   90           

       TAKE ONE CAPSULE BY MOUTH TWICE

 A DAY     TAKE ONE CAPSULE BY MOUTH TWICE A DAY SOLD: 2021               

                   [facility]

 

          25 mg               2021 12:00:00 AM EDT tablet    180          

       TAKE TWO TABLETS BY MOUTH EVERY 

DAY        TAKE TWO TABLETS BY MOUTH EVERY DAY SOLD: 2021                 

                 [facility]

 

          1,250 mcg (50,000 unit)           2021 12:00:00 AM EDT capsule  

 12                  TAKE 1 CAPSULE

 BY MOUTH ONCE A WEEK TAKE 1 CAPSULE BY MOUTH ONCE A WEEK SOLD: 2021      

                      

                                        [facility]

 

                                        Brimonidine tartrate 2 MG/ML / Timolol 5

 MG/ML Ophthalmic Solution [Combigan] 

Combigan 0.2-0.5% Ophthalmic Solution Combigan 0.2-0.5% Ophthalmic Solution 

05/10/2021 12:00:00 AM EDT                                         aborted      

           brimonidine tartrate 2 MG/ML / 

timolol 5 MG/ML Ophthalmic Solution [Combigan] FERN (Gavin Munguia MD 

[facility])

 

                                        Brimonidine tartrate 2 MG/ML / Timolol 5

 MG/ML Ophthalmic Solution [Combigan] 

0.2-0.5 %  BRIMONIDINE TARTRATE/TIMOLOL 05/10/2021 12:00:00 AM EDT drops      5 

                    

INSTILL 1 DROP TWO TIMES A DAY IN BOTH EYES INSTILL 1 DROP TWO TIMES A DAY IN 

BOTH EYES    SOLD: 2021                                        [facility]

s

 

                36,000-114,000- 180,000 unit                 2021 12:00:00

 AM EDT capsule,delayed 

release(DR/EC)  90                              TAKE ONE CAPSULE BY MOUTH THREE 

TIMES A DAY WITH EACH MEAL 

TAKE ONE CAPSULE BY MOUTH THREE TIMES A DAY WITH EACH MEAL SOLD: 09/10/2021     

                            

                                        [facility]

 

                36,000-114,000- 180,000 unit                 2021 12:00:00

 AM EDT capsule,delayed 

release(DR/EC)  90                              TAKE ONE CAPSULE BY MOUTH THREE 

TIMES A DAY WITH EACH MEAL 

TAKE ONE CAPSULE BY MOUTH THREE TIMES A DAY WITH EACH MEAL SOLD: 2021     

                            

                                        [facility]

 

                36,000-114,000- 180,000 unit                 2021 12:00:00

 AM EDT capsule,delayed 

release(DR/EC)  90                              TAKE ONE CAPSULE BY MOUTH THREE 

TIMES A DAY WITH EACH MEAL 

TAKE ONE CAPSULE BY MOUTH THREE TIMES A DAY WITH EACH MEAL SOLD: 2021     

                            

                                        [facility]

 

                36,000-114,000- 180,000 unit                 2021 12:00:00

 AM EDT capsule,delayed 

release(DR/EC)  90                              TAKE ONE CAPSULE BY MOUTH THREE 

TIMES A DAY WITH EACH MEAL 

TAKE ONE CAPSULE BY MOUTH THREE TIMES A DAY WITH EACH MEAL SOLD: 2021     

                            

                                        [facility]

 

                          Amlodipine 5 MG / Benazepril hydrochloride 10 MG Oral 

Capsule 5-10 mg AMLODIPINE

 BESYLATE/BENAZEPRIL 2021 12:00:00 AM EDT capsule      90                 

       TAKE ONE CAPSULE BY

 MOUTH EVERY DAY TAKE ONE CAPSULE BY MOUTH EVERY DAY SOLD: 04/15/2021           

                       

[facility]

 

          2.5 mg              2021 12:00:00 AM EDT tablet extended release

 24hr 90                  TAKE ONE 

TABLET BY MOUTH EVERY DAY TAKE ONE TABLET BY MOUTH EVERY DAY SOLD: 2021   

              

                                                    [facility]

 

          0.4 mg              03/15/2021 12:00:00 AM EDT capsule   90           

       TAKE ONE CAPSULE BY MOUTH EVERY

 DAY       TAKE ONE CAPSULE BY MOUTH EVERY DAY SOLD: 2021                 

                 [facility]

 

          Atenolol 25 MG Oral Tablet ATENOLOL  03/15/2021 12:00:00 AM EDT tablet

    180                 TAKE

 TWO TABLETS BY MOUTH EVERY DAY TAKE TWO TABLETS BY MOUTH EVERY DAY SOLD: 

2021                                                      [facility]

 

          1,250 mcg (50,000 unit)           2021 12:00:00 AM EST capsule  

 12                  TAKE ONE 

CAPSULE BY MOUTH ONCE WEEKLY TAKE ONE CAPSULE BY MOUTH ONCE WEEKLY SOLD: 

2021                                                      [facility]

 

          400 mg              2021 12:00:00 AM EST capsule   90           

       TAKE ONE CAPSULE BY MOUTH TWICE

 A DAY     TAKE ONE CAPSULE BY MOUTH TWICE A DAY SOLD: 2021               

                   [facility]

 

          400 mg              2021 12:00:00 AM EST capsule   90           

       TAKE ONE CAPSULE BY MOUTH TWICE

 A DAY     TAKE ONE CAPSULE BY MOUTH TWICE A DAY SOLD: 2021               

                   [facility]

 

                atorvastatin 20 MG Oral Tablet ATORVASTATIN CALCIUM 2021 1

2:00:00 AM EST 

tablet          45                              TAKE ONE TABLET BY MOUTH EVERY O

THER DAY TAKE ONE TABLET BY MOUTH 

EVERY OTHER DAY SOLD: 2021                                        [facility]

 

                atorvastatin 20 MG Oral Tablet ATORVASTATIN CALCIUM 2021 1

2:00:00 AM EST 

tablet          45                              TAKE ONE TABLET BY MOUTH EVERY O

THER DAY TAKE ONE TABLET BY MOUTH 

EVERY OTHER DAY SOLD: 2021                                        [facility]

 

                atorvastatin 20 MG Oral Tablet ATORVASTATIN CALCIUM 2021 1

2:00:00 AM EST 

tablet          45                              TAKE ONE TABLET BY MOUTH EVERY O

THER DAY TAKE ONE TABLET BY MOUTH 

EVERY OTHER DAY SOLD: 2021                                        [facility]

 

          BLOOD SUGAR DIAGNOSTIC           2021 12:00:00 AM EST strip     

200                 USE AS DIRECTED 

TO TEST TWO TIMES A DAY   USE AS DIRECTED TO TEST TWO TIMES A DAY SOLD: 20

21

                                                                [facility]

 

          2.5 mg              2021 12:00:00 AM EST tablet extended release

 24hr 90                  TAKE ONE 

TABLET BY MOUTH EVERY DAY TAKE ONE TABLET BY MOUTH EVERY DAY SOLD: 2021   

              

                                                    [facility]

 

          5-10 mg             2021 12:00:00 AM EST capsule   90           

       TAKE ONE CAPSULE BY MOUTH 

EVERY DAY  TAKE ONE CAPSULE BY MOUTH EVERY DAY SOLD: 2021                 

                 [facility]

 

          1,250 mcg (50,000 unit)           2020 12:00:00 AM EST capsule  

 12                  TAKE ONE 

CAPSULE BY MOUTH ONCE WEEKLY TAKE ONE CAPSULE BY MOUTH ONCE WEEKLY SOLD: 

2020                                                      [facility]

 

          400 mg              2020 12:00:00 AM EST capsule   90           

       TAKE ONE CAPSULE BY MOUTH TWICE

 A DAY     TAKE ONE CAPSULE BY MOUTH TWICE A DAY SOLD: 2021               

                   [facility]

 

          400 mg              2020 12:00:00 AM EST capsule   90           

       TAKE ONE CAPSULE BY MOUTH TWICE

 A DAY     TAKE ONE CAPSULE BY MOUTH TWICE A DAY SOLD: 2020               

                   [facility]

 

          0.4 mg              2020 12:00:00 AM EDT capsule   90           

       TAKE ONE CAPSULE BY MOUTH EVERY

 DAY       TAKE ONE CAPSULE BY MOUTH EVERY DAY SOLD: 2020                 

                 [facility]

 

          0.4 mg              2020 12:00:00 AM EDT capsule   90           

       TAKE ONE CAPSULE BY MOUTH EVERY

 DAY       TAKE ONE CAPSULE BY MOUTH EVERY DAY SOLD: 2020                 

                 [facility]

 

          Atenolol 25 MG Oral Tablet ATENOLOL  2020 12:00:00 AM EDT tablet

    180                 TAKE

 TWO TABLETS BY MOUTH EVERY DAY TAKE TWO TABLETS BY MOUTH EVERY DAY SOLD: 

2020                                                      [facility]

 

          25 mg               2020 12:00:00 AM EDT tablet    180          

       TAKE TWO TABLETS BY MOUTH EVERY 

DAY        TAKE TWO TABLETS BY MOUTH EVERY DAY SOLD: 2020                 

                 [facility]

 

          90 mcg/actuation           2020 12:00:00 AM EDT HFA aerosol inha

ler 8                   INHALE TWO

 PUFFS BY MOUTH EVERY 6 HOURS AS NEEDED INHALE TWO PUFFS BY MOUTH EVERY 6 HOURS 

AS NEEDED    SOLD: 2020                                        [facility]

[facility]

 

          25 mg               2020 12:00:00 AM EDT tablet    90           

       TAKE ONE TABLET BY MOUTH EVERY 

DAY        TAKE ONE TABLET BY MOUTH EVERY DAY SOLD: 2020                  

                [facility]

 

          25 mg               2020 12:00:00 AM EDT tablet    90           

       TAKE ONE TABLET BY MOUTH EVERY 

DAY        TAKE ONE TABLET BY MOUTH EVERY DAY SOLD: 2021                  

                [facility]

 

          1,250 mcg (50,000 unit)           2020 12:00:00 AM EDT capsule  

 12                  TAKE ONE 

CAPSULE BY MOUTH WEEKLY TAKE ONE CAPSULE BY MOUTH WEEKLY SOLD: 2020       

                           

[facility]

 

          2.5 mg              2020 12:00:00 AM EDT tablet extended release

 24hr 90                  TAKE ONE 

TABLET BY MOUTH EVERY DAY TAKE ONE TABLET BY MOUTH EVERY DAY SOLD: 2020   

              

                                                    [facility]

 

          5-10 mg             2020 12:00:00 AM EDT capsule   90           

       TAKE ONE CAPSULE BY MOUTH 

EVERY DAY  TAKE ONE CAPSULE BY MOUTH EVERY DAY SOLD: 10/13/2020                 

                 [facility]

 

          400 mg              2020 12:00:00 AM EDT capsule   90           

       TAKE ONE CAPSULE BY MOUTH TWICE

 A DAY     TAKE ONE CAPSULE BY MOUTH TWICE A DAY SOLD: 10/13/2020               

                   [facility]

 

                                        Brimonidine tartrate 2 MG/ML / Timolol 5

 MG/ML Ophthalmic Solution [Combigan] 

Combigan 0.2-0.5% Ophthalmic Solution Combigan 0.2-0.5% Ophthalmic Solution 

2020 12:00:00 AM EDT                                         aborted      

           brimonidine tartrate 2 MG/ML / 

timolol 5 MG/ML Ophthalmic Solution [Combigan] FERN (Gavin Munguia MD 

[facility])

 

          0.2-0.5 %           2020 12:00:00 AM EDT drops     15           

       INSTILL 1 DROP INTO BOTH EYES 

TWICE A DAY INSTILL 1 DROP INTO BOTH EYES TWICE A DAY SOLD: 2021          

                        

[facility]

 

          0.2-0.5 %           2020 12:00:00 AM EDT drops     15           

       INSTILL 1 DROP INTO BOTH EYES 

TWICE A DAY INSTILL 1 DROP INTO BOTH EYES TWICE A DAY SOLD: 2020          

                        

[facility]

 

          0.2-0.5 %           2020 12:00:00 AM EDT drops     15           

       INSTILL 1 DROP INTO BOTH EYES 

TWICE A DAY INSTILL 1 DROP INTO BOTH EYES TWICE A DAY SOLD: 2020          

                        

[facility]

 

                atorvastatin 20 MG Oral Tablet ATORVASTATIN CALCIUM 2020 1

2:00:00 AM EST 

tablet          45                              TAKE ONE TABLET BY MOUTH EVERY O

THER DAY TAKE ONE TABLET BY MOUTH 

EVERY OTHER DAY SOLD: 12/10/2020                                        [facility]

 

                atorvastatin 20 MG Oral Tablet ATORVASTATIN CALCIUM 2020 1

2:00:00 AM EST 

tablet          45                              TAKE ONE TABLET BY MOUTH EVERY O

THER DAY TAKE ONE TABLET BY MOUTH 

EVERY OTHER DAY SOLD: 2020                                        [facility]

 

                                        travoprost 0.04 MG/ML Ophthalmic Solutio

n [Travatan] Travatan Z 0.004% 

Ophthalmic Solution       Travatan Z 0.004% Ophthalmic Solution 2019 12:00

:00 AM

 EST          1                           aborted               travoprost 0.04 

MG/ML Ophthalmic Solution [Travatan] 

FERN (Gavin Munguia MD [facility])

 

                                        12 HR CHLORPHENIRAMINE POLISTIREX 1.6 MG

/ML / HYDROCODONE POLISTIREX 2 MG/ML 

Extended Release Suspension hydrocodone 10 mg-chlorpheniramine 8 mg/5 mL oral 
susp extend.rel 12hr TAKE 5ML BY MOUTH EVERY 12 HOURS AS NEEDED FOR COUGH 
MAXIMUM DAILY DOSE   10ML               hydrocodone 10 mg-chlorpheniramine 8 mg/

5 mL oral susp

 extend.rel 12hr TAKE 5ML BY MOUTH EVERY 12 HOURS AS NEEDED FOR COUGH MAXIMUM 
DAILY DOSE   10ML                                           completed           

    12 HR chlorpheniramine polistirex 

1.6 MG/ML / hydrocodone polistirex 2 MG/ML Extended Release Suspension SAIDA 

([facility])

 

                                        Ciprofloxacin 3 MG/ML Ophthalmic Solutio

n ciprofloxacin 0.3 % eye drops STARTING

 1 DAY PRIOR TO SURGERY PLACE 1 DROP IN THE RIGHT EYE FOUR TIMES A DAY THEN 
CONTINUE FOR 1 WEEK                     ciprofloxacin 0.3 % eye drops STARTING 1

 DAY PRIOR TO 

SURGERY PLACE 1 DROP IN THE RIGHT EYE FOUR TIMES A DAY THEN CONTINUE FOR 1 WEEK 

                                          completed               ciprofloxacin 

3 MG/ML Ophthalmic Solution SAIDA ([facility]

 [facility])

 

                                        Ergocalciferol 50244 UNT Oral Capsule Vi

tamin D2 1,250 mcg (50,000 unit) capsule

 TAKE ONE CAPSULE BY MOUTH WEEKLY       Vitamin D2 1,250 mcg (50,000 unit) capsu

le 

TAKE ONE CAPSULE BY MOUTH WEEKLY                                           compl

eted               ergocalciferol 1.25 

MG Oral Capsule                         SAIDA ([facility])

 

                                        travoprost 0.04 MG/ML Ophthalmic Solutio

n [Travatan] Travatan Z 0.004 % eye 

drops INSTILL 1 DROP IN EACH EYE IN THE EVENING Travatan Z 0.004 % eye drops 

INSTILL 1 DROP IN EACH EYE IN THE EVENING                                       

    completed               travoprost 

0.04 MG/ML Ophthalmic Solution [Travatan] SAIDA ([facility])

 

                                        200 ACTUAT Albuterol 0.09 MG/ACTUAT Mete

red Dose Inhaler [ProAir] ProAir HFA 90 

mcg/actuation aerosol inhaler INHALE TWO PUFFS BY MOUTH EVERY 6 HOURS AS NEEDED 

ProAir HFA 90 mcg/actuation aerosol inhaler INHALE TWO PUFFS BY MOUTH EVERY 6 
HOURS AS NEEDED                                           completed             

  [id number] 200 ACTUAT albuterol 0.09 

MG/ACTUAT Metered Dose Inhaler [ProAir] SAIDA ([facility])

 

                                        Ketorolac Tromethamine 4 MG/ML Ophthalmi

c Solution ketorolac 0.4 % eye drops 

STARTING 1 DAY PRIOR TO SURGERY PLACE 1 DROP IN THE RIGHT EYE FOUR TIMES A DAY 
THEN TAPER AS DIRECTED                  ketorolac 0.4 % eye drops STARTING 1 DAY

 PRIOR TO SURGERY

 PLACE 1 DROP IN THE RIGHT EYE FOUR TIMES A DAY THEN TAPER AS DIRECTED          

                                            

                      completed                        ketorolac tromethamine 4 

MG/ML Ophthalmic Solution SAIDA ([facility]

 [facility])

 

                                        prednisolone acetate 10 MG/ML Ophthalmic

 Suspension prednisolone acetate 1 % eye

 drops,suspension INSTILL ONE DROP IN THE RIGHT EYE FOUR TIMES A DAY FOR 1 WEEK 
THEN TAPER AS DIRECTED                  prednisolone acetate 1 % eye drops,suspe

nsion INSTILL ONE

 DROP IN THE RIGHT EYE FOUR TIMES A DAY FOR 1 WEEK THEN TAPER AS DIRECTED       

                                          

                              completed                     prednisolone acetate

 10 MG/ML Ophthalmic Suspension SAIDA 

([facility])

 

                                        Ketorolac Tromethamine 4 MG/ML Ophthalmi

c Solution ketorolac 0.4 % eye drops 

STARTING 1 DAY PRIOR TO SURGERY PLACE 1 DROP IN THE RIGHT EYE FOUR TIMES A DAY 
THEN TAPER AS DIRECTED                  ketorolac 0.4 % eye drops STARTING 1 DAY

 PRIOR TO SURGERY

 PLACE 1 DROP IN THE RIGHT EYE FOUR TIMES A DAY THEN TAPER AS DIRECTED          

                                            

                      completed                        ketorolac tromethamine 4 

MG/ML Ophthalmic Solution SAIDA ([facility]

 [facility])

 

                                        Cholecalciferol 47247 UNT Oral Capsule c

holecalciferol (vitamin D3) 1,250 mcg 

(50,000 unit) capsule TAKE 1 CAPSULE BY MOUTH ONCE A WEEK cholecalciferol 

(vitamin D3) 1,250 mcg (50,000 unit) capsule TAKE 1 CAPSULE BY MOUTH ONCE A WEEK
                                          completed             cholecalciferol 

1.25 MG Oral Capsule SAIDA ([facility] 

[facility])

 

                                        12 HR CHLORPHENIRAMINE POLISTIREX 1.6 MG

/ML / HYDROCODONE POLISTIREX 2 MG/ML 

Extended Release Suspension hydrocodone 10 mg-chlorpheniramine 8 mg/5 mL oral 
susp extend.rel 12hr TAKE 5ML BY MOUTH EVERY 12 HOURS AS NEEDED FOR COUGH 
MAXIMUM DAILY DOSE   10ML               hydrocodone 10 mg-chlorpheniramine 8 mg/

5 mL oral susp

 extend.rel 12hr TAKE 5ML BY MOUTH EVERY 12 HOURS AS NEEDED FOR COUGH MAXIMUM 
DAILY DOSE   10ML                                           completed           

    12 HR chlorpheniramine polistirex 

1.6 MG/ML / hydrocodone polistirex 2 MG/ML Extended Release Suspension SAIDA 

([facility])

 

                                        Ciprofloxacin 3 MG/ML Ophthalmic Solutio

n ciprofloxacin 0.3 % eye drops STARTING

 1 DAY PRIOR TO SURGERY PLACE 1 DROP IN THE RIGHT EYE FOUR TIMES A DAY THEN 
CONTINUE FOR 1 WEEK                     ciprofloxacin 0.3 % eye drops STARTING 1

 DAY PRIOR TO 

SURGERY PLACE 1 DROP IN THE RIGHT EYE FOUR TIMES A DAY THEN CONTINUE FOR 1 WEEK 

                                          completed               ciprofloxacin 

3 MG/ML Ophthalmic Solution SAIDA ([facility]

 [facility])

 

                                        200 ACTUAT Albuterol 0.09 MG/ACTUAT Mete

red Dose Inhaler [ProAir] ProAir HFA 90 

mcg/actuation aerosol inhaler INHALE TWO PUFFS BY MOUTH EVERY 6 HOURS AS NEEDED 

ProAir HFA 90 mcg/actuation aerosol inhaler INHALE TWO PUFFS BY MOUTH EVERY 6 
HOURS AS NEEDED                                           completed             

  [id number] 200 ACTUAT albuterol 0.09 

MG/ACTUAT Metered Dose Inhaler [ProAir] SAIDA ([facility])

 

                          Loperamide Hydrochloride 2 MG Oral Capsule loperamide 

2 mg capsule loperamide 2 

mg capsule                                     completed             loperamide 

hydrochloride 2 MG Oral Capsule 

SAIDA ([facility])

 

                          gabapentin 300 MG Oral Capsule gabapentin 300 mg capsu

le gabapentin 300 mg 

capsule                                     completed             gabapentin 300

 MG Oral Capsule SAIDA ([facility] 

[facility])

 

                                        200 ACTUAT Albuterol 0.09 MG/ACTUAT Mete

red Dose Inhaler [ProAir] ProAir HFA 90 

mcg/actuation aerosol inhaler INHALE TWO PUFFS BY MOUTH EVERY 6 HOURS AS NEEDED 

ProAir HFA 90 mcg/actuation aerosol inhaler INHALE TWO PUFFS BY MOUTH EVERY 6 
HOURS AS NEEDED                                           completed             

  [id number] 200 ACTUAT albuterol 0.09 

MG/ACTUAT Metered Dose Inhaler [ProAir] SAIDA ([facility])

 

                                        Cholecalciferol 20194 UNT Oral Capsule c

holecalciferol (vitamin D3) 1,250 mcg 

(50,000 unit) capsule TAKE 1 CAPSULE BY MOUTH ONCE A WEEK cholecalciferol 

(vitamin D3) 1,250 mcg (50,000 unit) capsule TAKE 1 CAPSULE BY MOUTH ONCE A WEEK
                                          completed             cholecalciferol 

1.25 MG Oral Capsule SAIDA ([facility] 

[facility])

 

                                        Flucelvax Quad [id number] 60 mcg (15 mcg 

x 4)/0.5 mL intramuscular susp INJECT 

0.5ML INTRAMUSCULARLY [id number]                                     completed      

       influenza A virus 

A/ (H1N1) antigen 0.03 MG/ML / influenza A virus A/ 
(H3N2) antigen 0.03 MG/ML / influenza B virus B/ antigen 0.03 MG/ML 
/ influenza B virus B/[place]/ antigen 0.03 MG/ML Injectable
 Suspension [Flucelvax Quadrivalent [id number]] SAIDA ([facility])

 

                                        12 HR CHLORPHENIRAMINE POLISTIREX 1.6 MG

/ML / HYDROCODONE POLISTIREX 2 MG/ML 

Extended Release Suspension hydrocodone 10 mg-chlorpheniramine 8 mg/5 mL oral 
susp extend.rel 12hr TAKE 5ML BY MOUTH EVERY 12 HOURS AS NEEDED FOR COUGH 
MAXIMUM DAILY DOSE   10ML               hydrocodone 10 mg-chlorpheniramine 8 mg/

5 mL oral susp

extend.rel 12hr TAKE 5ML BY MOUTH EVERY 12 HOURS AS NEEDED FOR COUGH MAXIMUM 
DAILY DOSE   10ML                                           completed           

    12 HR chlorpheniramine polistirex 

1.6 MG/ML / hydrocodone polistirex 2 MG/ML Extended Release Suspension SAIDA 

([facility])

 

                          Loperamide Hydrochloride 2 MG Oral Capsule loperamide 

2 mg capsule loperamide 2 

mg capsule                                     completed             loperamide 

hydrochloride 2 MG Oral Capsule 

SAIDA ([facility])

 

                                        travoprost 0.04 MG/ML Ophthalmic Solutio

n [Travatan] Travatan Z 0.004 % eye 

drops INSTILL 1 DROP IN EACH EYE IN THE EVENING Travatan Z 0.004 % eye drops 

INSTILL 1 DROP IN EACH EYE IN THE EVENING                                       

    completed               travoprost 

0.04 MG/ML Ophthalmic Solution [Travatan] SAIDA ([facility])

 

                                        Cholecalciferol 58700 UNT Oral Capsule c

holecalciferol (vitamin D3) 1,250 mcg 

(50,000 unit) capsule TAKE 1 CAPSULE BY MOUTH ONCE A WEEK cholecalciferol 

(vitamin D3) 1,250 mcg (50,000 unit) capsule TAKE 1 CAPSULE BY MOUTH ONCE A WEEK
                                          completed             cholecalciferol 

1.25 MG Oral Capsule SAIDA ([facility])

 

                                        travoprost 0.04 MG/ML Ophthalmic Solutio

n [Travatan] Travatan Z 0.004 % eye 

drops INSTILL 1 DROP IN EACH EYE IN THE EVENING Travatan Z 0.004 % eye drops 

INSTILL 1 DROP IN EACH EYE IN THE EVENING                                       

    completed               travoprost 

0.04 MG/ML Ophthalmic Solution [Travatan] SAIDA ([facility])

 

                                        Flucelvax Quad [id number] 60 mcg (15 mcg 

x 4)/0.5 mL intramuscular susp INJECT 

0.5ML INTRAMUSCULARLY [id number]                                     completed      

       influenza A virus 

A/ (H1N1) antigen 0.03 MG/ML / influenza A virus A/ 
(H3N2) antigen 0.03 MG/ML / influenza B virus B/ antigen 0.03 MG/ML 
/ influenza B virus B/[place]-/2016 antigen 0.03 MG/ML Injectable
 Suspension [Flucelvax Quadrivalent 0408-4867] SAIDA ([facility])

 

                                        Ergocalciferol 82601 UNT Oral Capsule Vi

tamin D2 1,250 mcg (50,000 unit) capsule

TAKE ONE CAPSULE BY MOUTH WEEKLY        Vitamin D2 1,250 mcg (50,000 unit) capsu

le TAKE

ONE CAPSULE BY MOUTH WEEKLY                                           completed 

              ergocalciferol 1.25 MG 

Oral Capsule                            SAIDA ([facility])

 

                                        Cholecalciferol 94426 UNT Oral Capsule c

holecalciferol (vitamin D3) 1,250 mcg 

(50,000 unit) capsule TAKE 1 CAPSULE BY MOUTH ONCE A WEEK cholecalciferol 

(vitamin D3) 1,250 mcg (50,000 unit) capsule TAKE 1 CAPSULE BY MOUTH ONCE A WEEK
                                          completed             cholecalciferol 

1.25 MG Oral Capsule SAIDA ([facility])

 

                                        Flucelvax Quad 9450-6181 60 mcg (15 mcg 

x 4)/0.5 mL intramuscular susp INJECT 

0.5ML INTRAMUSCULARLY [id number]                                     completed      

       influenza A virus 

A/[place] (H1N1) antigen 0.03 MG/ML / influenza A virus A/ 
(H3N2) antigen 0.03 MG/ML / influenza B virus B/ antigen 0.03 MG/ML 
/ influenza B virus B/[facility]/[place]- antigen 0.03 MG/ML Injectable
Suspension [Flucelvax Quadrivalent [id number]] SAIDA ([facility])

 

                                        travoprost 0.04 MG/ML Ophthalmic Solutio

n [Travatan] Travatan Z 0.004 % eye 

drops INSTILL 1 DROP IN EACH EYE IN THE EVENING Travatan Z 0.004 % eye drops 

INSTILL 1 DROP IN EACH EYE IN THE EVENING                                       

    completed               travoprost 

0.04 MG/ML Ophthalmic Solution [Travatan] SAIDA ([facility])

 

                          Loperamide Hydrochloride 2 MG Oral Capsule loperamide 

2 mg capsule loperamide 2 

mg capsule                                     completed             loperamide 

hydrochloride 2 MG Oral Capsule 

SAIDA ([facility])

 

                                        prednisolone acetate 10 MG/ML Ophthalmic

 Suspension prednisolone acetate 1 % eye

 drops,suspension INSTILL ONE DROP IN THE RIGHT EYE FOUR TIMES A DAY FOR 1 WEEK 
THEN TAPER AS DIRECTED                  prednisolone acetate 1 % eye drops,suspe

nsion INSTILL ONE

 DROP IN THE RIGHT EYE FOUR TIMES A DAY FOR 1 WEEK THEN TAPER AS DIRECTED       

                                          

                              completed                     prednisolone acetate

 10 MG/ML Ophthalmic Suspension SAIDA 

([facility])

 

                          Loperamide Hydrochloride 2 MG Oral Capsule loperamide 

2 mg capsule loperamide 2 

mg capsule                                     completed             loperamide 

hydrochloride 2 MG Oral Capsule 

SAIDA ([facility])

 

                                        Ketorolac Tromethamine 4 MG/ML Ophthalmi

c Solution ketorolac 0.4 % eye drops 

STARTING 1 DAY PRIOR TO SURGERY PLACE 1 DROP IN THE RIGHT EYE FOUR TIMES A DAY 
THEN TAPER AS DIRECTED                  ketorolac 0.4 % eye drops STARTING 1 DAY

 PRIOR TO SURGERY

 PLACE 1 DROP IN THE RIGHT EYE FOUR TIMES A DAY THEN TAPER AS DIRECTED          

                                            

                      completed                        ketorolac tromethamine 4 

MG/ML Ophthalmic Solution SAIDA ([facility])

 

                                        12 HR CHLORPHENIRAMINE POLISTIREX 1.6 MG

/ML / HYDROCODONE POLISTIREX 2 MG/ML 

Extended Release Suspension hydrocodone 10 mg-chlorpheniramine 8 mg/5 mL oral 
susp extend.rel 12hr TAKE 5ML BY MOUTH EVERY 12 HOURS AS NEEDED FOR COUGH 
MAXIMUM DAILY DOSE   10ML               hydrocodone 10 mg-chlorpheniramine 8 mg/

5 mL oral susp

 extend.rel 12hr TAKE 5ML BY MOUTH EVERY 12 HOURS AS NEEDED FOR COUGH MAXIMUM 
DAILY DOSE   10ML                                           completed           

    12 HR chlorpheniramine polistirex 

1.6 MG/ML / hydrocodone polistirex 2 MG/ML Extended Release Suspension SAIDA 

([facility])

 

                                        Cholecalciferol 17609 UNT Oral Capsule c

holecalciferol (vitamin D3) 1,250 mcg 

(50,000 unit) capsule TAKE 1 CAPSULE BY MOUTH ONCE A WEEK cholecalciferol 

(vitamin D3) 1,250 mcg (50,000 unit) capsule TAKE 1 CAPSULE BY MOUTH ONCE A WEEK
                                          completed             cholecalciferol 

1.25 MG Oral Capsule SAIDA ([facility])

 

                                        Ketorolac Tromethamine 4 MG/ML Ophthalmi

c Solution ketorolac 0.4 % eye drops 

STARTING 1 DAY PRIOR TO SURGERY PLACE 1 DROP IN THE RIGHT EYE FOUR TIMES A DAY 
THEN TAPER AS DIRECTED                  ketorolac 0.4 % eye drops STARTING 1 DAY

 PRIOR TO SURGERY

 PLACE 1 DROP IN THE RIGHT EYE FOUR TIMES A DAY THEN TAPER AS DIRECTED          

                                            

                      completed                        ketorolac tromethamine 4 

MG/ML Ophthalmic Solution SAIDA ([facility])

 

                          gabapentin 300 MG Oral Capsule gabapentin 300 mg capsu

le gabapentin 300 mg 

capsule                                     completed             gabapentin 300

 MG Oral Capsule SAIDA ([facility])

 

                                        Ergocalciferol 24253 UNT Oral Capsule Vi

tamin D2 1,250 mcg (50,000 unit) capsule

 TAKE ONE CAPSULE BY MOUTH WEEKLY       Vitamin D2 1,250 mcg (50,000 unit) capsu

le 

TAKE ONE CAPSULE BY MOUTH WEEKLY                                           compl

eted               ergocalciferol 1.25 

MG Oral Capsule                         SAIDA ([facility])

 

                          gabapentin 300 MG Oral Capsule gabapentin 300 mg capsu

le gabapentin 300 mg 

capsule                                     completed             gabapentin 300

 MG Oral Capsule SAIDA ([facility] 

[facility])

 

                                        prednisolone acetate 10 MG/ML Ophthalmic

 Suspension prednisolone acetate 1 % eye

 drops,suspension INSTILL ONE DROP IN THE RIGHT EYE FOUR TIMES A DAY FOR 1 WEEK 
THEN TAPER AS DIRECTED                  prednisolone acetate 1 % eye drops,suspe

nsion INSTILL ONE

 DROP IN THE RIGHT EYE FOUR TIMES A DAY FOR 1 WEEK THEN TAPER AS DIRECTED       

                                          

                              completed                     prednisolone acetate

 10 MG/ML Ophthalmic Suspension SAIDA 

([facility])

 

                          Loperamide Hydrochloride 2 MG Oral Capsule loperamide 

2 mg capsule loperamide 2 

mg capsule                                     completed             loperamide 

hydrochloride 2 MG Oral Capsule 

SAIDA ([facility])

 

                                        200 ACTUAT Albuterol 0.09 MG/ACTUAT Mete

red Dose Inhaler [ProAir] ProAir HFA 90 

mcg/actuation aerosol inhaler INHALE TWO PUFFS BY MOUTH EVERY 6 HOURS AS NEEDED 

ProAir HFA 90 mcg/actuation aerosol inhaler INHALE TWO PUFFS BY MOUTH EVERY 6 
HOURS AS NEEDED                                           completed             

  [id number] 200 ACTUAT albuterol 0.09 

MG/ACTUAT Metered Dose Inhaler [ProAir] SAIDA ([facility])

 

                                        200 ACTUAT Albuterol 0.09 MG/ACTUAT Mete

red Dose Inhaler [ProAir] ProAir HFA 90 

mcg/actuation aerosol inhaler INHALE TWO PUFFS BY MOUTH EVERY 6 HOURS AS NEEDED 

ProAir HFA 90 mcg/actuation aerosol inhaler INHALE TWO PUFFS BY MOUTH EVERY 6 
HOURS AS NEEDED                                           completed             

  [id number] 200 ACTUAT albuterol 0.09 

MG/ACTUAT Metered Dose Inhaler [ProAir] SAIDA ([facility])

 

                                        Cholecalciferol 64539 UNT Oral Capsule c

holecalciferol (vitamin D3) 1,250 mcg 

(50,000 unit) capsule TAKE 1 CAPSULE BY MOUTH ONCE A WEEK cholecalciferol 

(vitamin D3) 1,250 mcg (50,000 unit) capsule TAKE 1 CAPSULE BY MOUTH ONCE A WEEK
                                          completed             cholecalciferol 

1.25 MG Oral Capsule SAIDA ([facility])

 

                          gabapentin 300 MG Oral Capsule gabapentin 300 mg capsu

le gabapentin 300 mg 

capsule                                     completed             gabapentin 300

 MG Oral Capsule SAIDA ([facility])

 

                          gabapentin 300 MG Oral Capsule gabapentin 300 mg capsu

le gabapentin 300 mg 

capsule                                     completed             gabapentin 300

 MG Oral Capsule SAIDA ([facility])

 

                                        Cholecalciferol 95789 UNT Oral Capsule c

holecalciferol (vitamin D3) 1,250 mcg 

(50,000 unit) capsule TAKE 1 CAPSULE BY MOUTH ONCE A WEEK cholecalciferol 

(vitamin D3) 1,250 mcg (50,000 unit) capsule TAKE 1 CAPSULE BY MOUTH ONCE A WEEK
                                          completed             cholecalciferol 

1.25 MG Oral Capsule SAIDA ([facility])

 

                                        Ciprofloxacin 3 MG/ML Ophthalmic Solutio

n ciprofloxacin 0.3 % eye drops STARTING

 1 DAY PRIOR TO SURGERY PLACE 1 DROP IN THE RIGHT EYE FOUR TIMES A DAY THEN 
CONTINUE FOR 1 WEEK                     ciprofloxacin 0.3 % eye drops STARTING 1

 DAY PRIOR TO 

SURGERY PLACE 1 DROP IN THE RIGHT EYE FOUR TIMES A DAY THEN CONTINUE FOR 1 WEEK 

                                          completed               ciprofloxacin 

3 MG/ML Ophthalmic Solution SAIDA ([facility])

 

                                        12 HR CHLORPHENIRAMINE POLISTIREX 1.6 MG

/ML / HYDROCODONE POLISTIREX 2 MG/ML 

Extended Release Suspension hydrocodone 10 mg-chlorpheniramine 8 mg/5 mL oral 
susp extend.rel 12hr TAKE 5ML BY MOUTH EVERY 12 HOURS AS NEEDED FOR COUGH 
MAXIMUM DAILY DOSE   10ML               hydrocodone 10 mg-chlorpheniramine 8 mg/

5 mL oral susp

 extend.rel 12hr TAKE 5ML BY MOUTH EVERY 12 HOURS AS NEEDED FOR COUGH MAXIMUM 
DAILY DOSE   10ML                                           completed           

    12 HR chlorpheniramine polistirex 

1.6 MG/ML / hydrocodone polistirex 2 MG/ML Extended Release Suspension SAIDA 

([facility])

 

                                        Cholecalciferol 50263 UNT Oral Capsule c

holecalciferol (vitamin D3) 1,250 mcg 

(50,000 unit) capsule TAKE 1 CAPSULE BY MOUTH ONCE A WEEK cholecalciferol 

(vitamin D3) 1,250 mcg (50,000 unit) capsule TAKE 1 CAPSULE BY MOUTH ONCE A WEEK
                                          completed             cholecalciferol 

1.25 MG Oral Capsule SAIDA ([facility])

 

                                        12 HR CHLORPHENIRAMINE POLISTIREX 1.6 MG

/ML / HYDROCODONE POLISTIREX 2 MG/ML 

Extended Release Suspension hydrocodone 10 mg-chlorpheniramine 8 mg/5 mL oral 
susp extend.rel 12hr TAKE 5ML BY MOUTH EVERY 12 HOURS AS NEEDED FOR COUGH 
MAXIMUM DAILY DOSE   10ML               hydrocodone 10 mg-chlorpheniramine 8 mg/

5 mL oral susp

 extend.rel 12hr TAKE 5ML BY MOUTH EVERY 12 HOURS AS NEEDED FOR COUGH MAXIMUM 
DAILY DOSE   10ML                                           completed           

    12 HR chlorpheniramine polistirex 

1.6 MG/ML / hydrocodone polistirex 2 MG/ML Extended Release Suspension SAIDA 

([facility])

 

                          gabapentin 300 MG Oral Capsule gabapentin 300 mg capsu

le gabapentin 300 mg 

capsule                                     completed             gabapentin 300

 MG Oral Capsule SAIDA ([facility])

 

                                        Flucelvax Quad [id number] 60 mcg (15 mcg 

x 4)/0.5 mL intramuscular susp INJECT 

0.5ML INTRAMUSCULARLY [id number]                                     completed      

       influenza A virus 

A/[place] (H1N1) antigen 0.03 MG/ML / influenza A virus A/[place] 
(H3N2) antigen 0.03 MG/ML / influenza B virus B/ antigen 0.03 MG/ML 
/ influenza B virus B/[facility]/[place]- antigen 0.03 MG/ML Injectable
 Suspension [Flucelvax Quadrivalent 9333-7383] SAIDA ([facility])

 

                                        travoprost 0.04 MG/ML Ophthalmic Solutio

n [Travatan] Travatan Z 0.004 % eye 

drops INSTILL 1 DROP IN EACH EYE IN THE EVENING Travatan Z 0.004 % eye drops 

INSTILL 1 DROP IN EACH EYE IN THE EVENING                                       

    completed               travoprost 

0.04 MG/ML Ophthalmic Solution [Travatan] SAIDA ([facility])

 

                          gabapentin 300 MG Oral Capsule gabapentin 300 mg capsu

le gabapentin 300 mg 

capsule                                     completed             gabapentin 300

 MG Oral Capsule SAIDA ([facility])

 

                                        Ketorolac Tromethamine 4 MG/ML Ophthalmi

c Solution ketorolac 0.4 % eye drops 

STARTING 1 DAY PRIOR TO SURGERY PLACE 1 DROP IN THE RIGHT EYE FOUR TIMES A DAY 
THEN TAPER AS DIRECTED                  ketorolac 0.4 % eye drops STARTING 1 DAY

 PRIOR TO SURGERY

 PLACE 1 DROP IN THE RIGHT EYE FOUR TIMES A DAY THEN TAPER AS DIRECTED          

                                            

                      completed                        ketorolac tromethamine 4 

MG/ML Ophthalmic Solution SAIDA ([facility])

 

                                        Ciprofloxacin 3 MG/ML Ophthalmic Solutio

n ciprofloxacin 0.3 % eye drops STARTING

 1 DAY PRIOR TO SURGERY PLACE 1 DROP IN THE RIGHT EYE FOUR TIMES A DAY THEN 
CONTINUE FOR 1 WEEK                     ciprofloxacin 0.3 % eye drops STARTING 1

 DAY PRIOR TO 

SURGERY PLACE 1 DROP IN THE RIGHT EYE FOUR TIMES A DAY THEN CONTINUE FOR 1 WEEK 

                                          completed               ciprofloxacin 

3 MG/ML Ophthalmic Solution SAIDA ([facility])

 

                          gabapentin 300 MG Oral Capsule gabapentin 300 mg capsu

le gabapentin 300 mg 

capsule                                     completed             gabapentin 300

 MG Oral Capsule SAIDA ([facility])

 

                                        Flucelvax Quad [id number] 60 mcg (15 mcg 

x 4)/0.5 mL intramuscular susp INJECT 

0.5ML INTRAMUSCULARLY [id number]                                     completed      

       influenza A virus 

A/[place] (H1N1) antigen 0.03 MG/ML / influenza A virus A/[place] 
(H3N2) antigen 0.03 MG/ML / influenza B virus B/ antigen 0.03 MG/ML 
/ influenza B virus B/[facility]/[place]-/2016 antigen 0.03 MG/ML Injectable
 Suspension [Flucelvax Quadrivalent 7920-3147] SAIDA ([facility])

 

                          Loperamide Hydrochloride 2 MG Oral Capsule loperamide 

2 mg capsule loperamide 2 

mg capsule                                     completed             loperamide 

hydrochloride 2 MG Oral Capsule 

SAIDA ([facility])

 

                                        prednisolone acetate 10 MG/ML Ophthalmic

 Suspension prednisolone acetate 1 % eye

drops,suspension INSTILL ONE DROP IN THE RIGHT EYE FOUR TIMES A DAY FOR 1 WEEK 
THEN TAPER AS DIRECTED                  prednisolone acetate 1 % eye drops,suspe

nsion INSTILL ONE

DROP IN THE RIGHT EYE FOUR TIMES A DAY FOR 1 WEEK THEN TAPER AS DIRECTED        

                                          

                              completed                     prednisolone acetate

 10 MG/ML Ophthalmic Suspension SAIDA 

([facility])

 

                                        Ciprofloxacin 3 MG/ML Ophthalmic Solutio

n ciprofloxacin 0.3 % eye drops STARTING

1 DAY PRIOR TO SURGERY PLACE 1 DROP IN THE RIGHT EYE FOUR TIMES A DAY THEN 
CONTINUE FOR 1 WEEK                     ciprofloxacin 0.3 % eye drops STARTING 1

 DAY PRIOR TO 

SURGERY PLACE 1 DROP IN THE RIGHT EYE FOUR TIMES A DAY THEN CONTINUE FOR 1 WEEK 

                                          completed               ciprofloxacin 

3 MG/ML Ophthalmic Solution SAIDA ([facility])

 

                                        travoprost 0.04 MG/ML Ophthalmic Solutio

n [Travatan] Travatan Z 0.004 % eye 

drops INSTILL 1 DROP IN EACH EYE IN THE EVENING Travatan Z 0.004 % eye drops 

INSTILL 1 DROP IN EACH EYE IN THE EVENING                                       

    completed               travoprost 

0.04 MG/ML Ophthalmic Solution [Travatan] SAIDA ([facility])

 

                                        travoprost 0.04 MG/ML Ophthalmic Solutio

n [Travatan] Travatan Z 0.004 % eye 

drops INSTILL 1 DROP IN EACH EYE IN THE EVENING Travatan Z 0.004 % eye drops 

INSTILL 1 DROP IN EACH EYE IN THE EVENING                                       

    completed               travoprost 

0.04 MG/ML Ophthalmic Solution [Travatan] SAIDA ([facility])

 

                                        Ciprofloxacin 3 MG/ML Ophthalmic Solutio

n ciprofloxacin 0.3 % eye drops STARTING

 1 DAY PRIOR TO SURGERY PLACE 1 DROP IN THE RIGHT EYE FOUR TIMES A DAY THEN 
CONTINUE FOR 1 WEEK                     ciprofloxacin 0.3 % eye drops STARTING 1

 DAY PRIOR TO 

SURGERY PLACE 1 DROP IN THE RIGHT EYE FOUR TIMES A DAY THEN CONTINUE FOR 1 WEEK 

                                          completed               ciprofloxacin 

3 MG/ML Ophthalmic Solution SAIDA ([facility])

 

                                        12 HR CHLORPHENIRAMINE POLISTIREX 1.6 MG

/ML / HYDROCODONE POLISTIREX 2 MG/ML 

Extended Release Suspension hydrocodone 10 mg-chlorpheniramine 8 mg/5 mL oral 
susp extend.rel 12hr TAKE 5ML BY MOUTH EVERY 12 HOURS AS NEEDED FOR COUGH 
MAXIMUM DAILY DOSE   10ML               hydrocodone 10 mg-chlorpheniramine 8 mg/

5 mL oral susp

 extend.rel 12hr TAKE 5ML BY MOUTH EVERY 12 HOURS AS NEEDED FOR COUGH MAXIMUM 
DAILY DOSE   10ML                                           completed           

    12 HR chlorpheniramine polistirex 

1.6 MG/ML / hydrocodone polistirex 2 MG/ML Extended Release Suspension SAIDA 

([facility])

 

                                        Ergocalciferol 11304 UNT Oral Capsule Vi

tamin D2 1,250 mcg (50,000 unit) capsule

 TAKE ONE CAPSULE BY MOUTH WEEKLY       Vitamin D2 1,250 mcg (50,000 unit) capsu

le 

TAKE ONE CAPSULE BY MOUTH WEEKLY                                           compl

eted               ergocalciferol 1.25 

MG Oral Capsule                         SAIDA ([facility])

 

                          Loperamide Hydrochloride 2 MG Oral Capsule loperamide 

2 mg capsule loperamide 2 

mg capsule                                     completed             loperamide 

hydrochloride 2 MG Oral Capsule 

SAIDA ([facility])

 

                                        prednisolone acetate 10 MG/ML Ophthalmic

 Suspension prednisolone acetate 1 % eye

 drops,suspension INSTILL ONE DROP IN THE RIGHT EYE FOUR TIMES A DAY FOR 1 WEEK 
THEN TAPER AS DIRECTED                  prednisolone acetate 1 % eye drops,suspe

nsion INSTILL ONE

 DROP IN THE RIGHT EYE FOUR TIMES A DAY FOR 1 WEEK THEN TAPER AS DIRECTED       

                                          

                              completed                     prednisolone acetate

 10 MG/ML Ophthalmic Suspension SAIDA 

([facility])

 

                                        200 ACTUAT Albuterol 0.09 MG/ACTUAT Mete

red Dose Inhaler [ProAir] ProAir HFA 90 

mcg/actuation aerosol inhaler INHALE TWO PUFFS BY MOUTH EVERY 6 HOURS AS NEEDED 

ProAir HFA 90 mcg/actuation aerosol inhaler INHALE TWO PUFFS BY MOUTH EVERY 6 
HOURS AS NEEDED                                           completed             

  [id number] 200 ACTUAT albuterol 0.09 

MG/ACTUAT Metered Dose Inhaler [ProAir] SAIDA ([facility])

 

                                        Flucelvax Quad 6745-8930 60 mcg (15 mcg 

x 4)/0.5 mL intramuscular susp INJECT 

0.5ML INTRAMUSCULARLY [id number]                                     completed      

       influenza A virus 

A/[place] (H1N1) antigen 0.03 MG/ML / influenza A virus A/ 
(H3N2) antigen 0.03 MG/ML / influenza B virus B/ antigen 0.03 MG/ML 
/ influenza B virus B/[facility]- antigen 0.03 MG/ML Injectable
 Suspension [Flucelvax Quadrivalent 7112-3769] SAIDA ([facility])

 

                                        prednisolone acetate 10 MG/ML Ophthalmic

 Suspension prednisolone acetate 1 % eye

drops,suspension INSTILL ONE DROP IN THE RIGHT EYE FOUR TIMES A DAY FOR 1 WEEK 
THEN TAPER AS DIRECTED                  prednisolone acetate 1 % eye drops,suspe

nsion INSTILL ONE

DROP IN THE RIGHT EYE FOUR TIMES A DAY FOR 1 WEEK THEN TAPER AS DIRECTED        

                                          

                              completed                     prednisolone acetate

 10 MG/ML Ophthalmic Suspension SAIDA 

([facility])

 

                                        Ketorolac Tromethamine 4 MG/ML Ophthalmi

c Solution ketorolac 0.4 % eye drops 

STARTING 1 DAY PRIOR TO SURGERY PLACE 1 DROP IN THE RIGHT EYE FOUR TIMES A DAY 
THEN TAPER AS DIRECTED                  ketorolac 0.4 % eye drops STARTING 1 DAY

 PRIOR TO SURGERY

PLACE 1 DROP IN THE RIGHT EYE FOUR TIMES A DAY THEN TAPER AS DIRECTED           

                                             

             completed                              ketorolac tromethamine 4 MG/

ML Ophthalmic Solution [facility] ([facility] 

[facility])

 

                                        Flucelvax Quad 9144-6467 60 mcg (15 mcg 

x 4)/0.5 mL intramuscular susp INJECT 

0.5ML INTRAMUSCULARLY [id number]                                     completed      

       influenza A virus 

A/ (H1N1) antigen 0.03 MG/ML / influenza A virus A/ 
(H3N2) antigen 0.03 MG/ML / influenza B virus B/ antigen 0.03 MG/ML 
/ influenza B virus B/[facility]/NSXLM-/2016 antigen 0.03 MG/ML Injectable
Suspension [Flucelvax Quadrivalent 9156-8997] [facility] ([facility])

 

                                        Ciprofloxacin 3 MG/ML Ophthalmic Solutio

n ciprofloxacin 0.3 % eye drops STARTING

1 DAY PRIOR TO SURGERY PLACE 1 DROP IN THE RIGHT EYE FOUR TIMES A DAY THEN 
CONTINUE FOR 1 WEEK                     ciprofloxacin 0.3 % eye drops STARTING 1

 DAY PRIOR TO 

SURGERY PLACE 1 DROP IN THE RIGHT EYE FOUR TIMES A DAY THEN CONTINUE FOR 1 WEEK 

                                          completed               ciprofloxacin 

3 MG/ML Ophthalmic Solution [facility] ([facility] 

[facility])

 

                                        Ergocalciferol 48075 UNT Oral Capsule Vi

tamin D2 1,250 mcg (50,000 unit) capsule

TAKE ONE CAPSULE BY MOUTH WEEKLY        Vitamin D2 1,250 mcg (50,000 unit) capsu

le TAKE

ONE CAPSULE BY MOUTH WEEKLY                                           completed 

              ergocalciferol 1.25 MG 

Oral Capsule                            [facility] ([facility])

 

                                        travoprost 0.04 MG/ML Ophthalmic Solutio

n [Travatan] Travatan Z 0.004 % eye 

drops INSTILL 1 DROP IN EACH EYE IN THE EVENING Travatan Z 0.004 % eye drops 

INSTILL 1 DROP IN EACH EYE IN THE EVENING                                       

    completed               travoprost 

0.04 MG/ML Ophthalmic Solution [Travatan] [facility] ([facility])

 

                                        Ciprofloxacin 3 MG/ML Ophthalmic Solutio

n ciprofloxacin 0.3 % eye drops STARTING

 1 DAY PRIOR TO SURGERY PLACE 1 DROP IN THE RIGHT EYE FOUR TIMES A DAY THEN 
CONTINUE FOR 1 WEEK                     ciprofloxacin 0.3 % eye drops STARTING 1

 DAY PRIOR TO 

SURGERY PLACE 1 DROP IN THE RIGHT EYE FOUR TIMES A DAY THEN CONTINUE FOR 1 WEEK 

                                          completed               ciprofloxacin 

3 MG/ML Ophthalmic Solution [facility] ([facility]

 [facility])

 

                                        prednisolone acetate 10 MG/ML Ophthalmic

 Suspension prednisolone acetate 1 % eye

 drops,suspension INSTILL ONE DROP IN THE RIGHT EYE FOUR TIMES A DAY FOR 1 WEEK 
THEN TAPER AS DIRECTED                  prednisolone acetate 1 % eye drops,suspe

nsion INSTILL ONE

 DROP IN THE RIGHT EYE FOUR TIMES A DAY FOR 1 WEEK THEN TAPER AS DIRECTED       

                                          

                              completed                     prednisolone acetate

 10 MG/ML Ophthalmic Suspension [facility] 

([facility])

 

                                        200 ACTUAT Albuterol 0.09 MG/ACTUAT Mete

red Dose Inhaler [ProAir] ProAir HFA 90 

mcg/actuation aerosol inhaler INHALE TWO PUFFS BY MOUTH EVERY 6 HOURS AS NEEDED 

ProAir HFA 90 mcg/actuation aerosol inhaler INHALE TWO PUFFS BY MOUTH EVERY 6 
HOURS AS NEEDED                                           completed             

  [id number] 200 ACTUAT albuterol 0.09 

MG/ACTUAT Metered Dose Inhaler [ProAir] [facility] ([facility])

 

                                        200 ACTUAT Albuterol 0.09 MG/ACTUAT Mete

red Dose Inhaler [ProAir] ProAir HFA 90 

mcg/actuation aerosol inhaler INHALE TWO PUFFS BY MOUTH EVERY 6 HOURS AS NEEDED 

ProAir HFA 90 mcg/actuation aerosol inhaler INHALE TWO PUFFS BY MOUTH EVERY 6 
HOURS AS NEEDED                                           completed             

  [id number] 200 ACTUAT albuterol 0.09 

MG/ACTUAT Metered Dose Inhaler [ProAir] [facility] ([facility])

 

                                        Ketorolac Tromethamine 4 MG/ML Ophthalmi

c Solution ketorolac 0.4 % eye drops 

STARTING 1 DAY PRIOR TO SURGERY PLACE 1 DROP IN THE RIGHT EYE FOUR TIMES A DAY 
THEN TAPER AS DIRECTED                  ketorolac 0.4 % eye drops STARTING 1 DAY

 PRIOR TO SURGERY

 PLACE 1 DROP IN THE RIGHT EYE FOUR TIMES A DAY THEN TAPER AS DIRECTED          

                                            

                      completed                        ketorolac tromethamine 4 

MG/ML Ophthalmic Solution [facility] ([facility]

 [facility])

 

                                        Ketorolac Tromethamine 4 MG/ML Ophthalmi

c Solution ketorolac 0.4 % eye drops 

STARTING 1 DAY PRIOR TO SURGERY PLACE 1 DROP IN THE RIGHT EYE FOUR TIMES A DAY 
THEN TAPER AS DIRECTED                  ketorolac 0.4 % eye drops STARTING 1 DAY

 PRIOR TO SURGERY

 PLACE 1 DROP IN THE RIGHT EYE FOUR TIMES A DAY THEN TAPER AS DIRECTED          

                                            

                      completed                        ketorolac tromethamine 4 

MG/ML Ophthalmic Solution [facility] ([facility]

 [facility])

 

                          Loperamide Hydrochloride 2 MG Oral Capsule loperamide 

2 mg capsule loperamide 2 

mg capsule                                     completed             loperamide 

hydrochloride 2 MG Oral Capsule 

[facility] ([facility])

 

                                        prednisolone acetate 10 MG/ML Ophthalmic

 Suspension prednisolone acetate 1 % eye

 drops,suspension INSTILL ONE DROP IN THE RIGHT EYE FOUR TIMES A DAY FOR 1 WEEK 
THEN TAPER AS DIRECTED                  prednisolone acetate 1 % eye drops,suspe

nsion INSTILL ONE

 DROP IN THE RIGHT EYE FOUR TIMES A DAY FOR 1 WEEK THEN TAPER AS DIRECTED       

                                          

                              completed                     prednisolone acetate

 10 MG/ML Ophthalmic Suspension [facility] 

([facility])

 

                                        12 HR CHLORPHENIRAMINE POLISTIREX 1.6 MG

/ML / HYDROCODONE POLISTIREX 2 MG/ML 

Extended Release Suspension hydrocodone 10 mg-chlorpheniramine 8 mg/5 mL oral 
susp extend.rel 12hr TAKE 5ML BY MOUTH EVERY 12 HOURS AS NEEDED FOR COUGH 
MAXIMUM DAILY DOSE   10ML               hydrocodone 10 mg-chlorpheniramine 8 mg/

5 mL oral susp

 extend.rel 12hr TAKE 5ML BY MOUTH EVERY 12 HOURS AS NEEDED FOR COUGH MAXIMUM 
DAILY DOSE   10ML                                           completed           

    12 HR chlorpheniramine polistirex 

1.6 MG/ML / hydrocodone polistirex 2 MG/ML Extended Release Suspension [facility] 

([facility])

 

                                        Flucelvax Quad 6661-3839 60 mcg (15 mcg 

x 4)/0.5 mL intramuscular susp INJECT 

0.5ML INTRAMUSCULARLY [id number]                                     completed      

       influenza A virus 

A/ (H1N1) antigen 0.03 MG/ML / influenza A virus A/ 
(H3N2) antigen 0.03 MG/ML / influenza B virus B/ antigen 0.03 MG/ML 
/ influenza B virus B/[facility]/ antigen 0.03 MG/ML Injectable
 Suspension [Flucelvax Quadrivalent 4939-1566] [facility] ([facility])



                                                                                
                                                                                
                                                                                
                                                                                
                                                                                
                                                                                
                                                                                
                                                                                
                                                                                
                                                                                
                                                                                
                                                                                
                                                                                
                                                                                
                                                                                
                                                                                
                                                                                
                                                                                
                                                                                
                             



Insurance Providers

          



             Payer name   Policy type / Coverage type Policy ID    Covered party

 ID Covered 

party's relationship to mckeon Policy Mckeon             Plan Information

 

          [employer] RISK [employer]NAGEMENT           [id number]           WI2           

      [id number]

 

          [employer]               [id number]           WI2                 [id number]

 

          MEDICARE  A         [id number]           Self                [id number]

[id number]

 

          MEDICARE            [id number]           SP                  [id number]

[id number]

 

                [place]are [place] [id number]     

[id number] Self                                    

[id number]

 

          MEDICARE            [id number]           SP                  [id number]

A

 

                Med[place]are [place] Medicare Primary [id number]     

[id number] Self                                    

[id number]

 

                Med[place]are [place] Medicare Primary [id number]     

[id number] Self                                    

[id number]

 

                Med[place]are [place] Medicare Primary [id number]     

[id number] Self                                    

[id number]

 

          MEDICARE            [id number]           SP                  [id number]

[id number]

 

                Med[place]are [place] Medicare Primary [id number]     

[id number] Self                                    

[id number]

 

          Medicare [place] Medicare Primary           [id number]    Self            

     

 

          [employer] (pr) Medigap Part B           [id number]    Family Dependent        

    

 

          [facility]       U         [id number]           Spouse              [id number]

 

             [employer] (pr)   Medigap Part B [id number]    [id number]

.[id number] 

Family Dependent                                    [id number]

 

             [employer] (pr)   Medigap Part B [id number]    [id number]

[id number] 

Family Dependent                                    [id number]

 

             [employer] (pr)   Medigap Part B [id number]    [id number]

.[id number] 

Family Dependent                                    [id number]

 

             [employer] (pr)   Medigap Part B [id number]    [id number]

.[id number] 

Family Dependent                                    [id number]

 

             [employer] (pr)   Medigap Part B [id number]    [id number]

.[id number] 

Family Dependent                                    [id number]

 

                Medicare [place] Medicare Primary [id number]      

[id number] Self                                    

[id number]

 

             [employer] (pr)   Medigap Part B [id number]    [id number]

.[id number] 

Family Dependent                                    [id number]

 

                Medicare [place] Medicare Primary [id number]      

[id number] Self                                    

[id number]

 

             [employer] (pr)   Medigap Part B [id number]    [id number]

.[id number] 

Family Dependent                                    [id number]

 

          [facility]       U         [id number]           Spouse              [id number]

 

          SELF PAY ONLY           [id number]           SP                  [id number]

[id number]

 

                [facility] (pr)        Medigap Part B  [id number]

[id number] 

[id number] Family Dependent                        

[id number]

 

          MEDICARE  [facility]       [id number] S                   [id number]

[id number]

 

          [facility]           [id number]           SP               

   [id number]

 

          [facility]       HEA       [id number] SP                  [id number]

 

                Um[facility] (pr)        Medigap Part B  [id number]

[id number] 

[id number] Family Dependent                        

[id number]

 

          MEDICARE  [employer]       [id number] S                   [id number]

[id number]

 

          [employer] PPO O         [id number] P                   [id number]

 

          MEDICARE  C         [id number] S                   [id number]

A

 

          Medicare Part B [facility] Other     0         [id number] 

Self                0

 

          [employer] [place] Other     0         [id number] Family Dep

endent Torri Lowryhm 0

 

             Umr          Commercial   [id number]  [id number]

[id number] Family 

Dependent                                           [id number]

 

                Medicare [employer]/[employer] Medicare Primary [id number]     

[id number] Self                                    

[id number]

 

             [employer]        Medigap Part B [id number]    [id number]

.[id number] Family 

Dependent                                           [id number]

 

                Medicare [place]/[employer] Medicare Primary [id number]      

[id number] Self                                    

[id number]

 

          Medicare Part B of [facility] Other     0         [id number] S

elf                0

 

          [employer]               [id number]           WI2                 [id number]

 

          [facility]           [id number]           WI2              

   [id number]

 

          MEDICARE PART A             -O/P           [id number]           18   

               [id number]

 

          [facility]                         -O/P           [id number]           [id number]     

             [id number]

 

          [employer] [place] Other     0         [id number] Family Dep

endent Torri Ivory 0

 

          Medicare Part B [facility] Other     0         [id number] 

Self                0

 

                Um[facility] (pr)        Medigap Part B  [id number]

[id number] 

[id number] Family Dependent                        

[id number]

 

          [facility]       O         [id number] P                   [id number]

 

                Um[facility] (pr)        Medigap Part B  [id number]

[id number] 

[id number] Family Dependent                        

[id number]

 

          MEDICARE  C         [id number] S                   [id number]

[id number]

 

             Umr          Commercial   [id number]  [id number]

[id number] Family 

Dependent                                           [id number]

 

                Medicare [employer]/[employer] Medicare Primary [id number]     

[id number] Self                                    

[id number]

 

                Um[facility] (pr)        Medigap Part B  [id number]

[id number] 

[id number] Family Dependent                        

[id number]

 

          [facility]       O         [id number] S                   [id number]



                                                                                
                                                                                
                                                                                
                                                                                
                                                                                
                                                                                
                                                                                
                                                   



Problems, Conditions, and Diagnoses

          No Information                                                        
                               



Surgeries/Procedures

          



             Procedure    Description  Date         Indications  Data Source(s)

 

             OFFICE OUTPATIENT VISIT 25 MINUTES              2021 12:00:00

 AM EDT              [facility] 

([facility]

 

                    Excise Malig Lesion  .6-1CM Face/Ear/Eyelid/Nose/Lip        

             2021 12:00:00 AM 

EDT                                                 [facility] ([facility]

actice, )

 

             Shave Biopsy Of Skin, Single Lesion              2021 12:00:0

0 AM EDT              [facility] 

([facility])

 

             OFFICE OUTPATIENT VISIT 25 MINUTES              2021 12:00:00

 AM EDT              [facility] 

([facility])

 

                    History finding (finding) No significant past surgical histo

ry 2021 

12:00:00 AM EDT                                     [facility] (Gavin Munguia MD [facility])

 

                    Duplex Scan Aorta/Inf Vena Cava/Iliac Vasc/Bypass [facility]/F

ol-Up                     2021 

12:00:00 AM EDT                                     [facility] ([facility])

 

             OFFICE OUTPATIENT VISIT 15 MINUTES              2021 12:00:00

 AM EDT              [facility] 

([facility])

 

             THERAPEUTIC PX 1/> AREAS EACH 15 MIN EXERCISES              

021 12:00:00 AM EDT              

[facility] ([facility] )

 

             Physical Therapy Eval - Low Complexity              2021 12:0

0:00 AM EDT              [facility] 

([facility] )

 

             ARTHROCENTESIS ASPIR&/INJECTION MAJOR JT/BURSA              

021 12:00:00 AM EDT              

[facility] ([facility])

 

             RADEX SHOULDER COMPLETE MINIMUM 2 VIEWS              2021 12:

00:00 AM EDT              [facility] 

([facility])

 

             DESTRUCTION PREMALIGNANT LESION 1ST              2021 12:00:0

0 AM EDT              [facility] 

([facility])

 

             DESTRUCTION PREMALIGNANT LESION 2-14 EA              2021 12:

00:00 AM EDT              [facility] 

([facility])



                                                                                
                                                                                
                                              



Results

          



                    ID                  Date                Data Source

 

                    [id number]         2021 01:35:00 PM EDT [facility] ([facility], )









          Name      Value     Range     Interpretation Code Description Data Sejal

rce(s) Supporting 

Document(s)

 

           Surgical pathology study Laboratory test result                      

            [facility] ([facility])                            

 

                                        FINAL DIAGNOSIS



Left cheek, lesion, excision:

Skin with a few prominent hair follicles and actinic damage.

No malignancy is identified.

                                        2021 - 1105



CLINICAL DIAGNOSIS



SCC left cheek

                                        2021 - 1302



GROSS DIAGNOSIS



Received in formalin labeled "left cheek" is a 0.9 x 0.4 x 0.4 cm.

ellipsoid portion of skin.  It is inked, bisected and submitted all in

one.

                                        -

                                        2021 - 1302



Signed________ Bay Sierra MD 2021 1220

 









                    ID                  Date                Data Source

 

                    [id number]              2021 04:07:00 PM EDT [facility] ([facility])









          Name      Value     Range     Interpretation Code Description Data Sejal

rce(s) Supporting 

Document(s)

 

           Laboratory test finding (navigational concept) Laboratory test result

                                  

[facility] ([facility])    

 

                                        Refer Dr Jameson letter awaiting signatu

re LW 21 photo emailed, letter sent

awaiting appt lw 21 wife called inquiring on appt with Dr Jameson, I spoke 
with Devika and Dr Jameson is out for 2 weeks and Mervin is out this week.  She 
will call his wife and get him scheduled  LW 21 Patient's wife came into 
the office today and wanted referral resent to Dr Zayas and he is scheduled 
for 21 at 11:10, letter redone awaiting signature LW 21 need to cancel 
referral with Dr Jameson photo emailed, letter sent to Dr Zayas,  LW 21 

 

           Laboratory test finding (navigational concept) Laboratory test result

                                  

[facility] ([facility])    

 

                                        Refer Dr Jameson letter awaiting signatu

re LW 21 photo emailed, letter sent

awaiting appt lw 21 wife called inquiring on appt with Dr Jameson, I spoke 
with Devika and Dr Jameson is out for 2 weeks and Mervin is out this week.  She 
will call his wife and get him scheduled  LW 21 Patient's wife came into 
the office today and wanted referral resent to Dr Zayas and he is scheduled 
for 21 at 11:10, letter redone awaiting signature LW 21 need to cancel 
referral with Dr Jameson photo emailed, letter sent to Dr Zayas,  LW 21 









                    ID                  Date                Data Source

 

                    [id number]              2021 04:07:00 PM EDT [facility] ([facility])









          Name      Value     Range     Interpretation Code Description Data Sejal

rce(s) Supporting 

Document(s)

 

           Laboratory test finding (navigational concept) Laboratory test result

                                  

[facility] ([facility])    









                    ID                  Date                Data Source

 

                    [id number]            2021 05:46:00 PM EDT [facility]

 

                                        DATE OF EXAM: BDOMEN SINGLE V

IEW INDICATION: Incontinence. TECHNIQUE:

Two supine films of the abdomen were obtained. FINDINGS: Gas is seen diffusely 
throughout nondilated loops of large and small bowel.  There are no dilated 
loops of bowel.  No abnormal abdominal calcifications are present.  Prostate 
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  The lung bases
are clear.  Scoliosis and lumbar spine degenerative changes are present. 
IMPRESSION: Nonobstructive bowel gas pattern. Professional interpretation 
performed at [facility] [phone].End of diagnostic report for 
accession:  [id number] Interpreted: Denise Peter MDTranscribed: 2021
05:45 PMSigned:      2021 05:46 PM  Denise Peter MD    
-------------------------------------
-------------------------------------------MRN:  [id number] [facility] #  
[id number] [facility] # [id number] 2IRV 









          Name      Value     Range     Interpretation Code Description Data Sejal

rce(s) Supporting 

Document(s)

 

                                                                       









                    ID                  Date                Data Source

 

                    [id number] 2020 12:00:00 AM 

EDT SAIDA ([facility] 

[facility])









          Name      Value     Range     Interpretation Code Description Data Sejal

rce(s) Supporting 

Document(s)

 

                                        SARS coronavirus 2 RNA [Presence] in Res

piratory specimen by JANI with probe 

detection  negative   negative              Sars-cov-2 SAIDA ([facility]) 

 









                    ID                  Date                Data Source

 

                    [id number] 2020 12:00:00 AM 

EDT SAIDA ([facility])









          Name      Value     Range     Interpretation Code Description Data Sejal

rce(s) Supporting 

Document(s)

 

                                                                       









                    ID                  Date                Data Source

 

                    [id number] 2020 12:00:00 AM 

EDT SAIDA ([facility] 

[facility])









          Name      Value     Range     Interpretation Code Description Data Sejal

rce(s) Supporting 

Document(s)

 

                                                                       









                    ID                  Date                Data Source

 

                    [id number] 2020 12:00:00 AM 

EDT SAIDA ([facility])









          Name      Value     Range     Interpretation Code Description Data Sejal

rce(s) Supporting 

Document(s)

 

                                        SARS coronavirus 2 RNA [Presence] in Res

piratory specimen by JANI with probe 

detection  negative   negative              Sars-cov-2 SAIDA ([facility]) 

 









                    ID                  Date                Data Source

 

                    [id number] 2020 12:00:00 AM 

EDT SAIDA ([facility] 

[facility])









          Name      Value     Range     Interpretation Code Description Data Sejal

rce(s) Supporting 

Document(s)

 

                                                                       









                    ID                  Date                Data Source

 

                    [id number] 2020 12:00:00 AM 

EDT SAIDA ([facility] 

[facility])









          Name      Value     Range     Interpretation Code Description Data Sejal

rce(s) Supporting 

Document(s)

 

                                                                       









                    ID                  Date                Data Source

 

                    [id number]            2020 12:00:00 AM EDT [facility]









          Name      Value     Range     Interpretation Code Description Data Sejal

rce(s) Supporting 

Document(s)

 

          SARS-CoV-2                                         [facility]     

 

                                        This lab was ordered by [facility]-COVID19 and reported by 

[employer]. 









                    ID                  Date                Data Source

 

                    [id number] 2020 12:00:00 AM 

EDT SAIDA ([facility] 

[facility])









          Name      Value     Range     Interpretation Code Description Data Sejal

rce(s) Supporting 

Document(s)

 

                                                                       









                    ID                  Date                Data Source

 

                    [id number] 2020 12:00:00 AM 

EDT SAIDA ([facility] 

[facility])









          Name      Value     Range     Interpretation Code Description Data Sejal

rce(s) Supporting 

Document(s)

 

                                                                       









                    ID                  Date                Data Source

 

                    [id number] 2020 12:00:00 AM 

EDT SAIDA ([facility] 

[facility])









          Name      Value     Range     Interpretation Code Description Data Sejal

rce(s) Supporting 

Document(s)

 

                                                                       









                    ID                  Date                Data Source

 

                    [id number] 2020 12:00:00 AM 

EDT SAIDA ([facility] 

[facility])









          Name      Value     Range     Interpretation Code Description Data Sejal

rce(s) Supporting 

Document(s)

 

                                                                       









                    ID                  Date                Data Source

 

                    [id number]            2020 12:00:00 AM EDT [facility]









          Name      Value     Range     Interpretation Code Description Data Sejal

rce(s) Supporting 

Document(s)

 

          SARS-CoV-2                                         [facility]     

 

                                        This lab was ordered by [facility] and reported by 

[employer]. 









                    ID                  Date                Data Source

 

                    [id number] 2020 12:00:00 AM 

EDT SAIDA ([facility] 

[facility])









          Name      Value     Range     Interpretation Code Description Data Sejal

rce(s) Supporting 

Document(s)

 

                                                                       









                    ID                  Date                Data Source

 

                    [id number] 2020 12:00:00 AM 

EDT SAIDA ([facility] 

[facility])









          Name      Value     Range     Interpretation Code Description Data Sejal

rce(s) Supporting 

Document(s)

 

                                                                       









                    ID                  Date                Data Source

 

                    [id number] 2020 12:00:00 AM 

EDT SAIDA ([facility] 

[facility])









          Name      Value     Range     Interpretation Code Description Data Sejal

rce(s) Supporting 

Document(s)

 

                                                                       









                    ID                  Date                Data Source

 

                    [id number] 2020 12:00:00 AM 

EDT SAIDA ([facility] 

[facility])









          Name      Value     Range     Interpretation Code Description Data Sejal

rce(s) Supporting 

Document(s)

 

                                                                       









                    ID                  Date                Data Source

 

                    [id number] 2020 12:00:00 AM 

EDT SAIDA ([facility] 

[facility])









          Name      Value     Range     Interpretation Code Description Data Sejal

rce(s) Supporting 

Document(s)

 

                                                                       









                    ID                  Date                Data Source

 

                    [id number] 2020 12:00:00 AM 

EDT SAIDA ([facility] 

[facility])









          Name      Value     Range     Interpretation Code Description Data Sejal

rce(s) Supporting 

Document(s)

 

                                                                       









                    ID                  Date                Data Source

 

                    [id number]            2020 12:00:00 AM EDT [facility]









          Name      Value     Range     Interpretation Code Description Data Sejal

rce(s) Supporting 

Document(s)

 

          SARS-CoV-2                                         [facility]     

 

                                        This lab was ordered by [facility] and reported by 

[employer]. 







                                        Procedure

 

                                          



                                                                                
                                                                                
                                                                                
                                                                              



Social History

          No Information                                                        
                                



Vital Signs

          



                    ID                  Date                Data Source

 

                    UNK                                      









           Name       Value      Range      Interpretation Code Description Data

 Source(s)

 

           Systolic blood pressure 151 mm[Hg]                       151 mm[Hg] M

EDENT ([facility])

 

           Diastolic blood pressure 77 mm[Hg]                        77 mm[Hg]  

MEDENT ([facility])

 

           Heart rate 61 /min                          61 /min    ALEXEY ([facility])

 

           Body weight 150.00 [lb_av]                       150.00 [lb_av] MEDEN

T ([facility])

 

           Ideal body weight 172 [lb_av]                       172 [lb_av] MEDEN

T ([facility])

 

           Body weight 69.401 kg                        69.401 kg  MEDENT ([facility])

 

           Body surface area Derived from formula 1.88 m2                       

   1.88 m2    [facility] ([facility])

 

           Systolic blood pressure 138 mm[Hg]                       138 mm[Hg] M

EDENT ([facility])

 

           Diastolic blood pressure 66 mm[Hg]                        66 mm[Hg]  

MEDENT ([facility])

 

           Body temperature 98.9 [degF]                       98.9 [degF] [facility]

 ([facility])

 

           Body height 71 [in_i]                        71 [in_i]  [facility] ([facility])

 

                                        5'11" 

 

           Body weight 153.00 [lb_av]                       153.00 [lb_av] MEDEN

T ([facility])

 

           Body mass index (BMI) [Ratio] 21.3 kg/m2                       21.3 k

g/m2 [facility] ([facility])

 

           Systolic blood pressure 173 mm[Hg]                       173 mm[Hg] M

[facility] ([facility])

 

           Body mass index (BMI) [Ratio] 21.5 kg/m2                       21.5 k

g/m2 [facility] ([facility])

 

           Ideal body weight 172 [lb_av]                       172 [lb_av] MEDEN

T ([facility])

 

           Body weight 69.854 kg                        69.854 kg  [facility] ([facility])

 

           Body surface area Derived from formula 1.89 m2                       

   1.89 m2    [facility]

 

           Diastolic blood pressure 62 mm[Hg]                        62 mm[Hg]  

[facility] ([facility])

 

           Body temperature 98.4 [degF]                       98.4 [degF] [facility]

 ([facility])

 

           Body weight 154.00 [lb_av]                       154.00 [lb_av] MEDEN

T ([facility])

 

           Body height 71 [in_i]                        71 [in_i]  MEDENT ([facility])

 

                                        5'11" 

 

           Body weight 150.00 [lb_av]                       150.00 [lb_av] MEDEN

T ([facility])

 

           Respiratory rate 17 /min                          17 /min    MEDENT (

[facility])

 

           Systolic blood pressure 148 mm[Hg]                       148 mm[Hg] M

EDENT ([facility])

 

           Diastolic blood pressure 78 mm[Hg]                        78 mm[Hg]  

MEDENT ([facility]

 

           Body weight 150.00 [lb_av]                       150.00 [lb_av] MEDEN

T ([facility]

 

           Respiratory rate 17 /min                          17 /min    MEDENT (

[facility])

 

           Systolic blood pressure 150 mm[Hg]                       150 mm[Hg] M

EDENT ([facility])

 

           Diastolic blood pressure 60 mm[Hg]                        60 mm[Hg]  

MEDENT ([facility])

 

           Body temperature 96.8 [degF]                       96.8 [degF] MEDENT

 ([facility])

 

           Body height 68.5 [in_i]                       68.5 [in_i] MEDENT ([facility])

 

                                        5'8.50" 

 

           Body mass index (BMI) [Ratio] 21.6 kg/m2                       21.6 k

g/m2 MEDENT ([facility]

 

           Body temperature 97.3 [degF]                       97.3 [degF] MEDENT

 ([facility])

 

           Body weight 144.50 [lb_av]                       144.50 [lb_av] MEDEN

T ([facility] 

)

 

           Diastolic blood pressure 66 mm[Hg]                        66 mm[Hg]  

MEDENT ([facility])

 

           Body temperature 96.8 [degF]                       96.8 [degF] MEDENT

 ([facility]

 

           Systolic blood pressure 151 mm[Hg]                       151 mm[Hg] M

EDENT ([facility]

 

           Body weight 155.00 [lb_av]                       155.00 [lb_av] MEDEN

T ([facility]

 

           Diastolic blood pressure 62 mm[Hg]                        62 mm[Hg]  

SAIDA ([facility])

 

           Systolic blood pressure 148 mm[Hg]                       148 mm[Hg] A

THENA ([facility])

 

           Body height 72 [in_i]                        72 [in_i]  SAIDA ([facility] 

[facility])

 

           Body height 72 [in_i]                        72 [in_i]  SAIDA ([facility])

 

           Body height 72 [in_i]                        72 [in_i]  SAIDA ([facility])

 

           Body mass index (BMI) [Ratio] 20.9 kg/m2                       20.9 k

g/m2 SAIDA ([facility]

 

           Diastolic blood pressure 61 mm[Hg]                        61 mm[Hg]  

SAIDA ([facility])

 

           Body weight 154 [lb_av]                       154 [lb_av] SAIDA ([facility])

 

           Body mass index (BMI) [Ratio] 20.9 kg/m2                       20.9 k

g/m2 SAIDA ([facility])

 

           Systolic blood pressure 150 mm[Hg]                       150 mm[Hg] A

THENA ([facility])

 

           Diastolic blood pressure 61 mm[Hg]                        61 mm[Hg]  

SAIDA ([facility]

 

           Body height 72 [in_i]                        72 [in_i]  SAIDA ([facility])

 

           Systolic blood pressure 150 mm[Hg]                       150 mm[Hg] A

THENA ([facility])

 

           Body weight 154 [lb_av]                       154 [lb_av] SAIDA ([facility])

 

           Diastolic blood pressure 61 mm[Hg]                        61 mm[Hg]  

SAIDA ([facility]

 

           Body height 72 [in_i]                        72 [in_i]  SAIDA ([facility])

 

           Body height 72 [in_i]                        72 [in_i]  SAIDA ([facility])

 

           Systolic blood pressure 150 mm[Hg]                       150 mm[Hg] A

THENA ([facility])

 

           Body weight 154 [lb_av]                       154 [lb_av] SAIDA ([facility])

 

           Body mass index (BMI) [Ratio] 20.9 kg/m2                       20.9 k

g/m2 SAIDA ([facility]

 

           Diastolic blood pressure 61 mm[Hg]                        61 mm[Hg]  

SAIDA ([facility])

 

           Body mass index (BMI) [Ratio] 20.9 kg/m2                       20.9 k

g/m2 SAIDA ([facility])

 

           Systolic blood pressure 150 mm[Hg]                       150 mm[Hg] A

THENA ([facility])

 

           Body weight 154 [lb_av]                       154 [lb_av] SAIDA ([facility])

 

           Body height 72 [in_i]                        72 [in_i]  SAIDA ([facility])

 

           Systolic blood pressure 150 mm[Hg]                       150 mm[Hg] A

THENA ([facility]

 

           Body weight 154 [lb_av]                       154 [lb_av] SAIDA ([facility]

 

           Diastolic blood pressure 61 mm[Hg]                        61 mm[Hg]  

SAIDA ([facility]

 

           Body height 72 [in_i]                        72 [in_i]  SAIDA ([facility])

 

           Body mass index (BMI) [Ratio] 20.9 kg/m2                       20.9 k

g/m2 SAIDA ([facility]

 

           Systolic blood pressure 150 mm[Hg]                       150 mm[Hg] A

THENA ([facility])

 

           Body mass index (BMI) [Ratio] 20.9 kg/m2                       20.9 k

g/m2 SAIDA ([facility])

 

           Diastolic blood pressure 61 mm[Hg]                        61 mm[Hg]  

SAIDA ([facility]

 

           Body height 72 [in_i]                        72 [in_i]  SAIDA ([facility])

 

           Body weight 154 [lb_av]                       154 [lb_av] SAIDA ([facility])

 

           Diastolic blood pressure 61 mm[Hg]                        61 mm[Hg]  

SAIDA ([facility]

 

           Body height 72 [in_i]                        72 [in_i]  SAIDA ([facility])

 

           Body mass index (BMI) [Ratio] 20.9 kg/m2                       20.9 k

g/m2 SAIDA ([facility]

 

           Systolic blood pressure 150 mm[Hg]                       150 mm[Hg] A

THENA ([facility])

 

           Body weight 154 [lb_av]                       154 [lb_av] SAIDA ([facility])

 

           Diastolic blood pressure 61 mm[Hg]                        61 mm[Hg]  

SAIDA ([facility]

 

           Body height 72 [in_i]                        72 [in_i]  SAIDA ([facility])

 

           Body mass index (BMI) [Ratio] 20.9 kg/m2                       20.9 k

g/m2 SAIDA ([facility])

 

           Systolic blood pressure 150 mm[Hg]                       150 mm[Hg] A

THENA ([facility])

 

           Body weight 154 [lb_av]                       154 [lb_av] SAIDA ([facility])



                                                                                
                  



Patient Treatment Plan of Care

          



             Planned Activity Planned Date Details      Description  Data Source

(s)

 

                          Brimonidine tartrate 2 MG/ML / Timolol 5 MG/ML Ophthal

jesika Solution [Combigan] 

2021 12:00:00 AM EDT                                         FERN (Lino Munguia MD [facility])

 

                    travoprost 0.04 MG/ML Ophthalmic Solution [Travatan] 

021 12:00:00 AM EDT 

                                                    FERN (Gavin Munguia MD [facility])

 

                          Brimonidine tartrate 2 MG/ML / Timolol 5 MG/ML Ophthal

jesika Solution [Combigan] 

2021 12:00:00 AM EDT                                         FERN (Lino Munguia MD [facility])

 

                          Brimonidine tartrate 2 MG/ML / Timolol 5 MG/ML Ophthal

jesika Solution [Combigan] 

06/15/2021 12:00:00 AM EDT                                         FERN (Lino Munguia MD [facility])

 

                          Brimonidine tartrate 2 MG/ML / Timolol 5 MG/ML Ophthal

jesika Solution [Combigan] 

05/10/2021 12:00:00 AM EDT                                         FERN (Lino Munguia MD [facility])

 

                          Brimonidine tartrate 2 MG/ML / Timolol 5 MG/ML Ophthal

jesika Solution [Combigan] 

2020 12:00:00 AM EDT                                         FERN (Lino Munguia MD [facility])

 

                    travoprost 0.04 MG/ML Ophthalmic Solution [Travatan] 

019 12:00:00 AM EST 

                                                    FERN (Gavin Munguia MD [facility])

 

             travoprost 0.04 MG/ML Ophthalmic Solution [Travatan]               

                         SAIDA ([facility] 

[facility])

 

             200 ACTUAT Albuterol 0.09 MG/ACTUAT Metered Dose Inhaler [ProAir]  

                                      SAIDA 

([facility])

 

             prednisolone acetate 10 MG/ML Ophthalmic Suspension                

                        [facility] ([facility]

 [facility])

 

             Loperamide Hydrochloride 2 MG Oral Capsule                         

               [facility] ([facility])

 

             Ketorolac Tromethamine 4 MG/ML Ophthalmic Solution                 

                       [facility] ([facility] 

[facility])

 

                                        12 HR CHLORPHENIRAMINE POLISTIREX 1.6 MG

/ML / HYDROCODONE POLISTIREX 2 MG/ML 

Extended Release Suspension                                                 ATHE

NA ([facility])

 

             gabapentin 300 MG Oral Capsule                                     

   SA[facility] ([facility])

 

                                        Flucelvax Quad 3058-9636 60 mcg (15 mcg 

x 4)/0.5 mL intramuscular susp INJECT 

0.5ML INTRAMUSCULARLY                                                 [facility] (Pa

[facility])

 

             Ciprofloxacin 3 MG/ML Ophthalmic Solution                          

              [facility] ([facility])

 

             Cholecalciferol 94940 UNT Oral Capsule                             

           [facility] ([facility])

 

             travoprost 0.04 MG/ML Ophthalmic Solution [Travatan]               

                         [facility] ([facility] 

[facility])

 

             200 ACTUAT Albuterol 0.09 MG/ACTUAT Metered Dose Inhaler [ProAir]  

                                      SAIDA 

([facility])

 

             prednisolone acetate 10 MG/ML Ophthalmic Suspension                

                        [facility] ([facility]

 [facility])

 

             Loperamide Hydrochloride 2 MG Oral Capsule                         

               [facility] ([facility])

 

             Ketorolac Tromethamine 4 MG/ML Ophthalmic Solution                 

                       [facility] ([facility] 

[facility])

 

                                        12 HR CHLORPHENIRAMINE POLISTIREX 1.6 MG

/ML / HYDROCODONE POLISTIREX 2 MG/ML 

Extended Release Suspension                                                 ATHE

NA ([facility])

 

             gabapentin 300 MG Oral Capsule                                     

   [facility] ([facility])

 

                                        Flucelvax Quad 3681-0494 60 mcg (15 mcg 

x 4)/0.5 mL intramuscular susp INJECT 

0.5ML INTRAMUSCULARLY                                                 [facility] (Pa

[facility])

 

             Ciprofloxacin 3 MG/ML Ophthalmic Solution                          

              [facility] ([facility])

 

             Cholecalciferol 08157 UNT Oral Capsule                             

           [facility] ([facility])

 

             travoprost 0.04 MG/ML Ophthalmic Solution [Travatan]               

                         [facility] ([facility])

 

             200 ACTUAT Albuterol 0.09 MG/ACTUAT Metered Dose Inhaler [ProAir]  

                                      SAIDA 

([facility])

 

             prednisolone acetate 10 MG/ML Ophthalmic Suspension                

                        [facility] ([facility]

 [facility])

 

             Loperamide Hydrochloride 2 MG Oral Capsule                         

               [facility] ([facility])

 

             Ketorolac Tromethamine 4 MG/ML Ophthalmic Solution                 

                       [facility] ([facility] 

[facility])

 

                                        12 HR CHLORPHENIRAMINE POLISTIREX 1.6 MG

/ML / HYDROCODONE POLISTIREX 2 MG/ML 

Extended Release Suspension                                                 ATHE

NA ([facility])

 

             gabapentin 300 MG Oral Capsule                                     

   [facility] ([facility])

 

                                        Flucelvax Quad 6230-3039 60 mcg (15 mcg 

x 4)/0.5 mL intramuscular susp INJECT 

0.5ML INTRAMUSCULARLY                                                 [facility] (Pa

[facility])

 

             prednisolone acetate 10 MG/ML Ophthalmic Suspension                

                        [facility] ([facility]

 [facility])

 

             Loperamide Hydrochloride 2 MG Oral Capsule                         

               [facility] ([facility])

 

             Ketorolac Tromethamine 4 MG/ML Ophthalmic Solution                 

                       [facility] ([facility] 

[facility])

 

                                        12 HR CHLORPHENIRAMINE POLISTIREX 1.6 MG

/ML / HYDROCODONE POLISTIREX 2 MG/ML 

Extended Release Suspension                                                 ATHE

NA ([facility])

 

             gabapentin 300 MG Oral Capsule                                     

   [facility] ([facility])

 

                                        Flucelvax Quad 1479-6193 60 mcg (15 mcg 

x 4)/0.5 mL intramuscular susp INJECT 

0.5ML INTRAMUSCULARLY                                                 [facility] (Pa

[facility])

 

             Ciprofloxacin 3 MG/ML Ophthalmic Solution                          

              [facility] ([facility])

 

             Cholecalciferol 75767 UNT Oral Capsule                             

           [facility] ([facility])

 

             Ergocalciferol 99425 UNT Oral Capsule                              

          [facility] ([facility])

 

             travoprost 0.04 MG/ML Ophthalmic Solution [Travatan]               

                         [facility] ([facility])

 

             200 ACTUAT Albuterol 0.09 MG/ACTUAT Metered Dose Inhaler [ProAir]  

                                      [facility]IDA 

([facility])

 

             prednisolone acetate 10 MG/ML Ophthalmic Suspension                

                        [facility] ([facility]

 [facility])

 

             Loperamide Hydrochloride 2 MG Oral Capsule                         

               [facility] ([facility])

 

             Ketorolac Tromethamine 4 MG/ML Ophthalmic Solution                 

                       [facility] ([facility] 

[facility])

 

                                        12 HR CHLORPHENIRAMINE POLISTIREX 1.6 MG

/ML / HYDROCODONE POLISTIREX 2 MG/ML 

Extended Release Suspension                                                 ATHE

NA ([facility])

 

             gabapentin 300 MG Oral Capsule                                     

   [facility] ([facility])

 

                                        Flucelvax Quad 6502-6685 60 mcg (15 mcg 

x 4)/0.5 mL intramuscular susp INJECT 

0.5ML INTRAMUSCULARLY                                                 [facility] (Pa

[facility])

 

             Ciprofloxacin 3 MG/ML Ophthalmic Solution                          

              [facility] ([facility])

 

             Cholecalciferol 83868 UNT Oral Capsule                             

           [facility] ([facility])

 

             Ergocalciferol 48130 UNT Oral Capsule                              

          [facility] ([facility])

 

             travoprost 0.04 MG/ML Ophthalmic Solution [Travatan]               

                         [facility] ([facility])

 

             200 ACTUAT Albuterol 0.09 MG/ACTUAT Metered Dose Inhaler [ProAir]  

                                      SAIDA 

([facility])

 

             prednisolone acetate 10 MG/ML Ophthalmic Suspension                

                        [facility] ([facility]

 [facility])

 

             Loperamide Hydrochloride 2 MG Oral Capsule                         

               [facility] ([facility])

 

             Ketorolac Tromethamine 4 MG/ML Ophthalmic Solution                 

                       [facility] ([facility] 

[facility])

 

                                        12 HR CHLORPHENIRAMINE POLISTIREX 1.6 MG

/ML / HYDROCODONE POLISTIREX 2 MG/ML 

Extended Release Suspension                                                 ATHE

NA ([facility])

 

             gabapentin 300 MG Oral Capsule                                     

   [facility] ([facility])

 

                                        Flucelvax Quad 5728-3873 60 mcg (15 mcg 

x 4)/0.5 mL intramuscular susp INJECT 

0.5ML INTRAMUSCULARLY                                                 SA[facility] (Pa

in [facility])

 

             Ciprofloxacin 3 MG/ML Ophthalmic Solution                          

              [facility] ([facility])

 

             Cholecalciferol 32554 UNT Oral Capsule                             

           [facility] ([facility])

 

             Ergocalciferol 84794 UNT Oral Capsule                              

          [facility] ([facility])

 

             travoprost 0.04 MG/ML Ophthalmic Solution [Travatan]               

                         [facility])

 

             200 ACTUAT Albuterol 0.09 MG/ACTUAT Metered Dose Inhaler [ProAir]  

                                      [facility]IDA 

([facility])

 

             prednisolone acetate 10 MG/ML Ophthalmic Suspension                

                        [facility] ([facility]

 [facility])

 

             Loperamide Hydrochloride 2 MG Oral Capsule                         

               [facility] ([facility])

 

             Ketorolac Tromethamine 4 MG/ML Ophthalmic Solution                 

                       [facility] ([facility] 

[facility])

 

                                        12 HR CHLORPHENIRAMINE POLISTIREX 1.6 MG

/ML / HYDROCODONE POLISTIREX 2 MG/ML 

Extended Release Suspension                                                 ATHE

NA ([facility])

 

             gabapentin 300 MG Oral Capsule                                     

   [facility] ([facility])

 

                                        Flucelvax Quad 0902-2633 60 mcg (15 mcg 

x 4)/0.5 mL intramuscular susp INJECT 

0.5ML INTRAMUSCULARLY                                                 [facility] ([facility]

[facility])

 

             Ciprofloxacin 3 MG/ML Ophthalmic Solution                          

              [facility] ([facility])

 

             Cholecalciferol 15738 UNT Oral Capsule                             

           [facility] ([facility])

 

             Ciprofloxacin 3 MG/ML Ophthalmic Solution                          

              [facility] ([facility])

 

             Cholecalciferol 84536 UNT Oral Capsule                             

           [facility] ([facility])

 

             Ergocalciferol 46865 UNT Oral Capsule                              

          [facility] ([facility])

 

             travoprost 0.04 MG/ML Ophthalmic Solution [Travatan]               

                         [facility] ([facility])

 

             200 ACTUAT Albuterol 0.09 MG/ACTUAT Metered Dose Inhaler [ProAir]  

                                      [facility]IDA 

([facility])

 

             prednisolone acetate 10 MG/ML Ophthalmic Suspension                

                        [facility] ([facility]

 [facility])

 

             Loperamide Hydrochloride 2 MG Oral Capsule                         

               [facility] ([facility])

 

             Ketorolac Tromethamine 4 MG/ML Ophthalmic Solution                 

                       [facility] ([facility] 

[facility])

 

                                        12 HR CHLORPHENIRAMINE POLISTIREX 1.6 MG

/ML / HYDROCODONE POLISTIREX 2 MG/ML 

Extended Release Suspension                                                 ATHE

NA ([facility])

 

             gabapentin 300 MG Oral Capsule                                     

   [facility] ([facility])

 

                                        Flucelvax Quad 5341-3674 60 mcg (15 mcg 

x 4)/0.5 mL intramuscular susp INJECT 

0.5ML INTRAMUSCULARLY                                                 [facility] ([facility]

[facility])

 

             Ciprofloxacin 3 MG/ML Ophthalmic Solution                          

              [facility] ([facility])

 

             Cholecalciferol 43940 UNT Oral Capsule                             

           [facility] ([facility])

 

             Ergocalciferol 04628 UNT Oral Capsule                              

          [facility] ([facility])

 

             travoprost 0.04 MG/ML Ophthalmic Solution [Travatan]               

                         [facility] ([facility])

 

             200 ACTUAT Albuterol 0.09 MG/ACTUAT Metered Dose Inhaler [ProAir]  

                                      SAIDA 

([facility])

## 2021-10-24 NOTE — CCD
Continuity of Care Document (CCD)

                             Created on: 2021



Preston Ivory

External Reference #: [id number]

: 1935

Sex: Male



Demographics





                          Address                   [address] [place] [address]  [address]

 

                          Home Phone                [phone]

 

                          Preferred Language        Unknown

 

                          Marital Status            Unknown

 

                          Lutheran Affiliation     Unknown

 

                          Race                      White

 

                          Ethnic Group              Not  or 





Author





                          Author                    Preston WILCOX [facility]

 

                          Organization              Unknown

 

                          Address                   [address]

[place]  [address]



 

                          Phone                     [phone]







Care Team Providers





                    Care Team Member Name Role                Phone

 

                    Sadaf Solano N.P. AUTM                [phone]

 

                    Kei Smith M.D.   AUTM                [phone]







Problems





                    Active Problems     Provider            Date

 

                    Essential hypertension                     Onset: 2014

 

                          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

 Of Face Bhavik Ceron M.D.                                    Onset: 2014

 

                    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 Onset: 

2014

 

                    Screening for malignant neoplasm of colon DOROTHEA Kelly Onset: 2018

 

                    History of polyp of colon JEREL Kelly Onset: 

018







Social History





                Type            Date            Description     Comments

 

                Birth Sex                       Unknown          

 

                ETOH Use                        consumes 6 beers per week  

 

                Tobacco Use     Start: Unknown  Patient is a current smoker, smo

kes every day 1/2 PPD

FOR 22 YEARS 

 

                Recreational Drug Use                 Denies Drug Use  







Allergies, Adverse Reactions, Alerts





             Active Allergies Reaction     Severity     Comments     Date

 

             Sulfa        LIGHT HEADED                           2014







Medications





           Active Medications SIG        Qnty       Indications Ordering Provide

r Date

 

                          Glipizide ER                     2.5mg Tablets ER 24HR

                   1 PO 

Daily                                           Unknown         

 

                                        Amlodipine Besylate/Benazepril Hydrochlo

ride                     5-10mg Capsules

                  1 po at night                           Unknown      

0

 

                    Lipitor                     20mg Tablets                   1

 PO Every Other Day 

                                        Unknown             

 

             Atenolol                     25mg Tablets                   2 PO da

lópez                             

Unknown                                 

 

                    Tamsulosin HCL                     0.4mg Capsules           

        1 po qd             

90caps                                  Unknown             

 

                          Combigan                     0.2-0.5% Solution        

           1 drop ea eye 

bid                                             Unknown         

 

                          Travatan                     0.004% Solution          

         1 gtts Each Eye 

qd                                              Unknown         

 

             Procrit                      Solution                   1 Shot Q3-4

 Weeks                           

Unknown                                 

 

                          Vitamin D                     [id number] Capsules      

             1 po q weekly

                4caps                           Unknown         

 

           Aspirin                     81mg Tablets                   1 po qd   

                       Unknown    



 

             Cinnamon                     500mg Tablets                   2 PO D

aily                             

Unknown                                 

 

                          Gabapentin                     300mg Capsules         

          1 tab by mouth 

twice a day     45caps                          Unknown         

 

                          Creon                     [id number] Caps DR Peter staples                   2 

capsules  a day with meals                                 Unknown         







Immunizations





                                        Description

 

                                        No Information Available







Vital Signs





                Date            Vital           Result          Comment

 

                2021  1:33pm BP Systolic     138 mmHg         

 

                    BP Diastolic        66 mmHg              

 

                    Body Temperature    98.9 F             

 

                    Height              71 inches           5'11"

 

                    Weight              153.00 lb            

 

                    BMI (Body Mass Index) 21.3 kg/m2           

 

                    Ideal Body Weight   172 lb               

 

                    Weight              69.401 kg            

 

                    BSA (Body Surface Area) 1.88 m2              

 

                2021  1:01pm BP Systolic     173 mmHg         

 

                    BP Diastolic        62 mmHg              

 

                    Body Temperature    98.4 F             

 

                    Height              71 inches           5'11"

 

                    Weight              154.00 lb            

 

                    BMI (Body Mass Index) 21.5 kg/m2           

 

                    Ideal Body Weight   172 lb               

 

                    Weight              69.854 kg            

 

                    BSA (Body Surface Area) 1.89 m2              







Results





        Test    Acquired Date Facility Test    Result  H/L     Range   Note

 

                    Laboratory test finding 2021          [facility]

                                        [address] [place] [address]

           [phone] Pathology Request For Service (SEE NOTE)              

          1







                          1                         FINAL DIAGNOSIS



Left cheek, lesion, excision:

Skin with a few prominent hair follicles and actinic damage.

No malignancy is identified.

                                        2021 - 1105



CLINICAL DIAGNOSIS



SCC left cheek

                                        2021 - 1302



GROSS DIAGNOSIS



Received in formalin labeled "left cheek" is a 0.9 x 0.4 x 0.4 cm.

ellipsoid portion of skin.  It is inked, bisected and submitted all in

one.

                                        Jasson

                                        2021 - 1302



Signed________ Bay Sierra MD 2021 1220









Procedures





                Date            Code            Description     Status

 

                2021      80713           Excise Malig Lesion  .6-1CM Face

/Ear/Eyelid/Nose/Lip Completed







[facility]





                                        Description

 

                                        No Information Available







Encounters





           Type       Date       Location   Provider   Dx         Diagnosis

 

             Office Visit 2021  1:30p [facility] ANNIE Villegas 

C44.329                                 Squamous cell carcinoma of skin of other

 parts of face

 

                          Z48.3                     Aftercare following surgery 

for neoplasm







Assessments





                Date            Code            Description     Provider

 

                2021      C44.329         Squamous cell carcinoma of skin 

of other parts of face ANNIE Kam

 

                2021      Z48.3           Aftercare following surgery for 

neoplasm ANNIE Kam

 

                2021      C44.329         Squamous cell carcinoma of skin 

of other parts of face 

Tera Zayas MD







Plan of Treatment





No Information Available



Functional Status





                                        Description

 

                                        No Information Available







Mental Status





                                        Description

 

                                        No Information Available







Referrals





                Refer to Dr     Reason for Referral Status          Appt Date

 

                Tera Zayas MD [facility], RT CHECK   Scheduled       2021

 

                                        [address] [place] [address]

 

                                        [address] [place] [address]

 

                                        [phone]

## 2021-10-24 NOTE — CCD
Continuity of Care Document (CCD)

                             Created on: 2021



Dianelys Atul MAHARAJ

External Reference #: [id number]

: 1935

Sex: Male



Demographics





                          Address                   [address] [place] [address] 

[place]  [address]

 

                          Home Phone                [phone]

 

                          Preferred Language        Unknown

 

                          Marital Status            

 

                          Methodist Affiliation     Unknown

 

                          Race                      White

 

                          Ethnic Group              Declined to Specify/Unknown





Author





                          Author                    Atul ARREDONDO

 

                          Organization              Unknown

 

                          Address                   [address] [place] [address]  [address]



 

                          Phone                     [phone]







Care Team Providers





                    Care Team Member Name Role                Phone

 

                    Kei Smith M.D.   AUTM                [phone]







Problems





                    Active Problems     Provider            Date

 

                    Abdominal aortic aneurysm without rupture                   

  Onset: 2011







Social History





                Type            Date            Description     Comments

 

                Birth Sex                       Unknown          

 

                ETOH Use                        consumes 5 beers per week  

 

                Tobacco Use     Reviewed: 19 Patient is a current smoker, 

smokes some days 

states he smokes occasionally  

 

                Smoking Status  Reviewed: 19 Patient is a current smoker, 

smokes some days 

states he smokes occasionally  







Allergies and adverse reactions





             Active Allergies Criticality  Reaction | Severity Comments     Date

 

             Sulfa        Unable to assess criticality lightheaded              

 2010







Medications





           Active Medications SIG        Qnty       Indications Ordering Provide

r Date

 

             Atenolol                     25mg Tablets                   2 po qd

      30tabs                    

Unknown                                 

 

           Lipitor                     20mg Tablets                   1 qod     

                       Unknown    



 

           Flomax                     0.4mg Caps ER 24HR                   qd   

                            Unknown    



 

                                        Amlodipine Besy-Benazepril HCL          

           5-20mg Capsules              

             qd                                     Unknown      

 

                    Travatan Z                     0.004% Solution              

     1 gtt ou at hs      

                                        Unknown             

 

           Aspirin                     81mg Tablets                   qd        

                       Unknown    



 

           Multivitamins                      Tablets                   qd      

                         Unknown    



 

           Iron                     65 Tablets                   qd             

                  Unknown    

 

             Procrit                     3000Unit/ML Solution                   

q 4weeks                               

Unknown                                 

 

             Combigan                     0.2-0.5% Solution                   ou

 bid                                 

Unknown                                 

 

           Glipizide ER                     2.5mg                    qd         

                      Unknown    



 

           Vitamin B-12                     2500mg                    qd        

                       Unknown    



 

           Vitamin D2                      Tablets                   q week     

                      Unknown    



 

                          Cinnamon                     500mg Capsules           

        2 by mouth every 

day                                             Unknown         

 

           Gabapentin                     300mg Capsules                   bid  

                            Unknown    









Immunizations





                                        Description

 

                                        No Information Available







Vital Signs





                Date            Vital           Result          Comment

 

                2021 10:38am BP Systolic Right Arm 150 mmHg         

 

                    BP Diastolic Right Arm 60 mmHg              

 

                    Body Temperature    96.8 F             

 

                2019 11:09am BP Systolic Left Arm 140 mmHg         

 

                    BP Diastolic Left Arm 60 mmHg              

 

                    Height              72 inches           6'0"

 

                    Weight              155.00 lb            

 

                    Weight              70.308 kg            

 

                    BMI (Body Mass Index) 21.0 kg/m2           







Results





                                        Description

 

                                        No Information Available







Procedures





                Date            Code            Description     Status

 

                2021      14061           Office/Outpatient Established Lo

w MDM 20-29 Min Completed

 

                    2021          24593               Duplex Scan Aorta/In

f Vena Cava/Iliac Vasc/Bypass GRFT 

LTD/Fol-Up                              Completed







[employer]





                                        Description

 

                                        No Information Available







Encounters





           Type       Date       Location   Provider   Dx         Diagnosis

 

           Office Visit 2021 11:00a Main Office ADALID Baptiste Z48.812 

   Encntr for 

surgical aftcr following surgery on the circ sys

 

                          I71.4                     Abdominal aortic aneurysm, w

ithout rupture







Assessments





                Date            Code            Description     Provider

 

                2021      I71.4           Abdominal aortic aneurysm, witho

ut rupture Reynold Chase M.D.

 

                    2021          Z48.812             Encounter for surgic

al aftercare following surgery on the 

circulatory system                      ADALID Baptiste

 

                    2021          Z48.812             Encounter for surgic

al aftercare following surgery on the 

circulatory system                      Reynold Chase M.D.

 

                2021      I71.4           Abdominal aortic aneurysm, witho

ut rupture [facility]

 

                2021      I71.4           Abdominal aortic aneurysm, witho

ut rupture ADALID Baptiste

 

                    2021          Z48.812             Encounter for surgic

al aftercare following surgery on the 

circulatory system                      [facility]







Plan of Treatment

Future Appointment(s):* 2022  1:00 pm - [facility] at Main Office





Functional Status





                                        Description

 

                                        No Information Available







Mental Status





                                        Description

 

                                        No Information Available







Referrals





                                        Description

 

                                        No Information Available

## 2021-10-24 NOTE — CCD
Continuity of Care Document (CCD)

                             Created on: 08/10/2021



Preston Ivory

External Reference #: [id number]

: 1935

Sex: Male



Demographics





                          Address                   [address] [place] [address]  [address]

 

                          Home Phone                [phone]

 

                          Preferred Language        Unknown

 

                          Marital Status            Unknown

 

                          Mormon Affiliation     Unknown

 

                          Race                      White

 

                          Ethnic Group              Not  or 





Author





                          Author                    Preston ZAYAS MD

 

                          Organization              Unknown

 

                          Address                   [address] [place] [address]  [id number]



 

                          Phone                     [phone]







Care Team Providers





                    Care Team Member Name Role                Phone

 

                    Sadaf Solano N.P. AUTM                [phone]

 

                    Kei Smith M.D.   AUTM                [phone]







Problems





                    Active Problems     Provider            Date

 

                    Essential hypertension                     Onset: 2014

 

                          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

 Of Face Bhavik Ceron M.D.                                    Onset: 2014

 

                    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 Onset: 

2014

 

                    Screening for malignant neoplasm of colon DOROTHEA Kelly Onset: 2018

 

                    History of polyp of colon JEREL Kelly Onset: 

018







Social History





                Type            Date            Description     Comments

 

                Birth Sex                       Unknown          

 

                ETOH Use                        consumes 6 beers per week  

 

                Tobacco Use     Start: Unknown  Patient is a current smoker, smo

kes every day 1/2 PPD

FOR 22 YEARS 

 

                Recreational Drug Use                 Denies Drug Use  







Allergies, Adverse Reactions, Alerts





             Active Allergies Reaction     Severity     Comments     Date

 

             Sulfa        LIGHT HEADED                           2014







Medications





           Active Medications SIG        Qnty       Indications Ordering Provide

r Date

 

                          Glipizide ER                     2.5mg Tablets ER 24HR

                   1 PO 

Daily                                           Unknown         

 

                                        Amlodipine Besylate/Benazepril Hydrochlo

ride                     5-10mg Capsules

                  1 po at night                           Unknown      

0

 

                    Lipitor                     20mg Tablets                   1

 PO Every Other Day 

                                        Unknown             

 

             Atenolol                     25mg Tablets                   2 PO da

lópez                             

Unknown                                 

 

                    Tamsulosin HCL                     0.4mg Capsules           

        1 po qd             

90caps                                  Unknown             

 

                          Combigan                     0.2-0.5% Solution        

           1 drop ea eye 

bid                                             Unknown         

 

                          Travatan                     0.004% Solution          

         1 gtts Each Eye 

qd                                              Unknown         

 

             Procrit                      Solution                   1 Shot Q3-4

 Weeks                           

Unknown                                 

 

                          Vitamin D                     [id number] Capsules      

             1 po q weekly

                4caps                           Unknown         

 

           Aspirin                     81mg Tablets                   1 po qd   

                       Unknown    



 

             Cinnamon                     500mg Tablets                   2 PO D

aily                             

Unknown                                 

 

                          Gabapentin                     300mg Capsules         

          1 tab by mouth 

twice a day     45caps                          Unknown         

 

                          Creon                     [id number] Caps DR Peter staples                   2 

capsules  a day with meals                                 Unknown         







Immunizations





                                        Description

 

                                        No Information Available







Vital Signs





                Date            Vital           Result          Comment

 

                2021  1:01pm BP Systolic     173 mmHg         

 

                    BP Diastolic        62 mmHg              

 

                    Body Temperature    98.4 F             

 

                    Height              71 inches           5'11"

 

                    Weight              154.00 lb            

 

                    BMI (Body Mass Index) 21.5 kg/m2           

 

                    Ideal Body Weight   172 lb               

 

                    Weight              69.854 kg            

 

                    BSA (Body Surface Area) 1.89 m2              

 

                2018  3:34pm BP Systolic     164 mmHg         

 

                    BP Diastolic        73 mmHg              

 

                    Height              71 inches           5'11"

 

                    Weight              153.38 lb            

 

                    BMI (Body Mass Index) 21.4 kg/m2           

 

                    Ideal Body Weight   172 lb               

 

                    Weight              69.571 kg            

 

                    BSA (Body Surface Area) 1.88 m2              







Results





        Test    Acquired Date Facility Test    Result  H/L     Range   Note

 

                    Laboratory test finding 2021          [facility]

                                        [address] [place] [address]

           [phone] Pathology Request For Service (SEE NOTE)              

          1







                          1                         FINAL DIAGNOSIS



Left cheek, lesion, excision:

Skin with a few prominent hair follicles and actinic damage.

No malignancy is identified.

                                        2021 - 1105



CLINICAL DIAGNOSIS



[facility] left cheek

                                        2021 - 1302



GROSS DIAGNOSIS



Received in formalin labeled "left cheek" is a 0.9 x 0.4 x 0.4 cm.

ellipsoid portion of skin.  It is inked, bisected and submitted all in

one.

                                        Jasson

                                        2021 - 1302



Signed________ Bay Sierra MD 2021 [id number]









Procedures





                Date            Code            Description     Status

 

                2021      37740           Excise Malig Lesion  .6-1CM Face

/Ear/Eyelid/Nose/Lip Completed







Medical Devices





                                        Description

 

                                        No Information Available







Encounters





                                        Description

 

                                        No Information Available







Assessments





                Date            Code            Description     Provider

 

                2021      C44.329         Squamous cell carcinoma of skin 

of other parts of face 

Tera Zayas MD







Plan of Treatment

Future Appointment(s):* 2021  1:30 pm - ANNIE Kam at [facility]

2021 - Tera Zayas MD* C44.329 Squamous cell carcinoma of skin of 
  other parts of face* Comments:* It was hard to delineate the margins of the 
  scar.  I do not see any leftover of the lesion that was biopsied though there 
  is a closer area of skin redness and thickening that would be involved at the 
  bottom portion of the excision site which may return as some either reactive 
  changes to her even possibly a focus of skin cancer.Patient consented for 
  excision of the site.  The excised portions 8 mm x 8 mm x 2 mm depth and path 
  shows this was positive on the bottom part of the site.He was on the right 
  lateral decubitus position.  Area of the scar prepped with Betadine.  This was
  infiltrated with 1% lidocaine containing epinephrine.  A cruciate skin 
  incision was then created past the margins of the scar and deepened through to
  the superficial subcutaneous tissue and removed.  As mentioned may be portion 
  on the bottom part of the specimen that may have something in them. Discussed 
  postop wound care:     a. keep incision dry x 24 hours.     b. May remove 
  dressings after 24 hrs. May get area wet. pat incision dry. may replace with 
  light cover prn for comfort.. Follow up for suture removal in one week









Functional Status





                                        Description

 

                                        No Information Available







Mental Status





                                        Description

 

                                        No Information Available







Referrals





                Refer to      Reason for Referral Status          Appt Date

 

                Tera Zayas MD [facility], RT CHECK   Scheduled       2021

 

                                        [address] [place] [id number] [phone]

## 2021-10-24 NOTE — REP
INDICATION:

RLE pain r/o dvt.



COMPARISON:

None.



TECHNIQUE:

Duplex Doppler and color ultrasound evaluation of the right lower extremity venous

system.



FINDINGS:

There is no evidence of deep venous thrombosis of the right lower extremity.



IMPRESSION:

Normal right lower extremity deep venous system.





<Electronically signed by Anup Mcfarland > 10/24/21 [id number]

## 2021-10-24 NOTE — CCD
Summarization Of Episode

                             Created on: 10/24/2021



LUMA IVORY

External Reference #: [id number]

: 1935

Sex: Undifferentiated



Demographics





                          Address                   [address] [place] [address]

 

                          Home Phone                [phone]

 

                          Preferred Language        English

 

                          Marital Status            Unknown

 

                          Hindu Affiliation     Unknown

 

                          Race                      Unknown

 

                          Ethnic Group              Not  or 





Author





                          Author                    [facility]

 

                          Organization              [facility]

 

                          Address                   Unknown

 

                          Phone                     Unavailable







Support





                Name            Relationship    Address         Phone

 

                TORRI IVORY Next Of Kin     Unknown         Unavailable

 

                MCKENNA IVORY   Next Of Kin     Unknown         [phone]

 

                    TORRI IVORY     Next Of Kin         [address] 

[place] [address]                    [phone]

 

                    SELF EMPLOYED       Next Of Kin         [address] [place] [address] 

[place] [address]                    [phone]

 

                RE              Next Of Kin     Unknown         Unavailable

 

                    ADRIAN IVORY    Next Of Kin         [address] [place] [address]                    [phone]

 

                    ADRIAN Ivorylian    ECON                [address] [place] [address]                    Unavailable







Care Team Providers





                    Care Team Member Name Role                Phone

 

                    MUNIRA Varela MD Unavailable         Unavailable

 

                    MUNIRA Varela MD Unavailable         Unavailable

 

                    MUNIRA Varela MD Unavailable         Unavailable

 

                    MUNIRA Varela MD Unavailable         Unavailable

 

                    MUNIRA Varela MD Unavailable         Unavailable

 

                    MUNIRA Varela MD Unavailable         Unavailable

 

                    MUNIRA Varela MD Unavailable         Unavailable

 

                    MUNIRA Varela MD Unavailable         Unavailable

 

                    MUNIRA Varela MD Unavailable         Unavailable

 

                    MUNIRA Varela MD Unavailable         Unavailable

 

                    MUNIRA Varela MD Unavailable         Unavailable

 

                    JUAN Wheatley MD Unavailable         Unavailable

 

                    JUAN Wheatley MD Unavailable         Unavailable

 

                    JUAN Wheatley MD Unavailable         Unavailable

 

                    JUAN Wheatley MD Unavailable         Unavailable

 

                    JUAN Wheatley MD Unavailable         Unavailable

 

                    JUAN Wheatley MD Unavailable         Unavailable

 

                    JUAN Wheatley MD Unavailable         Unavailable

 

                    JUAN Wheatley MD Unavailable         Unavailable

 

                    JUAN Wheatley MD Unavailable         Unavailable

 

                    JUAN Wheatley MD Unavailable         Unavailable

 

                    JUAN Wheatley MD Unavailable         Unavailable

 

                    JUAN Wheatley MD Unavailable         Unavailable

 

                    JUAN Wheatley MD Unavailable         Unavailable

 

                    JUAN Wheatley MD Unavailable         Unavailable

 

                    JUAN Wheatley MD Unavailable         Unavailable

 

                    JUAN Wheatley MD Unavailable         Unavailable

 

                    JUAN Wheatley MD Unavailable         Unavailable

 

                    BolJUAN rodríguez MD Unavailable         Unavailable

 

                    BolJUAN rodríguez MD Unavailable         Unavailable

 

                    BolJUAN rodríguez MD Unavailable         Unavailable

 

                    BollaJUAN MD Unavailable         Unavailable

 

                    BolJUAN rodríguez MD Unavailable         Unavailable

 

                    BolJUAN rodríguez MD Unavailable         Unavailable

 

                    Bolla, JUAN Ramos MD Unavailable         Unavailable

 

                    BolJUAN rodríguez MD Unavailable         Unavailable

 

                    Bolla, JUAN Ramos MD Unavailable         Unavailable

 

                    BolJUAN rodríguez MD Unavailable         Unavailable

 

                    Bolla, JUAN Ramos MD Unavailable         Unavailable

 

                    BolJUAN rodríguez MD Unavailable         Unavailable

 

                    BolJUAN rodríguez MD Unavailable         Unavailable

 

                    BolJUAN rodríguez MD Unavailable         Unavailable

 

                    Bolla, JUAN Ramos MD Unavailable         Unavailable

 

                    Bolla, JUAN Ramos MD Unavailable         Unavailable

 

                    Bolla, JUAN Ramos MD Unavailable         Unavailable

 

                    Bolla, JUAN Ramos MD Unavailable         Unavailable

 

                    Bolla, JUAN Ramos MD Unavailable         Unavailable

 

                    BolJUAN rodríguez MD Unavailable         Unavailable

 

                    Bolla, JUAN Ramos MD Unavailable         Unavailable

 

                    BolJUAN rodríguez MD Unavailable         Unavailable

 

                    BolJUAN rodríguez MD Unavailable         Unavailable

 

                    BolJUAN rodríguez MD Unavailable         Unavailable

 

                    BolJUAN rodríguez MD Unavailable         Unavailable

 

                    BolJUAN rodríguez MD Unavailable         Unavailable

 

                    BolJUAN rodríguez MD Unavailable         Unavailable

 

                    BolJUAN rodríguez MD Unavailable         Unavailable

 

                    BolJUAN rodríguez MD Unavailable         Unavailable

 

                    BolJUAN rodríguez MD Unavailable         Unavailable

 

                    BolJUAN rodríguez MD Unavailable         Unavailable

 

                    BolJUAN rodríguez MD Unavailable         Unavailable

 

                    [place]ines[place], Brea FNP Unavailable         Unavailable

 

                    [place], Brea FNP Unavailable         Unavailable

 

                    [place], Brea FNP Unavailable         Unavailable

 

                    [place], Brea FNP Unavailable         Unavailable

 

                    [place], Brea FNP Unavailable         Unavailable

 

                    [place], Brea FNP Unavailable         Unavailable

 

                    [place], Brea FNP Unavailable         Unavailable

 

                    [place], Brea FNP Unavailable         Unavailable

 

                    [place], Brea FNP Unavailable         Unavailable

 

                    [place], Brea FNP Unavailable         Unavailable

 

                    [place], Brea FNP Unavailable         Unavailable

 

                    [place], Brea FNP Unavailable         Unavailable

 

                    [place], Brea FNP Unavailable         Unavailable

 

                    [place], Brea FNP Unavailable         Unavailable

 

                    [place], Brea FNP Unavailable         Unavailable

 

                    [place], Brea FNP Unavailable         Unavailable

 

                    [place], Brea FNP Unavailable         Unavailable

 

                    [place], Brea FNP Unavailable         Unavailable

 

                    [place], Brea FNP Unavailable         Unavailable

 

                    [place], Brea FNP Unavailable         Unavailable

 

                    [place], Brea FNP Unavailable         Unavailable

 

                    [place], Brea FNP Unavailable         Unavailable

 

                    [place], Brea FNP Unavailable         Unavailable

 

                    [place], Brea FNP Unavailable         Unavailable

 

                    [place], Brea FNP Unavailable         Unavailable

 

                    [place], Brea FNP Unavailable         Unavailable

 

                    [place], Brea FNP Unavailable         Unavailable

 

                    [place], Brea FNP Unavailable         Unavailable

 

                    [place], Brea FNP Unavailable         Unavailable

 

                    [place], Brea FNP Unavailable         Unavailable

 

                    [place], Brea FNP Unavailable         Unavailable

 

                    [place], Brea FNP Unavailable         Unavailable

 

                    [place], Brea FNP Unavailable         Unavailable

 

                    [place], Brea FNP Unavailable         Unavailable

 

                    [place], Brea FNP Unavailable         Unavailable

 

                    [place], Brea FNP Unavailable         Unavailable

 

                    Jumalon, M Page FNP Unavailable         Unavailable

 

                    Jumalon, M Page FNP Unavailable         Unavailable

 

                    Jumalon, M Page FNP Unavailable         Unavailable

 

                    Jumalon, M Page FNP Unavailable         Unavailable

 

                    Jumalon, M Page FNP Unavailable         Unavailable

 

                    Jumalon, M Page FNP Unavailable         Unavailable

 

                    Jumalon, M Page FNP Unavailable         Unavailable

 

                    Jumalon, M Page FNP Unavailable         Unavailable

 

                    Jumalon, M Page FNP Unavailable         Unavailable

 

                    Jumalon, M Page FNP Unavailable         Unavailable

 

                    Jumalon, M Page FNP Unavailable         Unavailable

 

                    Jumalon, M Page FNP Unavailable         Unavailable

 

                    Jumalon, M Page FNP Unavailable         Unavailable

 

                    Jumalon, M Page FNP Unavailable         Unavailable

 

                    Jumalon, M Page FNP Unavailable         Unavailable

 

                    Jumalon, M Page FNP Unavailable         Unavailable

 

                    Jumalon, M Page FNP Unavailable         Unavailable

 

                    Jumalon, M Page FNP Unavailable         Unavailable

 

                    Jumalon, M Page FNP Unavailable         Unavailable

 

                    Jumalon, M Page FNP Unavailable         Unavailable

 

                    Jumalon, M Page FNP Unavailable         Unavailable

 

                    Jumalon, M Page FNP Unavailable         Unavailable

 

                    Jumalon, M Page FNP Unavailable         Unavailable

 

                    Jumalon, M Page FNP Unavailable         Unavailable

 

                    Jumalon, M Page FNP Unavailable         Unavailable

 

                    Jumalon, M Page FNP Unavailable         Unavailable

 

                    Jumalon, M Page FNP Unavailable         Unavailable

 

                    Jumalon, M Page FNP Unavailable         Unavailable

 

                    Jumalon, M Page FNP Unavailable         Unavailable

 

                    Jumalon, M Page FNP Unavailable         Unavailable

 

                    Rbooks Munguia, MELODY Alonso MD, FACS Unavailable         Unavailable

 

                    Brooks Munguia, MELODY Alonso MD, FACS Unavailable         Unavailable

 

                    Brooks Munguia, MELODY Alonso MD, FACS Unavailable         Unavailable

 

                    Brooks Munguia, MELODY Alonso MD, FACS Unavailable         Unavailable

 

                    Brooks Munguia, MELODY Alonso MD, FACS Unavailable         Unavailable

 

                    Brooks Munguia, MELODY Alonso MD, FACS Unavailable         Unavailable

 

                    Brooks Munguia, MELODY Alonso MD, FACS Unavailable         Unavailable

 

                    Brooks Munguia, MELODY Alonso MD, FACS Unavailable         Unavailable

 

                    Brooks Munguia, MELODY Alonso MD, FACS Unavailable         Unavailable

 

                    Brooks Munguia, MELODY Alonso MD, FACS Unavailable         Unavailable

 

                    Brooks Munguia, MELODY Alonso MD, FACS Unavailable         Unavailable

 

                    Brooks Munguia, MELODY Alonso MD, FACS Unavailable         Unavailable

 

                    Brooks Munguia, MELODY Alonso MD, FACS Unavailable         Unavailable

 

                    Brooks Munguia, MELODY Alonso MD, FACS Unavailable         Unavailable

 

                    Brooks Munguia, MELODY Alonso MD, FACS Unavailable         Unavailable

 

                    Brooks Munguia, MELODY Alonso MD, FACS Unavailable         Unavailable

 

                    Brooks Munguia, MELODY Alonso MD, FACS Unavailable         Unavailable

 

                    Brooks Munguia, MELODY Alonso MD, FACS Unavailable         Unavailable

 

                    Brooks Munguia, MELODY Alonso MD, FACS Unavailable         Unavailable

 

                    Brooks Munguia, MELODY Alonso MD, FACS Unavailable         Unavailable

 

                    Brooks Munguia, MELODY Alonso MD, FACS Unavailable         Unavailable

 

                    Brooks Munguia, MELODY Alonso MD, FACS Unavailable         Unavailable

 

                    Brooks Munguia, MELODY Alonso MD, FACS Unavailable         Unavailable

 

                    Brooks Munguia, MELODY Alonso MD, FACS Unavailable         Unavailable

 

                    Brooks Munguia, MELODY Alonso MD, FACS Unavailable         Unavailable

 

                    Brooks Munguia, MELODY Alonso MD, FACS Unavailable         Unavailable

 

                    Brooks Munguia, MELODY Alonso MD, FACS Unavailable         Unavailable

 

                    Brooks Munguia, MELODY Alonso MD, FACS Unavailable         Unavailable

 

                    Brooks Munguia, MELODY Alonso MD, FACS Unavailable         Unavailable

 

                    Brooks Munguia, MELODY Alonso MD, FACS Unavailable         Unavailable

 

                    Brooks Munguia, MELODY Alonso MD, FACS Unavailable         Unavailable

 

                    Brooks Munguia, MELODY Alonso MD, FACS Unavailable         Unavailable

 

                    Brooks Munguia, MELODY Alonso MD, FACS Unavailable         Unavailable

 

                    Brooks Munguia, MELODY Alonso MD, FACS Unavailable         Unavailable

 

                    Brooks Munguia, MELODY Alonso MD, FACS Unavailable         Unavailable

 

                    Brooks Munguia, MELODY Alonso MD, FACS Unavailable         Unavailable

 

                    Cecilia Munguia, MELODY Alonso MD, FACS Unavailable         Unavailable

 

                    Cecilia Munguia, MELODY Alonso MD, FACS Unavailable         Unavailable

 

                    Cecilia Munguia, MELODY Alonso MD, FACS Unavailable         Unavailable

 

                    Altamirano, L Maryuri RPA Unavailable         Unavailable

 

                    Altamirano, L Maryuri RPA Unavailable         Unavailable

 

                    Altamirano, L Maryuri RPA Unavailable         Unavailable

 

                    Altamirano, L Maryuri RPA Unavailable         Unavailable

 

                    Altamirano, L Maryuri RPA Unavailable         Unavailable

 

                    Altamirano, L Maryuri RPA Unavailable         Unavailable

 

                    Altamirano, L Maryuri RPA Unavailable         Unavailable

 

                    Altamirano, L Maryuri RPA Unavailable         Unavailable

 

                    Altamirano, L Maryuri RPA Unavailable         Unavailable

 

                    Altamirano, L Maryuri RPA Unavailable         Unavailable

 

                    Altamirano, L Maryuri RPA Unavailable         Unavailable

 

                    Altamirano, L Maryuri RPA Unavailable         Unavailable

 

                    Altamirano, L Maryuri RPA Unavailable         Unavailable

 

                    Altamirano, L Maryuri RPA Unavailable         Unavailable

 

                    Altamirano, L Maryuri RPA Unavailable         Unavailable

 

                    Altamirano, L Maryuri RPA Unavailable         Unavailable

 

                    Altamirano, L Maryuri RPA Unavailable         Unavailable

 

                    Altamirano, L Maryuri RPA Unavailable         Unavailable

 

                    Altamirano, L Maryuri RPA Unavailable         Unavailable

 

                    Altamirano, L Maryuri RPA Unavailable         Unavailable

 

                    Altamirano, L Maryuri RPA Unavailable         Unavailable

 

                    Altamirano, L Maryuri RPA Unavailable         Unavailable

 

                    Altamirano, L Maryuri RPA Unavailable         Unavailable

 

                    Altamirano, L Maryuri RPA Unavailable         Unavailable

 

                    Altamirano, L Maryuri RPA Unavailable         Unavailable

 

                    Altamirano, L Maryuri RPA Unavailable         Unavailable

 

                    Altamirano, L Maryuri RPA Unavailable         Unavailable

 

                    Altamirano, L Maryuri RPA Unavailable         Unavailable

 

                    Altamirano, L Maryuri RPA Unavailable         Unavailable

 

                    Altamirano, L Maryuri RPA Unavailable         Unavailable

 

                    Altamirano, L Maryuri RPA Unavailable         Unavailable

 

                    Altamirano, L Maryuri RPA Unavailable         Unavailable

 

                    Tyler, Mi Young PT    Unavailable         Unavailable

 

                    Tyler, Mi Young PT    Unavailable         Unavailable

 

                    Tyler, Mi Young PT    Unavailable         Unavailable

 

                    Tyler, Mi Young PT    Unavailable         Unavailable

 

                    Tyler, Mi Young PT    Unavailable         Unavailable

 

                    Tyler, Mi Young PT    Unavailable         Unavailable

 

                    Tyler, Mi Young PT    Unavailable         Unavailable

 

                    Tyler, Mi Young PT    Unavailable         Unavailable

 

                    Tyler, Mi Young PT    Unavailable         Unavailable

 

                    Tyler, Mi Young PT    Unavailable         Unavailable

 

                    Tyler, Mi Young PT    Unavailable         Unavailable

 

                    Tyler, Mi Young PT    Unavailable         Unavailable

 

                    Tyler, Mi Young PT    Unavailable         Unavailable

 

                    Tyler, Mi Young PT    Unavailable         Unavailable

 

                    Tyler, Mi Young PT    Unavailable         Unavailable

 

                    Tyler, Mi Young PT    Unavailable         Unavailable

 

                    Tyler, Mi Young PT    Unavailable         Unavailable

 

                    Tyler, Mi Young PT    Unavailable         Unavailable

 

                    Tyler, Mi Young PT    Unavailable         Unavailable

 

                    Tyler, Mi Young PT    Unavailable         Unavailable

 

                    Tyler, Mi Young PT    Unavailable         Unavailable

 

                    Tyler, Mi Young PT    Unavailable         Unavailable

 

                    Tyler, Mi Young PT    Unavailable         Unavailable

 

                    Lyndsay English PA    Unavailable         Unavailable

 

                    Amador,  Lyndsay PA    Unavailable         Unavailable

 

                    Amador,  Lyndsay PA    Unavailable         Unavailable

 

                    Amador,  Lyndsay PA    Unavailable         Unavailable

 

                    Amador,  Lyndsay PA    Unavailable         Unavailable

 

                    Amador,  Lyndsay PA    Unavailable         Unavailable

 

                    Amador,  Lyndsay PA    Unavailable         Unavailable

 

                    Amador,  Lyndsay PA    Unavailable         Unavailable

 

                    Amador,  Lyndsay PA    Unavailable         Unavailable

 

                    Amador,  Lyndsay PA    Unavailable         Unavailable

 

                    Amador,  Lyndsay PA    Unavailable         Unavailable

 

                    Amador,  Lyndsay PA    Unavailable         Unavailable

 

                    Amador,  Lyndsay PA    Unavailable         Unavailable

 

                    Amador,  Lyndsay PA    Unavailable         Unavailable

 

                    Amador,  Lyndsay PA    Unavailable         Unavailable

 

                    Amador,  Lyndsay PA    Unavailable         Unavailable

 

                    Amador,  Lyndsay PA    Unavailable         Unavailable

 

                    Amador,  Lyndsay PA    Unavailable         Unavailable

 

                    Amador,  Lyndsay PA    Unavailable         Unavailable

 

                    Amador,  Lyndsay PA    Unavailable         Unavailable

 

                    Amador,  Lyndsay PA    Unavailable         Unavailable

 

                    Amador,  Lyndsay PA    Unavailable         Unavailable

 

                    Amador,  Lyndsay PA    Unavailable         Unavailable

 

                    Amador,  Lyndsay PA    Unavailable         Unavailable

 

                    Amador,  Lyndsay PA    Unavailable         Unavailable

 

                    Amador,  Lyndsay PA    Unavailable         Unavailable

 

                    Amador,  Lyndsay PA    Unavailable         Unavailable

 

                    Amador,  Lyndsay PA    Unavailable         Unavailable

 

                    Amador,  Lyndsay PA    Unavailable         Unavailable

 

                    Amador,  Lyndsay PA    Unavailable         Unavailable

 

                    Amador,  Lyndsay PA    Unavailable         Unavailable

 

                    Amador,  Lyndsay PA    Unavailable         Unavailable

 

                    Amador,  Lyndsay PA    Unavailable         Unavailable

 

                    Amador,  Lyndsay PA    Unavailable         Unavailable

 

                    Amador,  Lyndsay PA    Unavailable         Unavailable

 

                    Amador,  Lyndsay PA    Unavailable         Unavailable

 

                    Amador,  Lyndsay PA    Unavailable         Unavailable

 

                    Amador,  Lyndsay PA    Unavailable         Unavailable

 

                    Amador,  Lyndsay PA    Unavailable         Unavailable

 

                    Amador,  Lyndsay PA    Unavailable         Unavailable

 

                    Amador,  Lyndsay PA    Unavailable         Unavailable

 

                    Amador,  Lyndsay PA    Unavailable         Unavailable

 

                    Amador,  Lyndsay PA    Unavailable         Unavailable

 

                    Amador,  Lyndsay PA    Unavailable         Unavailable

 

                    Amador,  Lyndsay PA    Unavailable         Unavailable

 

                    Amador,  Lyndsay PA    Unavailable         Unavailable

 

                    Amador,  Lyndsay PA    Unavailable         Unavailable

 

                    Amador,  Lyndsay PA    Unavailable         Unavailable

 

                    Amador,  Lyndsay PA    Unavailable         Unavailable



                                  



Re-disclosure Warning

          The records that you are about to access may contain information from 
federally-assisted alcohol or drug abuse programs. If such information is 
present, then the following federally mandated warning applies: This information
has been disclosed to you from records protected by federal confidentiality 
rules (42 CFR part 2). The federal rules prohibit you from making any further 
disclosure of this information unless further disclosure is expressly permitted 
by the written consent of the person to whom it pertains or as otherwise 
permitted by 42 CFR part 2. A general authorization for the release of medical 
or other information is NOT sufficient for this purpose. The Federal rules 
restrict any use of the information to criminally investigate or prosecute any 
alcohol or drug abuse patient.The records that you are about to access may 
contain highly sensitive health information, the redisclosure of which is 
protected by Article 27-F of the [facility] Public Health law. If you 
continue you may have access to information: Regarding HIV / AIDS; Provided by 
facilities licensed or operated by the [facility] Office of Mental Health; 
or Provided by the [facility] Office for People With Developmental 
Disabilities. If such information is present, then the following [place] 
mandated warning applies: This information has been disclosed to you from 
confidential records which are protected by state law. State law prohibits you 
from making any further disclosure of this information without the specific 
written consent of the person to whom it pertains, or as otherwise permitted by 
law. Any unauthorized further disclosure in violation of state law may result in
a fine or penitentiary sentence or both. A general authorization for the release of 
medical or other information is NOT sufficient authorization for further disc
losure.                                                                         
    



Allergies and Adverse Reactions

          



           Type       Description Substance  Reaction   Status     Data Source(s

)

 

           Allergy to substance No Known Allergies No known allergies (situation

)                       

[place] (Gavin Munguia MD [facility])



                                                                                
       



Family History

          



             Family Member Name Family Member Gender Family Member Status Date o

f Status 

Description                             Data Source(s)

 

           Unknown    Male       Problem                          [facility] ([facility])

 

                                        () 

 

           Unknown    Female     Problem                          [facility] ([facility] )

 

           Unknown    Female     Problem                          [facility] ([facility] )



                                                                                
                 



Encounters

          



           Encounter  Providers  Location   Date       Indications Data Source(s

)

 

                Outpatient      Attender: Sadaf Bailey [facility] Main Office     

2021 12:15:00 PM 

EDT                                                 [facility]

 

                Office Visit    Attender: Maryuri Arellano/Raymond/Trina catherine 2021 

01:30:00 PM EDT                                     [facility] ([facility]

[facility], )

 

                Outpatient      Attender: Sadaf Bailey [facility] Main Office     

2021 03:45:00 PM 

EDT                                                 [facility] ([facility]

 

                                        <td ID="encounterTypeDescriptionID0">2 Y

ear Follow-Up</td><td>Gavin Munguia MD, FACS</td><td>Gavin Walker MD 
[facility]</td><td>2021</td><td>11:49AM</td><td>12:36PM</td><td><content 
ID="encounterDiagnosisID0-0">Cataract Senile Posterior Subcapsular 
Polar</content>, <content ID="encounterDiagnosisID0-1">Cataract Senile 
Cortical</content>, <content ID="encounterDiagnosisID0-2">Cataract Senile 
Nuclear</content>, <content ID="encounterDiagnosisID0-3">Taking Medication For 
Diabetes Long-term Use of Oral Hypoglycemics</content>, <content 
ID="encounterDiagnosisID0-4">Glaucoma Open-angle Primary Both Eyes</content>, 
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  2021 11:49:00 AM EDT -

 2021 

12:36:00 PM EDT                         Taking Medication For Diabetes Long-term

 Use of Oral 

HypoglycemicsCataract Senile Posterior Subcapsular PolarType 2 Diab W/ Diab 
Retinopathy Mild Nonprolif Without Macular EdemaCataract Senile CorticalGlaucoma
Open-angle Primary Both EyesCataract Senile Nuclear [facility] (Gavin Munguia MD [facility])

 

                                        Taking Medication For Diabetes Long-term

 Use of Oral Hypoglycemics 

 

                                        Cataract Senile Posterior Subcapsular Po

lar 

 

                                        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

prolif Without Macular Edema 

 

                                        Cataract Senile Cortical 

 

                                        Glaucoma Open-angle Primary Both Eyes 

 

                                        Cataract Senile Nuclear 

 

           Outpatient Attender: Lyndsay VALENCIA Main Office 2021 11:00:00 A

M EDT            

[facility] ([facility])

 

                Outpatient      Attender: Petr Varela MD Physical Therapy  10:15:00 AM 

EDT                                                 [facility] ([facility]

[facility] )

 

             Outpatient   Attender: Colin Scott PT              2021 05:05:0

0 PM EDT FULL INCONTINENCE

OF FECES DIARRHEA FECAL SMEARING        [facility]

 

                                        FULL INCONTINENCE OF FECES DIARRHEA FECA

L SMEARING 

 

           Outpatient Attender: Colin Scott PT            2021 05:05:00 PM E

[facility], NP: [id number], [place] [address], Ph. [phone] Attender: Page MARIE [place] [facility] 10/21/2020 12:00:00 AM EDT                     ATHE

[place] ([facility])

 

                                        Richard Wheatley MD: [id number], [place] [address]

[address], Ph. [phone]  Attender: Richard Wheatley MD [place] [facility] 10/05/2020 12:00:00 AM EDT                     SAIDA 

([facility])

 

                                        Richard Wheatley MD: [address], [place] [address]

[address], Ph. [phone]  Attender: Richard Wheatley [place] [facility] 10/05/2020 12:00:00 AM EDT                     [facility]IDA 

([facility])

 

                                        Richard Wheatley MD: [address], [place] [address]

[address], Ph. [phone]      Attender: Richard Wheatley MD [place] [facility] 2020 12:00:00 AM EDT                     SAIDA 

([facility])

 

                                        Richard Wheatley MD: [address], [place] [address]

[address], Ph. [phone]      Attender: Richard Wheatley MD [place] [facility] 2020 12:00:00 AM EDT                     [facility]IDA 

([facility])

 

                                        Richard Wheatley MD: [address], [place] [address], Ph. [phone]      Attender: Richard Wheatley MD [place] [facility] 2020 12:00:00 AM EDT                     SAIDA 

([facility])

 

                                        Richard Wheatley MD: [address], [place] [address]

[address], Ph. [phone]  Attender: Richard Wheatley MD [place] [facility] 2020 12:00:00 AM EDT                     SAIDA 

([facility])

 

                                        Richard Wheatley MD: [address], [place] [address]

[address], Ph. [phone]  Attender: Richard SAHA - [facility] 2020 12:00:00 AM EDT                     SAIDA 

([facility])

 

                                        Richard Wheatley MD: [address], [place] [address]

[address], Ph. [phone]  Attender: Richard SAHA - [facility] 2020 12:00:00 AM EDT                     SAIDA 

([facility])

 

                                        Richard Wheatley MD: [address], [place] [address]

[address], Ph. [phone]  Attender: Richard SAHA - [facility] 2020 12:00:00 AM EDT                     SAIDA 

([facility])

 

                                        Richard Wheatley MD: [address], [place] [address]

[address], Ph. [phone]      Attender: Richard SAHA - [facility] 2020 12:00:00 AM EDT                     SAIDA 

([facility])

 

                                        Richard Wheatley MD: [address], [place] [address]

[address], Ph. [phone]      Attender: Richard SAHA - [facility] 2020 12:00:00 AM EDT                     SAIDA 

([facility])

 

                                        Richard Wheatley MD: [address], [place] [address]

[address], Ph. [phone]      Attender: Richard SAHA - [facility] 2020 12:00:00 AM EDT                     SAIDA 

([facility]

 

                                        Richard Wheatley MD: [address], [place] [address]

[address], Ph. [phone]      Attender: Richard Wheatley MD [place] [facility] 2020 12:00:00 AM EDT                     SAIDA 

([facility])

 

                                        Richard Wheatley MD: [address], [place] [address]

[address], Ph. [phone]      Attender: Richard SAHA - [facility] 2020 12:00:00 AM EDT                     SAIDA 

([facility]

 

                                        Richard Wheatley MD: [address], [place] [address]

[address], Ph. [phone]  Attender: Richard SAHA - [facility] 2020 12:00:00 AM EDT                     SAIDA 

([facility])

 

                                        Richard Wheatley MD: [address], [place] [address]

[address], Ph. [phone]  Attender: Richard SAHA - [facility] 2020 12:00:00 AM EDT                     SAIDA 

([facility])

 

                                        Richard Wheatley MD: [address], [place] [address]

[address], Ph. [phone]  Attender: Richard SAHA - [facility] 2020 12:00:00 AM EDT                     SAIDA 

([facility])

 

                                        Richard Wheatley MD: [address], [place] [address]

[address], Ph. [phone]  Attender: Richard SAHA - [facility] 2020 12:00:00 AM EDT                     SAIDA 

([facility])

 

                                        Richard Wheatley MD: [address], [place] [address]

[address], Ph. [phone]  Attender: Richard Wheatley MD [place] [facility] 2020 12:00:00 AM EDT                     [facility]IDA 

([facility]

 

                                        Richard Wheatley MD: [address], [place] [address]

[address], Ph. [phone]  Attender: Richard Wheat[place] [facility] 2020 12:00:00 AM EDT                     SAIDA 

([facility])

 

                                        Richard Wheatley MD: [address], [place] [address]

[address], Ph. [phone]      Attender: Richard SAHA - [facility] 2020 12:00:00 AM EDT                     SAIDA 

([facility])

 

                                        Richard Wheatley MD: [address], [place] [address]

[address], Ph. [phone]      Attender: Richard Wheat[place] MD [place] [facility] 2020 12:00:00 AM EDT                     SAIDA 

([facility])

 

                                        Richard Wheatley MD: [address], [place] [address]

[address], Ph. [phone]      Attender: Richard Wheat[place] [facility] 2020 12:00:00 AM EDT                     SAIDA 

([facility])

 

                                        Richard Wheatley MD: [address], [place] [address]

[address], Ph. [phone]      Attender: Richard SAHA - [facility] 2020 12:00:00 AM EDT                     SAIDA 

([facility])

 

                                        Richard Wheatley MD: [address], [place] [address]

[address], Ph. [phone]      Attender: Richard Wheat[place] MD [place] [facility] 2020 12:00:00 AM EDT                     SAIDA 

([facility])

 

                                        Richard Wheatley MD: [address], [place] [address]

[address], Ph. [phone]      Attender: Richard Wheatley MD [place] [facility] 2020 12:00:00 AM EDT                     SAIDA 

([facility])

 

                                        Richard Wheatley MD: [address], [place] [address]

[address], Ph. [phone]      Attender: Richard Wheatley MD [place] [facility] 2020 12:00:00 AM EDT                     SAIDA 

([facility])

 

                                        Richard Wheatley MD: [address], [place] [address]

[address], Ph. [phone]  Attender: Richard Wheatley MD [place] [facility] 2020 12:00:00 AM EDT                     [facility]IDA 

([facility])

 

                                        Richard Wheatley MD: [address], [place] [address]

[address], Ph. [phone]  Attender: Richard Wheatley MD [place] [facility] 2020 12:00:00 AM EDT                     SAIDA 

([facility])

 

                                        Richard Wheatley MD: [address], [place] [address]

[address], Ph. [phone]  Attender: Richard Wheat[place] MD [place] [facility] 2020 12:00:00 AM EDT                     SAIDA 

([facility])

 

                                        Richard Wheatley MD: [address], [place] [address]

[address], Ph. [phone]  Attender: Richard Wheatley MD [place] [facility] 2020 12:00:00 AM EDT                     SAIDA 

([facility])

 

                                        Richard Wheatley MD: [address], [place] [address]

[address], Ph. [phone]  Attender: Richard Wheatley MD [place] [facility] 2020 12:00:00 AM EDT                     SAIDA 

([facility])

 

                                        Richard Wheatley MD: [address], [place] [address]

[address], Ph. [phone]  Attender: Richard Wheatley MD [place] [facility] 2020 12:00:00 AM EDT                     SAIDA 

([facility])

 

                                        Richard Wheatley MD: [address], [place] [address]

[address], Ph. [phone]  Attender: Richard Wheatley MD [place] [facility] 2020 12:00:00 AM EDT                     SAIDA 

([facility])

 

                                        Richard Wheatley MD: [address], [place] [address]

[address], Ph. [phone]  Attender: Richard Wheatley MD [place] [facility] 2020 12:00:00 AM EDT                     SAIDA 

([facility])



                                                                                
                                                                                
                                                                                
                                                                                
                                                                                
                                                                                
                                



Immunizations

          



             Vaccine      Date         Status       Description  Data Source(s)

 

             COVID-19 VACC, MRNA(PFIZER)/PF 10/20/2021 12:00:00 AM EDT completed

                 [facility]

 

             COVID-19 VACCINE Pfizer 2021 12:00:00 AM EST completed       

          [facility]

 

                                        Vaccine Series Complete: YESThis Data wa

s Submitted to [facility]. 

 

             COVID-19 VACCINE Pfizer 2021 12:00:00 AM EST completed       

          [facility]

 

                                        Vaccine Series Complete: NOThis Data was

 Submitted to [facility] [employer]. 

 

                          INFLUENZA VIRUS VACCINE QUADRIVAL SPLIT -(65 YR 

UP)/PF 10/13/2020 12:00:00

AM EDT              completed                               [facility]



                                                                                
                                     



Medications

          



          Medication Brand Name Start Date Product Form Dose      Route     Admi

nistrative 

Instructions Pharmacy Instructions Status     Indications Reaction   Description

 Data 

Source(s)

 

                          Amlodipine 5 MG / Benazepril hydrochloride 10 MG Oral 

Capsule 5-10 mg AMLODIPINE

BESYLATE/BENAZEPRIL 10/19/2021 12:00:00 AM EDT capsule      90                  

      TAKE ONE CAPSULE BY 

MOUTH EVERY DAY TAKE ONE CAPSULE BY MOUTH EVERY DAY SOLD: 10/20/2021            

                      

[facility]

 

        240 mcg/0.7 mL         10/07/2021 12:00:00 AM EDT syringe 0             

  INJECT AS DIRECTED 

INJECT AS DIRECTED SOLD: 10/07/2021                                        [facility]

 

          2.5 mg              10/04/2021 12:00:00 AM EDT tablet extended release

 24hr 90                  TAKE ONE 

TABLET BY MOUTH EVERY DAY TAKE ONE TABLET BY MOUTH EVERY DAY SOLD: 10/05/2021   

              

                                                    [facility]

 

          5 %                 2021 12:00:00 AM EDT cream     40           

       APPLY TOPICALLY TO LATERAL FOREHEAD,

TEMPLES AND LEFT EAR SPARINGLY TWO TIMES A DAY APPLY TOPICALLY TO LATERAL 

FOREHEAD, TEMPLES AND LEFT EAR SPARINGLY TWO TIMES A DAY SOLD: 10/05/2021       

                                 

[facility]

 

           Fluorouracil 50 MG/ML Topical Cream Fluorouracil 2021 12:00:00 

AM EDT                       

                              active                                  [facility] ([facility]

 

          0.4 mg              2021 12:00:00 AM EDT capsule   90           

       TAKE ONE CAPSULE BY MOUTH EVERY

DAY        TAKE ONE CAPSULE BY MOUTH EVERY DAY SOLD: 2021                 

                 [facility]

 

          25 mg               2021 12:00:00 AM EDT tablet    180          

       TAKE TWO TABLETS BY MOUTH EVERY 

DAY        TAKE TWO TABLETS BY MOUTH EVERY DAY SOLD: 2021                 

                 [facility]

 

          400 mg              2021 12:00:00 AM EDT capsule   90           

       TAKE ONE CAPSULE BY MOUTH TWICE

A DAY      TAKE ONE CAPSULE BY MOUTH TWICE A DAY SOLD: 2021               

                   [facility]

 

          400 mg              2021 12:00:00 AM EDT capsule   90           

       TAKE ONE CAPSULE BY MOUTH TWICE

A DAY      TAKE ONE CAPSULE BY MOUTH TWICE A DAY SOLD: 10/20/2021               

                   [facility]

 

          25 mg               2021 12:00:00 AM EDT tablet    90           

       TAKE ONE TABLET BY MOUTH EVERY 

DAY        TAKE ONE TABLET BY MOUTH EVERY DAY SOLD: 09/10/2021                  

                [facility]

 

          1,250 mcg (50,000 unit)           2021 12:00:00 AM EDT capsule  

 12                  TAKE ONE 

CAPSULE BY MOUTH WEEKLY TAKE ONE CAPSULE BY MOUTH WEEKLY SOLD: 09/10/2021       

                           

[facility]

 

          90 mcg/actuation           2021 12:00:00 AM EDT HFA aerosol inha

ler 8                   INHALE TWO

PUFFS BY MOUTH EVERY 6 HOURS AS NEEDED  INHALE TWO PUFFS BY MOUTH EVERY 6 HOURS 

AS NEEDED    SOLD: 2021                                        [facility]

[facility]

 

                          Amlodipine 5 MG / Benazepril hydrochloride 10 MG Oral 

Capsule 5-10 mg AMLODIPINE

BESYLATE/BENAZEPRIL 2021 12:00:00 AM EDT capsule      90                  

      TAKE ONE CAPSULE BY 

MOUTH EVERY DAY TAKE ONE CAPSULE BY MOUTH EVERY DAY SOLD: 2021            

                      

[facility]

 

                                        Brimonidine tartrate 2 MG/ML / Timolol 5

 MG/ML Ophthalmic Solution [Combigan] 

0.2-0.5 %  BRIMONIDINE TARTRATE/TIMOLOL 2021 12:00:00 AM EDT drops      15

                    

INSTILL 1 DROP IN EACH EYE TWO TIMES A DAY INSTILL 1 DROP IN EACH EYE TWO TIMES 

A DAY        SOLD: 2021                                        [facility]

[facility]

 

                                        Brimonidine tartrate 2 MG/ML / Timolol 5

 MG/ML Ophthalmic Solution [Combigan] 

0.2-0.5 %  BRIMONIDINE TARTRATE/TIMOLOL 2021 12:00:00 AM EDT drops      15

                    

INSTILL 1 DROP IN EACH EYE TWO TIMES A DAY INSTILL 1 DROP IN EACH EYE TWO TIMES 

A DAY        SOLD: 2021                                        [facility]

[facility]

 

                                        Brimonidine tartrate 2 MG/ML / Timolol 5

 MG/ML Ophthalmic Solution [Combigan] 

Combigan 0.2-0.5% Ophthalmic Solution Combigan 0.2-0.5% Ophthalmic Solution 

2021 12:00:00 AM EDT                                         active       

           brimonidine tartrate 2 MG/ML / 

timolol 5 MG/ML Ophthalmic Solution [Combigan] [facility] (Gavin Munguia MD 

[facility])

 

                                        travoprost 0.04 MG/ML Ophthalmic Solutio

n [Travatan] Travatan Z 0.004% 

Ophthalmic Solution       Travatan Z 0.004% Ophthalmic Solution 2021 12:00

:00 AM

 EDT          1                           aborted               travoprost 0.04 

MG/ML Ophthalmic Solution [Travatan] 

[facility]ERN (Gavin Munguia MD [facility])

 

                                        Brimonidine tartrate 2 MG/ML / Timolol 5

 MG/ML Ophthalmic Solution [Combigan] 

Combigan 0.2-0.5% Ophthalmic Solution Combigan 0.2-0.5% Ophthalmic Solution 

2021 12:00:00 AM EDT                                         aborted      

           brimonidine tartrate 2 MG/ML / 

timolol 5 MG/ML Ophthalmic Solution [Combigan] FERN (Gavin Munguia MD 

[facility])

 

          2.5 mg              2021 12:00:00 AM EDT tablet extended release

 24hr 90                  TAKE ONE 

TABLET BY MOUTH EVERY DAY TAKE ONE TABLET BY MOUTH EVERY DAY SOLD: 2021   

              

                                                    [facility]

 

          0.4 mg              2021 12:00:00 AM EDT capsule   90           

       TAKE ONE CAPSULE BY MOUTH EVERY

 DAY       TAKE ONE CAPSULE BY MOUTH EVERY DAY SOLD: 2021                 

                 [facility]

 

                                        Brimonidine tartrate 2 MG/ML / Timolol 5

 MG/ML Ophthalmic Solution [Combigan] 

Combigan 0.2-0.5% Ophthalmic Solution Combigan 0.2-0.5% Ophthalmic Solution 

06/15/2021 12:00:00 AM EDT                                         aborted      

           brimonidine tartrate 2 MG/ML / 

timolol 5 MG/ML Ophthalmic Solution [Combigan] FERN (Gavin Munguia MD 

[facility])

 

                                        Brimonidine tartrate 2 MG/ML / Timolol 5

 MG/ML Ophthalmic Solution [Combigan] 

0.2-0.5 %  BRIMONIDINE TARTRATE/TIMOLOL 06/15/2021 12:00:00 AM EDT drops      5 

                    

INSTILL 1 DROP IN EACH EYE TWO TIMES A DAY INSTILL 1 DROP IN EACH EYE TWO TIMES 

A DAY        SOLD: 2021                                        [facility]

s

 

          400 mg              2021 12:00:00 AM EDT capsule   90           

       TAKE ONE CAPSULE BY MOUTH TWICE

 A DAY     TAKE ONE CAPSULE BY MOUTH TWICE A DAY SOLD: 2021               

                   [facility]

 

          400 mg              2021 12:00:00 AM EDT capsule   90           

       TAKE ONE CAPSULE BY MOUTH TWICE

 A DAY     TAKE ONE CAPSULE BY MOUTH TWICE A DAY SOLD: 2021               

                   [facility]

 

          25 mg               2021 12:00:00 AM EDT tablet    180          

       TAKE TWO TABLETS BY MOUTH EVERY 

DAY        TAKE TWO TABLETS BY MOUTH EVERY DAY SOLD: 2021                 

                 [facility]

 

          1,250 mcg (50,000 unit)           2021 12:00:00 AM EDT capsule  

 12                  TAKE 1 CAPSULE

 BY MOUTH ONCE A WEEK TAKE 1 CAPSULE BY MOUTH ONCE A WEEK SOLD: 2021      

                      

                                        [facility]

 

                                        Brimonidine tartrate 2 MG/ML / Timolol 5

 MG/ML Ophthalmic Solution [Combigan] 

Combigan 0.2-0.5% Ophthalmic Solution Combigan 0.2-0.5% Ophthalmic Solution 

05/10/2021 12:00:00 AM EDT                                         aborted      

           brimonidine tartrate 2 MG/ML / 

timolol 5 MG/ML Ophthalmic Solution [Combigan] FERN (Gavin Munguia MD 

[facility])

 

                                        Brimonidine tartrate 2 MG/ML / Timolol 5

 MG/ML Ophthalmic Solution [Combigan] 

0.2-0.5 %  BRIMONIDINE TARTRATE/TIMOLOL 05/10/2021 12:00:00 AM EDT drops      5 

                    

INSTILL 1 DROP TWO TIMES A DAY IN BOTH EYES INSTILL 1 DROP TWO TIMES A DAY IN 

BOTH EYES    SOLD: 2021                                        [facility]

s

 

                36,000-114,000- 180,000 unit                 2021 12:00:00

 AM EDT capsule,delayed 

release(DR/EC)  90                              TAKE ONE CAPSULE BY MOUTH THREE 

TIMES A DAY WITH EACH MEAL 

TAKE ONE CAPSULE BY MOUTH THREE TIMES A DAY WITH EACH MEAL SOLD: 09/10/2021     

                            

                                        [facility]

 

                36,000-114,000- 180,000 unit                 2021 12:00:00

 AM EDT capsule,delayed 

release(DR/EC)  90                              TAKE ONE CAPSULE BY MOUTH THREE 

TIMES A DAY WITH EACH MEAL 

TAKE ONE CAPSULE BY MOUTH THREE TIMES A DAY WITH EACH MEAL SOLD: 2021     

                            

                                        [facility]

 

                36,000-114,000- 180,000 unit                 2021 12:00:00

 AM EDT capsule,delayed 

release(DR/EC)  90                              TAKE ONE CAPSULE BY MOUTH THREE 

TIMES A DAY WITH EACH MEAL 

TAKE ONE CAPSULE BY MOUTH THREE TIMES A DAY WITH EACH MEAL SOLD: 2021     

                            

                                        [facility]

 

                36,000-114,000- 180,000 unit                 2021 12:00:00

 AM EDT capsule,delayed 

release(DR/EC)  90                              TAKE ONE CAPSULE BY MOUTH THREE 

TIMES A DAY WITH EACH MEAL 

TAKE ONE CAPSULE BY MOUTH THREE TIMES A DAY WITH EACH MEAL SOLD: 2021     

                            

                                        [facility]

 

                          Amlodipine 5 MG / Benazepril hydrochloride 10 MG Oral 

Capsule 5-10 mg AMLODIPINE

 BESYLATE/BENAZEPRIL 2021 12:00:00 AM EDT capsule      90                 

       TAKE ONE CAPSULE BY

 MOUTH EVERY DAY TAKE ONE CAPSULE BY MOUTH EVERY DAY SOLD: 04/15/2021           

                       

[facility]

 

          2.5 mg              2021 12:00:00 AM EDT tablet extended release

 24hr 90                  TAKE ONE 

TABLET BY MOUTH EVERY DAY TAKE ONE TABLET BY MOUTH EVERY DAY SOLD: 2021   

              

                                                    [facility]

 

          0.4 mg              03/15/2021 12:00:00 AM EDT capsule   90           

       TAKE ONE CAPSULE BY MOUTH EVERY

 DAY       TAKE ONE CAPSULE BY MOUTH EVERY DAY SOLD: 2021                 

                 [facility]

 

          Atenolol 25 MG Oral Tablet ATENOLOL  03/15/2021 12:00:00 AM EDT tablet

    180                 TAKE

 TWO TABLETS BY MOUTH EVERY DAY TAKE TWO TABLETS BY MOUTH EVERY DAY SOLD: 

2021                                                      [facility]

 

          1,250 mcg (50,000 unit)           2021 12:00:00 AM EST capsule  

 12                  TAKE ONE 

CAPSULE BY MOUTH ONCE WEEKLY TAKE ONE CAPSULE BY MOUTH ONCE WEEKLY SOLD: 

2021                                                      [facility]

 

          400 mg              2021 12:00:00 AM EST capsule   90           

       TAKE ONE CAPSULE BY MOUTH TWICE

 A DAY     TAKE ONE CAPSULE BY MOUTH TWICE A DAY SOLD: 2021               

                   [facility]

 

          400 mg              2021 12:00:00 AM EST capsule   90           

       TAKE ONE CAPSULE BY MOUTH TWICE

 A DAY     TAKE ONE CAPSULE BY MOUTH TWICE A DAY SOLD: 2021               

                   [facility]

 

                atorvastatin 20 MG Oral Tablet ATORVASTATIN CALCIUM 2021 1

2:00:00 AM EST 

tablet          45                              TAKE ONE TABLET BY MOUTH EVERY O

THER DAY TAKE ONE TABLET BY MOUTH 

EVERY OTHER DAY SOLD: 2021                                        [facility]

 

                atorvastatin 20 MG Oral Tablet ATORVASTATIN CALCIUM 2021 1

2:00:00 AM EST 

tablet          45                              TAKE ONE TABLET BY MOUTH EVERY O

THER DAY TAKE ONE TABLET BY MOUTH 

EVERY OTHER DAY SOLD: 2021                                        [facility]

 

                atorvastatin 20 MG Oral Tablet ATORVASTATIN CALCIUM 2021 1

2:00:00 AM EST 

tablet          45                              TAKE ONE TABLET BY MOUTH EVERY O

THER DAY TAKE ONE TABLET BY MOUTH 

EVERY OTHER DAY SOLD: 2021                                        [facility]

 

          BLOOD SUGAR DIAGNOSTIC           2021 12:00:00 AM EST strip     

200                 USE AS DIRECTED 

TO TEST TWO TIMES A DAY   USE AS DIRECTED TO TEST TWO TIMES A DAY SOLD: 20

21

                                                                [facility]

 

          2.5 mg              2021 12:00:00 AM EST tablet extended release

 24hr 90                  TAKE ONE 

TABLET BY MOUTH EVERY DAY TAKE ONE TABLET BY MOUTH EVERY DAY SOLD: 2021   

              

                                                    [facility]

 

          5-10 mg             2021 12:00:00 AM EST capsule   90           

       TAKE ONE CAPSULE BY MOUTH 

EVERY DAY  TAKE ONE CAPSULE BY MOUTH EVERY DAY SOLD: 2021                 

                 [facility]

 

          1,250 mcg (50,000 unit)           2020 12:00:00 AM EST capsule  

 12                  TAKE ONE 

CAPSULE BY MOUTH ONCE WEEKLY TAKE ONE CAPSULE BY MOUTH ONCE WEEKLY SOLD: 

2020                                                      [facility]

 

          400 mg              2020 12:00:00 AM EST capsule   90           

       TAKE ONE CAPSULE BY MOUTH TWICE

 A DAY     TAKE ONE CAPSULE BY MOUTH TWICE A DAY SOLD: 2021               

                   [facility]

 

          400 mg              2020 12:00:00 AM EST capsule   90           

       TAKE ONE CAPSULE BY MOUTH TWICE

 A DAY     TAKE ONE CAPSULE BY MOUTH TWICE A DAY SOLD: 2020               

                   [facility]

 

          0.4 mg              2020 12:00:00 AM EDT capsule   90           

       TAKE ONE CAPSULE BY MOUTH EVERY

 DAY       TAKE ONE CAPSULE BY MOUTH EVERY DAY SOLD: 2020                 

                 [facility]

 

          0.4 mg              2020 12:00:00 AM EDT capsule   90           

       TAKE ONE CAPSULE BY MOUTH EVERY

 DAY       TAKE ONE CAPSULE BY MOUTH EVERY DAY SOLD: 2020                 

                 [facility]

 

          Atenolol 25 MG Oral Tablet ATENOLOL  2020 12:00:00 AM EDT tablet

    180                 TAKE

 TWO TABLETS BY MOUTH EVERY DAY TAKE TWO TABLETS BY MOUTH EVERY DAY SOLD: 

2020                                                      [facility]

 

          25 mg               2020 12:00:00 AM EDT tablet    180          

       TAKE TWO TABLETS BY MOUTH EVERY 

DAY        TAKE TWO TABLETS BY MOUTH EVERY DAY SOLD: 2020                 

                 [facility]

 

          90 mcg/actuation           2020 12:00:00 AM EDT HFA aerosol inha

ler 8                   INHALE TWO

 PUFFS BY MOUTH EVERY 6 HOURS AS NEEDED INHALE TWO PUFFS BY MOUTH EVERY 6 HOURS 

AS NEEDED    SOLD: 2020                                        [facility]

[facility]

 

          25 mg               2020 12:00:00 AM EDT tablet    90           

       TAKE ONE TABLET BY MOUTH EVERY 

DAY        TAKE ONE TABLET BY MOUTH EVERY DAY SOLD: 2020                  

                [facility]

 

          25 mg               2020 12:00:00 AM EDT tablet    90           

       TAKE ONE TABLET BY MOUTH EVERY 

DAY        TAKE ONE TABLET BY MOUTH EVERY DAY SOLD: 2021                  

                [facility]

 

          1,250 mcg (50,000 unit)           2020 12:00:00 AM EDT capsule  

 12                  TAKE ONE 

CAPSULE BY MOUTH WEEKLY TAKE ONE CAPSULE BY MOUTH WEEKLY SOLD: 2020       

                           

[facility]

 

          2.5 mg              2020 12:00:00 AM EDT tablet extended release

 24hr 90                  TAKE ONE 

TABLET BY MOUTH EVERY DAY TAKE ONE TABLET BY MOUTH EVERY DAY SOLD: 2020   

              

                                                    [facility]

 

          5-10 mg             2020 12:00:00 AM EDT capsule   90           

       TAKE ONE CAPSULE BY MOUTH 

EVERY DAY  TAKE ONE CAPSULE BY MOUTH EVERY DAY SOLD: 10/13/2020                 

                 [facility]

 

          400 mg              2020 12:00:00 AM EDT capsule   90           

       TAKE ONE CAPSULE BY MOUTH TWICE

 A DAY     TAKE ONE CAPSULE BY MOUTH TWICE A DAY SOLD: 10/13/2020               

                   [facility]

 

                                        Brimonidine tartrate 2 MG/ML / Timolol 5

 MG/ML Ophthalmic Solution [Combigan] 

Combigan 0.2-0.5% Ophthalmic Solution Combigan 0.2-0.5% Ophthalmic Solution 

2020 12:00:00 AM EDT                                         aborted      

           brimonidine tartrate 2 MG/ML / 

timolol 5 MG/ML Ophthalmic Solution [Combigan] FERN (Gavin Munguia MD 

[facility])

 

          0.2-0.5 %           2020 12:00:00 AM EDT drops     15           

       INSTILL 1 DROP INTO BOTH EYES 

TWICE A DAY INSTILL 1 DROP INTO BOTH EYES TWICE A DAY SOLD: 2021          

                        

[facility]

 

          0.2-0.5 %           2020 12:00:00 AM EDT drops     15           

       INSTILL 1 DROP INTO BOTH EYES 

TWICE A DAY INSTILL 1 DROP INTO BOTH EYES TWICE A DAY SOLD: 2020          

                        

[facility]

 

          0.2-0.5 %           2020 12:00:00 AM EDT drops     15           

       INSTILL 1 DROP INTO BOTH EYES 

TWICE A DAY INSTILL 1 DROP INTO BOTH EYES TWICE A DAY SOLD: 2020          

                        

[facility]

 

                atorvastatin 20 MG Oral Tablet ATORVASTATIN CALCIUM 2020 1

2:00:00 AM EST 

tablet          45                              TAKE ONE TABLET BY MOUTH EVERY O

THER DAY TAKE ONE TABLET BY MOUTH 

EVERY OTHER DAY SOLD: 12/10/2020                                        [facility]

 

                atorvastatin 20 MG Oral Tablet ATORVASTATIN CALCIUM 2020 1

2:00:00 AM EST 

tablet          45                              TAKE ONE TABLET BY MOUTH EVERY O

THER DAY TAKE ONE TABLET BY MOUTH 

EVERY OTHER DAY SOLD: 2020                                        [facility]

 

                                        travoprost 0.04 MG/ML Ophthalmic Solutio

n [Travatan] Travatan Z 0.004% 

Ophthalmic Solution       Travatan Z 0.004% Ophthalmic Solution 2019 12:00

:00 AM

 EST          1                           aborted               travoprost 0.04 

MG/ML Ophthalmic Solution [Travatan] 

FERN (Gavin Munguia MD [facility])

 

                                        12 HR CHLORPHENIRAMINE POLISTIREX 1.6 MG

/ML / HYDROCODONE POLISTIREX 2 MG/ML 

Extended Release Suspension hydrocodone 10 mg-chlorpheniramine 8 mg/5 mL oral 
susp extend.rel 12hr TAKE 5ML BY MOUTH EVERY 12 HOURS AS NEEDED FOR COUGH 
MAXIMUM DAILY DOSE   10ML               hydrocodone 10 mg-chlorpheniramine 8 mg/

5 mL oral susp

 extend.rel 12hr TAKE 5ML BY MOUTH EVERY 12 HOURS AS NEEDED FOR COUGH MAXIMUM 
DAILY DOSE   10ML                                           completed           

    12 HR chlorpheniramine polistirex 

1.6 MG/ML / hydrocodone polistirex 2 MG/ML Extended Release Suspension SAIDA 

([facility])

 

                                        Ciprofloxacin 3 MG/ML Ophthalmic Solutio

n ciprofloxacin 0.3 % eye drops STARTING

 1 DAY PRIOR TO SURGERY PLACE 1 DROP IN THE RIGHT EYE FOUR TIMES A DAY THEN 
CONTINUE FOR 1 WEEK                     ciprofloxacin 0.3 % eye drops STARTING 1

 DAY PRIOR TO 

SURGERY PLACE 1 DROP IN THE RIGHT EYE FOUR TIMES A DAY THEN CONTINUE FOR 1 WEEK 

                                          completed               ciprofloxacin 

3 MG/ML Ophthalmic Solution SAIDA ([facility]

 [facility])

 

                                        Ergocalciferol 19149 UNT Oral Capsule Vi

tamin D2 1,250 mcg (50,000 unit) capsule

 TAKE ONE CAPSULE BY MOUTH WEEKLY       Vitamin D2 1,250 mcg (50,000 unit) capsu

le 

TAKE ONE CAPSULE BY MOUTH WEEKLY                                           compl

eted               ergocalciferol 1.25 

MG Oral Capsule                         SAIDA ([facility])

 

                                        travoprost 0.04 MG/ML Ophthalmic Solutio

n [Travatan] Travatan Z 0.004 % eye 

drops INSTILL 1 DROP IN EACH EYE IN THE EVENING Travatan Z 0.004 % eye drops 

INSTILL 1 DROP IN EACH EYE IN THE EVENING                                       

    completed               travoprost 

0.04 MG/ML Ophthalmic Solution [Travatan] SAIDA ([facility])

 

                                        200 ACTUAT Albuterol 0.09 MG/ACTUAT Mete

red Dose Inhaler [ProAir] ProAir HFA 90 

mcg/actuation aerosol inhaler INHALE TWO PUFFS BY MOUTH EVERY 6 HOURS AS NEEDED 

ProAir HFA 90 mcg/actuation aerosol inhaler INHALE TWO PUFFS BY MOUTH EVERY 6 
HOURS AS NEEDED                                           completed             

  [id number] 200 ACTUAT albuterol 0.09 

MG/ACTUAT Metered Dose Inhaler [ProAir] SAIDA ([facility])

 

                                        Ketorolac Tromethamine 4 MG/ML Ophthalmi

c Solution ketorolac 0.4 % eye drops 

STARTING 1 DAY PRIOR TO SURGERY PLACE 1 DROP IN THE RIGHT EYE FOUR TIMES A DAY 
THEN TAPER AS DIRECTED                  ketorolac 0.4 % eye drops STARTING 1 DAY

 PRIOR TO SURGERY

 PLACE 1 DROP IN THE RIGHT EYE FOUR TIMES A DAY THEN TAPER AS DIRECTED          

                                            

                      completed                        ketorolac tromethamine 4 

MG/ML Ophthalmic Solution SAIDA ([facility]

 [facility])

 

                                        prednisolone acetate 10 MG/ML Ophthalmic

 Suspension prednisolone acetate 1 % eye

 drops,suspension INSTILL ONE DROP IN THE RIGHT EYE FOUR TIMES A DAY FOR 1 WEEK 
THEN TAPER AS DIRECTED                  prednisolone acetate 1 % eye drops,suspe

nsion INSTILL ONE

 DROP IN THE RIGHT EYE FOUR TIMES A DAY FOR 1 WEEK THEN TAPER AS DIRECTED       

                                          

                              completed                     prednisolone acetate

 10 MG/ML Ophthalmic Suspension SAIDA 

([facility])

 

                                        Ketorolac Tromethamine 4 MG/ML Ophthalmi

c Solution ketorolac 0.4 % eye drops 

STARTING 1 DAY PRIOR TO SURGERY PLACE 1 DROP IN THE RIGHT EYE FOUR TIMES A DAY 
THEN TAPER AS DIRECTED                  ketorolac 0.4 % eye drops STARTING 1 DAY

 PRIOR TO SURGERY

 PLACE 1 DROP IN THE RIGHT EYE FOUR TIMES A DAY THEN TAPER AS DIRECTED          

                                            

                      completed                        ketorolac tromethamine 4 

MG/ML Ophthalmic Solution SAIDA ([facility]

 [facility])

 

                                        Cholecalciferol 14917 UNT Oral Capsule c

holecalciferol (vitamin D3) 1,250 mcg 

(50,000 unit) capsule TAKE 1 CAPSULE BY MOUTH ONCE A WEEK cholecalciferol 

(vitamin D3) 1,250 mcg (50,000 unit) capsule TAKE 1 CAPSULE BY MOUTH ONCE A WEEK
                                          completed             cholecalciferol 

1.25 MG Oral Capsule SAIDA ([facility] 

[facility])

 

                                        12 HR CHLORPHENIRAMINE POLISTIREX 1.6 MG

/ML / HYDROCODONE POLISTIREX 2 MG/ML 

Extended Release Suspension hydrocodone 10 mg-chlorpheniramine 8 mg/5 mL oral 
susp extend.rel 12hr TAKE 5ML BY MOUTH EVERY 12 HOURS AS NEEDED FOR COUGH 
MAXIMUM DAILY DOSE   10ML               hydrocodone 10 mg-chlorpheniramine 8 mg/

5 mL oral susp

 extend.rel 12hr TAKE 5ML BY MOUTH EVERY 12 HOURS AS NEEDED FOR COUGH MAXIMUM 
DAILY DOSE   10ML                                           completed           

    12 HR chlorpheniramine polistirex 

1.6 MG/ML / hydrocodone polistirex 2 MG/ML Extended Release Suspension SAIDA 

([facility])

 

                                        Ciprofloxacin 3 MG/ML Ophthalmic Solutio

n ciprofloxacin 0.3 % eye drops STARTING

 1 DAY PRIOR TO SURGERY PLACE 1 DROP IN THE RIGHT EYE FOUR TIMES A DAY THEN 
CONTINUE FOR 1 WEEK                     ciprofloxacin 0.3 % eye drops STARTING 1

 DAY PRIOR TO 

SURGERY PLACE 1 DROP IN THE RIGHT EYE FOUR TIMES A DAY THEN CONTINUE FOR 1 WEEK 

                                          completed               ciprofloxacin 

3 MG/ML Ophthalmic Solution SA[facility] ([facility]

 [facility])

 

                                        200 ACTUAT Albuterol 0.09 MG/ACTUAT Mete

red Dose Inhaler [ProAir] ProAir HFA 90 

mcg/actuation aerosol inhaler INHALE TWO PUFFS BY MOUTH EVERY 6 HOURS AS NEEDED 

ProAir HFA 90 mcg/actuation aerosol inhaler INHALE TWO PUFFS BY MOUTH EVERY 6 
HOURS AS NEEDED                                           completed             

  [id number] 200 ACTUAT albuterol 0.09 

MG/ACTUAT Metered Dose Inhaler [ProAir] SAIDA ([facility])

 

                          Loperamide Hydrochloride 2 MG Oral Capsule loperamide 

2 mg capsule loperamide 2 

mg capsule                                     completed             loperamide 

hydrochloride 2 MG Oral Capsule 

SAIDA ([facility])

 

                          gabapentin 300 MG Oral Capsule gabapentin 300 mg capsu

le gabapentin 300 mg 

capsule                                     completed             gabapentin 300

 MG Oral Capsule SA[facility] ([facility] 

[facility])

 

                                        200 ACTUAT Albuterol 0.09 MG/ACTUAT Mete

red Dose Inhaler [ProAir] ProAir HFA 90 

mcg/actuation aerosol inhaler INHALE TWO PUFFS BY MOUTH EVERY 6 HOURS AS NEEDED 

ProAir HFA 90 mcg/actuation aerosol inhaler INHALE TWO PUFFS BY MOUTH EVERY 6 
HOURS AS NEEDED                                           completed             

  [id number] 200 ACTUAT albuterol 0.09 

MG/ACTUAT Metered Dose Inhaler [ProAir] SAIDA ([facility])

 

                                        Cholecalciferol 73590 UNT Oral Capsule c

holecalciferol (vitamin D3) 1,250 mcg 

(50,000 unit) capsule TAKE 1 CAPSULE BY MOUTH ONCE A WEEK cholecalciferol 

(vitamin D3) 1,250 mcg (50,000 unit) capsule TAKE 1 CAPSULE BY MOUTH ONCE A WEEK
                                          completed             cholecalciferol 

1.25 MG Oral Capsule SAIDA ([facility] 

[facility])

 

                                        Flucelvax Quad [id number] 60 mcg (15 mcg 

x 4)/0.5 mL intramuscular susp INJECT 

0.5ML INTRAMUSCULARLY [id number]                                     completed      

       influenza A virus 

A/ (H1N1) antigen 0.03 MG/ML / influenza A virus A/ 
(H3N2) antigen 0.03 MG/ML / influenza B virus B/ antigen 0.03 MG/ML 
/ influenza B virus B/[place]/ antigen 0.03 MG/ML Injectable
 Suspension [Flucelvax Quadrivalent [id number]] SAIDA ([facility])

 

                                        12 HR CHLORPHENIRAMINE POLISTIREX 1.6 MG

/ML / HYDROCODONE POLISTIREX 2 MG/ML 

Extended Release Suspension hydrocodone 10 mg-chlorpheniramine 8 mg/5 mL oral 
susp extend.rel 12hr TAKE 5ML BY MOUTH EVERY 12 HOURS AS NEEDED FOR COUGH 
MAXIMUM DAILY DOSE   10ML               hydrocodone 10 mg-chlorpheniramine 8 mg/

5 mL oral susp

extend.rel 12hr TAKE 5ML BY MOUTH EVERY 12 HOURS AS NEEDED FOR COUGH MAXIMUM 
DAILY DOSE   10ML                                           completed           

    12 HR chlorpheniramine polistirex 

1.6 MG/ML / hydrocodone polistirex 2 MG/ML Extended Release Suspension SAIDA 

([facility])

 

                          Loperamide Hydrochloride 2 MG Oral Capsule loperamide 

2 mg capsule loperamide 2 

mg capsule                                     completed             loperamide 

hydrochloride 2 MG Oral Capsule 

SAIDA ([facility])

 

                                        travoprost 0.04 MG/ML Ophthalmic Solutio

n [Travatan] Travatan Z 0.004 % eye 

drops INSTILL 1 DROP IN EACH EYE IN THE EVENING Travatan Z 0.004 % eye drops 

INSTILL 1 DROP IN EACH EYE IN THE EVENING                                       

    completed               travoprost 

0.04 MG/ML Ophthalmic Solution [Travatan] SAIDA ([facility])

 

                                        Cholecalciferol 52448 UNT Oral Capsule c

holecalciferol (vitamin D3) 1,250 mcg 

(50,000 unit) capsule TAKE 1 CAPSULE BY MOUTH ONCE A WEEK cholecalciferol 

(vitamin D3) 1,250 mcg (50,000 unit) capsule TAKE 1 CAPSULE BY MOUTH ONCE A WEEK
                                          completed             cholecalciferol 

1.25 MG Oral Capsule [facility] ([facility] 

[facility])

 

                                        travoprost 0.04 MG/ML Ophthalmic Solutio

n [Travatan] Travatan Z 0.004 % eye 

drops INSTILL 1 DROP IN EACH EYE IN THE EVENING Travatan Z 0.004 % eye drops 

INSTILL 1 DROP IN EACH EYE IN THE EVENING                                       

    completed               travoprost 

0.04 MG/ML Ophthalmic Solution [Travatan] [facility] ([facility])

 

                                        Flucelvax Quad [id number] 60 mcg (15 mcg 

x 4)/0.5 mL intramuscular susp INJECT 

0.5ML INTRAMUSCULARLY [id number]                                     completed      

       influenza A virus 

A/ (H1N1) antigen 0.03 MG/ML / influenza A virus A/ 
(H3N2) antigen 0.03 MG/ML / influenza B virus B/ antigen 0.03 MG/ML 
/ influenza B virus B/[place]/LVFGJ-/2016 antigen 0.03 MG/ML Injectable
 Suspension [Flucelvax Quadrivalent 7499-0710] [facility] ([facility])

 

                                        Ergocalciferol 27482 UNT Oral Capsule Vi

tamin D2 1,250 mcg (50,000 unit) capsule

TAKE ONE CAPSULE BY MOUTH WEEKLY        Vitamin D2 1,250 mcg (50,000 unit) capsu

le TAKE

ONE CAPSULE BY MOUTH WEEKLY                                           completed 

              ergocalciferol 1.25 MG 

Oral Capsule                            [facility] ([facility])

 

                                        Cholecalciferol 41788 UNT Oral Capsule c

holecalciferol (vitamin D3) 1,250 mcg 

(50,000 unit) capsule TAKE 1 CAPSULE BY MOUTH ONCE A WEEK cholecalciferol 

(vitamin D3) 1,250 mcg (50,000 unit) capsule TAKE 1 CAPSULE BY MOUTH ONCE A WEEK
                                          completed             cholecalciferol 

1.25 MG Oral Capsule [facility] ([facility])

 

                                        Flucelvax Quad 1346-4083 60 mcg (15 mcg 

x 4)/0.5 mL intramuscular susp INJECT 

0.5ML INTRAMUSCULARLY [id number]                                     completed      

       influenza A virus 

A/[place] (H1N1) antigen 0.03 MG/ML / influenza A virus A/ 
(H3N2) antigen 0.03 MG/ML / influenza B virus B/ antigen 0.03 MG/ML 
/ influenza B virus B/[facility]- antigen 0.03 MG/ML Injectable
Suspension [Flucelvax Quadrivalent 2627-9113] [facility] ([facility])

 

                                        travoprost 0.04 MG/ML Ophthalmic Solutio

n [Travatan] Travatan Z 0.004 % eye 

drops INSTILL 1 DROP IN EACH EYE IN THE EVENING Travatan Z 0.004 % eye drops 

INSTILL 1 DROP IN EACH EYE IN THE EVENING                                       

    completed               travoprost 

0.04 MG/ML Ophthalmic Solution [Travatan] [facility] ([facility])

 

                          Loperamide Hydrochloride 2 MG Oral Capsule loperamide 

2 mg capsule loperamide 2 

mg capsule                                     completed             loperamide 

hydrochloride 2 MG Oral Capsule 

[facility] ([facility])

 

                                        prednisolone acetate 10 MG/ML Ophthalmic

 Suspension prednisolone acetate 1 % eye

 drops,suspension INSTILL ONE DROP IN THE RIGHT EYE FOUR TIMES A DAY FOR 1 WEEK 
THEN TAPER AS DIRECTED                  prednisolone acetate 1 % eye drops,suspe

nsion INSTILL ONE

 DROP IN THE RIGHT EYE FOUR TIMES A DAY FOR 1 WEEK THEN TAPER AS DIRECTED       

                                          

                              completed                     prednisolone acetate

 10 MG/ML Ophthalmic Suspension [facility] 

([facility])

 

                          Loperamide Hydrochloride 2 MG Oral Capsule loperamide 

2 mg capsule loperamide 2 

mg capsule                                     completed             loperamide 

hydrochloride 2 MG Oral Capsule 

[facility] ([facility])

 

                                        Ketorolac Tromethamine 4 MG/ML Ophthalmi

c Solution ketorolac 0.4 % eye drops 

STARTING 1 DAY PRIOR TO SURGERY PLACE 1 DROP IN THE RIGHT EYE FOUR TIMES A DAY 
THEN TAPER AS DIRECTED                  ketorolac 0.4 % eye drops STARTING 1 DAY

 PRIOR TO SURGERY

 PLACE 1 DROP IN THE RIGHT EYE FOUR TIMES A DAY THEN TAPER AS DIRECTED          

                                            

                      completed                        ketorolac tromethamine 4 

MG/ML Ophthalmic Solution [facility] ([facility])

 

                                        12 HR CHLORPHENIRAMINE POLISTIREX 1.6 MG

/ML / HYDROCODONE POLISTIREX 2 MG/ML 

Extended Release Suspension hydrocodone 10 mg-chlorpheniramine 8 mg/5 mL oral 
susp extend.rel 12hr TAKE 5ML BY MOUTH EVERY 12 HOURS AS NEEDED FOR COUGH 
MAXIMUM DAILY DOSE   10ML               hydrocodone 10 mg-chlorpheniramine 8 mg/

5 mL oral susp

 extend.rel 12hr TAKE 5ML BY MOUTH EVERY 12 HOURS AS NEEDED FOR COUGH MAXIMUM 
DAILY DOSE   10ML                                           completed           

    12 HR chlorpheniramine polistirex 

1.6 MG/ML / hydrocodone polistirex 2 MG/ML Extended Release Suspension [facility] 

([facility])

 

                                        Cholecalciferol 34403 UNT Oral Capsule c

holecalciferol (vitamin D3) 1,250 mcg 

(50,000 unit) capsule TAKE 1 CAPSULE BY MOUTH ONCE A WEEK cholecalciferol 

(vitamin D3) 1,250 mcg (50,000 unit) capsule TAKE 1 CAPSULE BY MOUTH ONCE A WEEK
                                          completed             cholecalciferol 

1.25 MG Oral Capsule [facility] ([facility])

 

                                        Ketorolac Tromethamine 4 MG/ML Ophthalmi

c Solution ketorolac 0.4 % eye drops 

STARTING 1 DAY PRIOR TO SURGERY PLACE 1 DROP IN THE RIGHT EYE FOUR TIMES A DAY 
THEN TAPER AS DIRECTED                  ketorolac 0.4 % eye drops STARTING 1 DAY

 PRIOR TO SURGERY

 PLACE 1 DROP IN THE RIGHT EYE FOUR TIMES A DAY THEN TAPER AS DIRECTED          

                                            

                      completed                        ketorolac tromethamine 4 

MG/ML Ophthalmic Solution [facility] ([facility])

 

                          gabapentin 300 MG Oral Capsule gabapentin 300 mg capsu

le gabapentin 300 mg 

capsule                                     completed             gabapentin 300

 MG Oral Capsule [facility] ([facility])

 

                                        Ergocalciferol 33432 UNT Oral Capsule Vi

tamin D2 1,250 mcg (50,000 unit) capsule

 TAKE ONE CAPSULE BY MOUTH WEEKLY       Vitamin D2 1,250 mcg (50,000 unit) capsu

le 

TAKE ONE CAPSULE BY MOUTH WEEKLY                                           compl

eted               ergocalciferol 1.25 

MG Oral Capsule                         [facility] ([facility])

 

                          gabapentin 300 MG Oral Capsule gabapentin 300 mg capsu

le gabapentin 300 mg 

capsule                                     completed             gabapentin 300

 MG Oral Capsule [facility] ([facility] 

[facility])

 

                                        prednisolone acetate 10 MG/ML Ophthalmic

 Suspension prednisolone acetate 1 % eye

 drops,suspension INSTILL ONE DROP IN THE RIGHT EYE FOUR TIMES A DAY FOR 1 WEEK 
THEN TAPER AS DIRECTED                  prednisolone acetate 1 % eye drops,suspe

nsion INSTILL ONE

 DROP IN THE RIGHT EYE FOUR TIMES A DAY FOR 1 WEEK THEN TAPER AS DIRECTED       

                                          

                              completed                     prednisolone acetate

 10 MG/ML Ophthalmic Suspension [facility] 

([facility])

 

                          Loperamide Hydrochloride 2 MG Oral Capsule loperamide 

2 mg capsule loperamide 2 

mg capsule                                     completed             loperamide 

hydrochloride 2 MG Oral Capsule 

[facility] ([facility])

 

                                        200 ACTUAT Albuterol 0.09 MG/ACTUAT Mete

red Dose Inhaler [ProAir] ProAir HFA 90 

mcg/actuation aerosol inhaler INHALE TWO PUFFS BY MOUTH EVERY 6 HOURS AS NEEDED 

ProAir HFA 90 mcg/actuation aerosol inhaler INHALE TWO PUFFS BY MOUTH EVERY 6 
HOURS AS NEEDED                                           completed             

  [id number] 200 ACTUAT albuterol 0.09 

MG/ACTUAT Metered Dose Inhaler [ProAir] [facility] ([facility])

 

                                        200 ACTUAT Albuterol 0.09 MG/ACTUAT Mete

red Dose Inhaler [ProAir] ProAir HFA 90 

mcg/actuation aerosol inhaler INHALE TWO PUFFS BY MOUTH EVERY 6 HOURS AS NEEDED 

ProAir HFA 90 mcg/actuation aerosol inhaler INHALE TWO PUFFS BY MOUTH EVERY 6 
HOURS AS NEEDED                                           completed             

  [id number] 200 ACTUAT albuterol 0.09 

MG/ACTUAT Metered Dose Inhaler [ProAir] [facility] ([facility])

 

                                        Cholecalciferol 60317 UNT Oral Capsule c

holecalciferol (vitamin D3) 1,250 mcg 

(50,000 unit) capsule TAKE 1 CAPSULE BY MOUTH ONCE A WEEK cholecalciferol 

(vitamin D3) 1,250 mcg (50,000 unit) capsule TAKE 1 CAPSULE BY MOUTH ONCE A WEEK
                                          completed             cholecalciferol 

1.25 MG Oral Capsule [facility] ([facility])

 

                          gabapentin 300 MG Oral Capsule gabapentin 300 mg capsu

le gabapentin 300 mg 

capsule                                     completed             gabapentin 300

 MG Oral Capsule [facility] ([facility])

 

                          gabapentin 300 MG Oral Capsule gabapentin 300 mg capsu

le gabapentin 300 mg 

capsule                                     completed             gabapentin 300

 MG Oral Capsule [facility] ([facility])

 

                                        Cholecalciferol 23417 UNT Oral Capsule c

holecalciferol (vitamin D3) 1,250 mcg 

(50,000 unit) capsule TAKE 1 CAPSULE BY MOUTH ONCE A WEEK cholecalciferol 

(vitamin D3) 1,250 mcg (50,000 unit) capsule TAKE 1 CAPSULE BY MOUTH ONCE A WEEK
                                          completed             cholecalciferol 

1.25 MG Oral Capsule [facility] ([facility])

 

                                        Ciprofloxacin 3 MG/ML Ophthalmic Solutio

n ciprofloxacin 0.3 % eye drops STARTING

 1 DAY PRIOR TO SURGERY PLACE 1 DROP IN THE RIGHT EYE FOUR TIMES A DAY THEN 
CONTINUE FOR 1 WEEK                     ciprofloxacin 0.3 % eye drops STARTING 1

 DAY PRIOR TO 

SURGERY PLACE 1 DROP IN THE RIGHT EYE FOUR TIMES A DAY THEN CONTINUE FOR 1 WEEK 

                                          completed               ciprofloxacin 

3 MG/ML Ophthalmic Solution [facility] ([facility])

 

                                        12 HR CHLORPHENIRAMINE POLISTIREX 1.6 MG

/ML / HYDROCODONE POLISTIREX 2 MG/ML 

Extended Release Suspension hydrocodone 10 mg-chlorpheniramine 8 mg/5 mL oral 
susp extend.rel 12hr TAKE 5ML BY MOUTH EVERY 12 HOURS AS NEEDED FOR COUGH 
MAXIMUM DAILY DOSE   10ML               hydrocodone 10 mg-chlorpheniramine 8 mg/

5 mL oral susp

 extend.rel 12hr TAKE 5ML BY MOUTH EVERY 12 HOURS AS NEEDED FOR COUGH MAXIMUM 
DAILY DOSE   10ML                                           completed           

    12 HR chlorpheniramine polistirex 

1.6 MG/ML / hydrocodone polistirex 2 MG/ML Extended Release Suspension [facility] 

([facility])

 

                                        Cholecalciferol 81980 UNT Oral Capsule c

holecalciferol (vitamin D3) 1,250 mcg 

(50,000 unit) capsule TAKE 1 CAPSULE BY MOUTH ONCE A WEEK cholecalciferol 

(vitamin D3) 1,250 mcg (50,000 unit) capsule TAKE 1 CAPSULE BY MOUTH ONCE A WEEK
                                          completed             cholecalciferol 

1.25 MG Oral Capsule [facility] ([facility])

 

                                        12 HR CHLORPHENIRAMINE POLISTIREX 1.6 MG

/ML / HYDROCODONE POLISTIREX 2 MG/ML 

Extended Release Suspension hydrocodone 10 mg-chlorpheniramine 8 mg/5 mL oral 
susp extend.rel 12hr TAKE 5ML BY MOUTH EVERY 12 HOURS AS NEEDED FOR COUGH 
MAXIMUM DAILY DOSE   10ML               hydrocodone 10 mg-chlorpheniramine 8 mg/

5 mL oral susp

 extend.rel 12hr TAKE 5ML BY MOUTH EVERY 12 HOURS AS NEEDED FOR COUGH MAXIMUM 
DAILY DOSE   10ML                                           completed           

    12 HR chlorpheniramine polistirex 

1.6 MG/ML / hydrocodone polistirex 2 MG/ML Extended Release Suspension [facility] 

([facility])

 

                          gabapentin 300 MG Oral Capsule gabapentin 300 mg capsu

le gabapentin 300 mg 

capsule                                     completed             gabapentin 300

 MG Oral Capsule [facility] ([facility])

 

                                        Flucelvax Quad [id number] 60 mcg (15 mcg 

x 4)/0.5 mL intramuscular susp INJECT 

0.5ML INTRAMUSCULARLY [id number]                                     completed      

       influenza A virus 

A/[place] (H1N1) antigen 0.03 MG/ML / influenza A virus A/[place] 
(H3N2) antigen 0.03 MG/ML / influenza B virus B/ antigen 0.03 MG/ML 
/ influenza B virus B/[facility]- antigen 0.03 MG/ML Injectable
 Suspension [Flucelvax Quadrivalent 4105-7943] [facility] ([facility])

 

                                        travoprost 0.04 MG/ML Ophthalmic Solutio

n [Travatan] Travatan Z 0.004 % eye 

drops INSTILL 1 DROP IN EACH EYE IN THE EVENING Travatan Z 0.004 % eye drops 

INSTILL 1 DROP IN EACH EYE IN THE EVENING                                       

    completed               travoprost 

0.04 MG/ML Ophthalmic Solution [Travatan] [facility] ([facility])

 

                          gabapentin 300 MG Oral Capsule gabapentin 300 mg capsu

le gabapentin 300 mg 

capsule                                     completed             gabapentin 300

 MG Oral Capsule [facility] ([facility])

 

                                        Ketorolac Tromethamine 4 MG/ML Ophthalmi

c Solution ketorolac 0.4 % eye drops 

STARTING 1 DAY PRIOR TO SURGERY PLACE 1 DROP IN THE RIGHT EYE FOUR TIMES A DAY 
THEN TAPER AS DIRECTED                  ketorolac 0.4 % eye drops STARTING 1 DAY

 PRIOR TO SURGERY

 PLACE 1 DROP IN THE RIGHT EYE FOUR TIMES A DAY THEN TAPER AS DIRECTED          

                                            

                      completed                        ketorolac tromethamine 4 

MG/ML Ophthalmic Solution [facility] ([facility])

 

                                        Ciprofloxacin 3 MG/ML Ophthalmic Solutio

n ciprofloxacin 0.3 % eye drops STARTING

 1 DAY PRIOR TO SURGERY PLACE 1 DROP IN THE RIGHT EYE FOUR TIMES A DAY THEN 
CONTINUE FOR 1 WEEK                     ciprofloxacin 0.3 % eye drops STARTING 1

 DAY PRIOR TO 

SURGERY PLACE 1 DROP IN THE RIGHT EYE FOUR TIMES A DAY THEN CONTINUE FOR 1 WEEK 

                                          completed               ciprofloxacin 

3 MG/ML Ophthalmic Solution [facility] ([facility])

 

                          gabapentin 300 MG Oral Capsule gabapentin 300 mg capsu

le gabapentin 300 mg 

capsule                                     completed             gabapentin 300

 MG Oral Capsule [facility] ([facility])

 

                                        Flucelvax Quad [id number] 60 mcg (15 mcg 

x 4)/0.5 mL intramuscular susp INJECT 

0.5ML INTRAMUSCULARLY [id number]                                     completed      

       influenza A virus 

A/[place] (H1N1) antigen 0.03 MG/ML / influenza A virus A/[place] 
(H3N2) antigen 0.03 MG/ML / influenza B virus B/ antigen 0.03 MG/ML 
/ influenza B virus B/[facility]-/2016 antigen 0.03 MG/ML Injectable
 Suspension [Flucelvax Quadrivalent [id number]] [facility] ([facility])

 

                          Loperamide Hydrochloride 2 MG Oral Capsule loperamide 

2 mg capsule loperamide 2 

mg capsule                                     completed             loperamide 

hydrochloride 2 MG Oral Capsule 

[facility] ([facility])

 

                                        prednisolone acetate 10 MG/ML Ophthalmic

 Suspension prednisolone acetate 1 % eye

drops,suspension INSTILL ONE DROP IN THE RIGHT EYE FOUR TIMES A DAY FOR 1 WEEK 
THEN TAPER AS DIRECTED                  prednisolone acetate 1 % eye drops,suspe

nsion INSTILL ONE

DROP IN THE RIGHT EYE FOUR TIMES A DAY FOR 1 WEEK THEN TAPER AS DIRECTED        

                                          

                              completed                     prednisolone acetate

 10 MG/ML Ophthalmic Suspension [facility] 

([facility])

 

                                        Ciprofloxacin 3 MG/ML Ophthalmic Solutio

n ciprofloxacin 0.3 % eye drops STARTING

1 DAY PRIOR TO SURGERY PLACE 1 DROP IN THE RIGHT EYE FOUR TIMES A DAY THEN 
CONTINUE FOR 1 WEEK                     ciprofloxacin 0.3 % eye drops STARTING 1

 DAY PRIOR TO 

SURGERY PLACE 1 DROP IN THE RIGHT EYE FOUR TIMES A DAY THEN CONTINUE FOR 1 WEEK 

                                          completed               ciprofloxacin 

3 MG/ML Ophthalmic Solution [facility] ([facility])

 

                                        travoprost 0.04 MG/ML Ophthalmic Solutio

n [Travatan] Travatan Z 0.004 % eye 

drops INSTILL 1 DROP IN EACH EYE IN THE EVENING Travatan Z 0.004 % eye drops 

INSTILL 1 DROP IN EACH EYE IN THE EVENING                                       

    completed               travoprost 

0.04 MG/ML Ophthalmic Solution [Travatan] [facility] ([facility])

 

                                        travoprost 0.04 MG/ML Ophthalmic Solutio

n [Travatan] Travatan Z 0.004 % eye 

drops INSTILL 1 DROP IN EACH EYE IN THE EVENING Travatan Z 0.004 % eye drops 

INSTILL 1 DROP IN EACH EYE IN THE EVENING                                       

    completed               travoprost 

0.04 MG/ML Ophthalmic Solution [Travatan] [facility] ([facility])

 

                                        Ciprofloxacin 3 MG/ML Ophthalmic Solutio

n ciprofloxacin 0.3 % eye drops STARTING

 1 DAY PRIOR TO SURGERY PLACE 1 DROP IN THE RIGHT EYE FOUR TIMES A DAY THEN 
CONTINUE FOR 1 WEEK                     ciprofloxacin 0.3 % eye drops STARTING 1

 DAY PRIOR TO 

SURGERY PLACE 1 DROP IN THE RIGHT EYE FOUR TIMES A DAY THEN CONTINUE FOR 1 WEEK 

                                          completed               ciprofloxacin 

3 MG/ML Ophthalmic Solution [facility] ([facility])

 

                                        12 HR CHLORPHENIRAMINE POLISTIREX 1.6 MG

/ML / HYDROCODONE POLISTIREX 2 MG/ML 

Extended Release Suspension hydrocodone 10 mg-chlorpheniramine 8 mg/5 mL oral 
susp extend.rel 12hr TAKE 5ML BY MOUTH EVERY 12 HOURS AS NEEDED FOR COUGH 
MAXIMUM DAILY DOSE   10ML               hydrocodone 10 mg-chlorpheniramine 8 mg/

5 mL oral susp

 extend.rel 12hr TAKE 5ML BY MOUTH EVERY 12 HOURS AS NEEDED FOR COUGH MAXIMUM 
DAILY DOSE   10ML                                           completed           

    12 HR chlorpheniramine polistirex 

1.6 MG/ML / hydrocodone polistirex 2 MG/ML Extended Release Suspension [facility] 

([facility])

 

                                        Ergocalciferol 64982 UNT Oral Capsule Vi

tamin D2 1,250 mcg (50,000 unit) capsule

 TAKE ONE CAPSULE BY MOUTH WEEKLY       Vitamin D2 1,250 mcg (50,000 unit) capsu

le 

TAKE ONE CAPSULE BY MOUTH WEEKLY                                           compl

eted               ergocalciferol 1.25 

MG Oral Capsule                         [facility] ([facility])

 

                          Loperamide Hydrochloride 2 MG Oral Capsule loperamide 

2 mg capsule loperamide 2 

mg capsule                                     completed             loperamide 

hydrochloride 2 MG Oral Capsule 

[facility] ([facility])

 

                                        prednisolone acetate 10 MG/ML Ophthalmic

 Suspension prednisolone acetate 1 % eye

 drops,suspension INSTILL ONE DROP IN THE RIGHT EYE FOUR TIMES A DAY FOR 1 WEEK 
THEN TAPER AS DIRECTED                  prednisolone acetate 1 % eye drops,suspe

nsion INSTILL ONE

 DROP IN THE RIGHT EYE FOUR TIMES A DAY FOR 1 WEEK THEN TAPER AS DIRECTED       

                                          

                              completed                     prednisolone acetate

 10 MG/ML Ophthalmic Suspension [facility] 

([facility])

 

                                        200 ACTUAT Albuterol 0.09 MG/ACTUAT Mete

red Dose Inhaler [ProAir] ProAir HFA 90 

mcg/actuation aerosol inhaler INHALE TWO PUFFS BY MOUTH EVERY 6 HOURS AS NEEDED 

ProAir HFA 90 mcg/actuation aerosol inhaler INHALE TWO PUFFS BY MOUTH EVERY 6 
HOURS AS NEEDED                                           completed             

  [id number] 200 ACTUAT albuterol 0.09 

MG/ACTUAT Metered Dose Inhaler [ProAir] [facility] ([facility])

 

                                        Flucelvax Quad [id number] 60 mcg (15 mcg 

x 4)/0.5 mL intramuscular susp INJECT 

0.5ML INTRAMUSCULARLY [id number]                                     completed      

       influenza A virus 

A/[place] (H1N1) antigen 0.03 MG/ML / influenza A virus A/ 
(H3N2) antigen 0.03 MG/ML / influenza B virus B/ antigen 0.03 MG/ML 
/ influenza B virus B/[facility]- antigen 0.03 MG/ML Injectable
 Suspension [Flucelvax Quadrivalent 4706-1008] [facility] ([facility])

 

                                        prednisolone acetate 10 MG/ML Ophthalmic

 Suspension prednisolone acetate 1 % eye

drops,suspension INSTILL ONE DROP IN THE RIGHT EYE FOUR TIMES A DAY FOR 1 WEEK 
THEN TAPER AS DIRECTED                  prednisolone acetate 1 % eye drops,suspe

nsion INSTILL ONE

DROP IN THE RIGHT EYE FOUR TIMES A DAY FOR 1 WEEK THEN TAPER AS DIRECTED        

                                          

                              completed                     prednisolone acetate

 10 MG/ML Ophthalmic Suspension [facility] 

([facility])

 

                                        Ketorolac Tromethamine 4 MG/ML Ophthalmi

c Solution ketorolac 0.4 % eye drops 

STARTING 1 DAY PRIOR TO SURGERY PLACE 1 DROP IN THE RIGHT EYE FOUR TIMES A DAY 
THEN TAPER AS DIRECTED                  ketorolac 0.4 % eye drops STARTING 1 DAY

 PRIOR TO SURGERY

PLACE 1 DROP IN THE RIGHT EYE FOUR TIMES A DAY THEN TAPER AS DIRECTED           

                                             

             completed                              ketorolac tromethamine 4 MG/

ML Ophthalmic Solution [facility] ([facility])

 

                                        Flucelvax Quad [id number] 60 mcg (15 mcg 

x 4)/0.5 mL intramuscular susp INJECT 

0.5ML INTRAMUSCULARLY [id number]                                     completed      

       influenza A virus 

A/ (H1N1) antigen 0.03 MG/ML / influenza A virus A/ 
(H3N2) antigen 0.03 MG/ML / influenza B virus B/ antigen 0.03 MG/ML 
/ influenza B virus B/[facility]-/2016 antigen 0.03 MG/ML Injectable
Suspension [Flucelvax Quadrivalent [id number]] SA[facility] ([facility])

 

                                        Ciprofloxacin 3 MG/ML Ophthalmic Solutio

n ciprofloxacin 0.3 % eye drops STARTING

1 DAY PRIOR TO SURGERY PLACE 1 DROP IN THE RIGHT EYE FOUR TIMES A DAY THEN 
CONTINUE FOR 1 WEEK                     ciprofloxacin 0.3 % eye drops STARTING 1

 DAY PRIOR TO 

SURGERY PLACE 1 DROP IN THE RIGHT EYE FOUR TIMES A DAY THEN CONTINUE FOR 1 WEEK 

                                          completed               ciprofloxacin 

3 MG/ML Ophthalmic Solution [facility] ([facility])

 

                                        Ergocalciferol 04071 UNT Oral Capsule Vi

tamin D2 1,250 mcg (50,000 unit) capsule

TAKE ONE CAPSULE BY MOUTH WEEKLY        Vitamin D2 1,250 mcg (50,000 unit) capsu

le TAKE

ONE CAPSULE BY MOUTH WEEKLY                                           completed 

              ergocalciferol 1.25 MG 

Oral Capsule                            [facility] ([facility])

 

                                        travoprost 0.04 MG/ML Ophthalmic Solutio

n [Travatan] Travatan Z 0.004 % eye 

drops INSTILL 1 DROP IN EACH EYE IN THE EVENING Travatan Z 0.004 % eye drops 

INSTILL 1 DROP IN EACH EYE IN THE EVENING                                       

    completed               travoprost 

0.04 MG/ML Ophthalmic Solution [Travatan] [facility] ([facility])

 

                                        Ciprofloxacin 3 MG/ML Ophthalmic Solutio

n ciprofloxacin 0.3 % eye drops STARTING

 1 DAY PRIOR TO SURGERY PLACE 1 DROP IN THE RIGHT EYE FOUR TIMES A DAY THEN 
CONTINUE FOR 1 WEEK                     ciprofloxacin 0.3 % eye drops STARTING 1

 DAY PRIOR TO 

SURGERY PLACE 1 DROP IN THE RIGHT EYE FOUR TIMES A DAY THEN CONTINUE FOR 1 WEEK 

                                          completed               ciprofloxacin 

3 MG/ML Ophthalmic Solution [facility] ([facility])

 

                                        prednisolone acetate 10 MG/ML Ophthalmic

 Suspension prednisolone acetate 1 % eye

 drops,suspension INSTILL ONE DROP IN THE RIGHT EYE FOUR TIMES A DAY FOR 1 WEEK 
THEN TAPER AS DIRECTED                  prednisolone acetate 1 % eye drops,suspe

nsion INSTILL ONE

 DROP IN THE RIGHT EYE FOUR TIMES A DAY FOR 1 WEEK THEN TAPER AS DIRECTED       

                                          

                              completed                     prednisolone acetate

 10 MG/ML Ophthalmic Suspension [facility] 

([facility])

 

                                        200 ACTUAT Albuterol 0.09 MG/ACTUAT Mete

red Dose Inhaler [ProAir] ProAir HFA 90 

mcg/actuation aerosol inhaler INHALE TWO PUFFS BY MOUTH EVERY 6 HOURS AS NEEDED 

ProAir HFA 90 mcg/actuation aerosol inhaler INHALE TWO PUFFS BY MOUTH EVERY 6 
HOURS AS NEEDED                                           completed             

  [id number] 200 ACTUAT albuterol 0.09 

MG/ACTUAT Metered Dose Inhaler [ProAir] [facility] ([facility])

 

                                        200 ACTUAT Albuterol 0.09 MG/ACTUAT Mete

red Dose Inhaler [ProAir] ProAir HFA 90 

mcg/actuation aerosol inhaler INHALE TWO PUFFS BY MOUTH EVERY 6 HOURS AS NEEDED 

ProAir HFA 90 mcg/actuation aerosol inhaler INHALE TWO PUFFS BY MOUTH EVERY 6 
HOURS AS NEEDED                                           completed             

  [id number] 200 ACTUAT albuterol 0.09 

MG/ACTUAT Metered Dose Inhaler [ProAir] [facility] ([facility])

 

                                        Ketorolac Tromethamine 4 MG/ML Ophthalmi

c Solution ketorolac 0.4 % eye drops 

STARTING 1 DAY PRIOR TO SURGERY PLACE 1 DROP IN THE RIGHT EYE FOUR TIMES A DAY 
THEN TAPER AS DIRECTED                  ketorolac 0.4 % eye drops STARTING 1 DAY

 PRIOR TO SURGERY

 PLACE 1 DROP IN THE RIGHT EYE FOUR TIMES A DAY THEN TAPER AS DIRECTED          

                                            

                      completed                        ketorolac tromethamine 4 

MG/ML Ophthalmic Solution [facility] ([facility])

 

                                        Ketorolac Tromethamine 4 MG/ML Ophthalmi

c Solution ketorolac 0.4 % eye drops 

STARTING 1 DAY PRIOR TO SURGERY PLACE 1 DROP IN THE RIGHT EYE FOUR TIMES A DAY 
THEN TAPER AS DIRECTED                  ketorolac 0.4 % eye drops STARTING 1 DAY

 PRIOR TO SURGERY

 PLACE 1 DROP IN THE RIGHT EYE FOUR TIMES A DAY THEN TAPER AS DIRECTED          

                                            

                      completed                        ketorolac tromethamine 4 

MG/ML Ophthalmic Solution [facility] ([facility]

 [facility])

 

                          Loperamide Hydrochloride 2 MG Oral Capsule loperamide 

2 mg capsule loperamide 2 

mg capsule                                     completed             loperamide 

hydrochloride 2 MG Oral Capsule 

[facility] ([facility])

 

                                        prednisolone acetate 10 MG/ML Ophthalmic

 Suspension prednisolone acetate 1 % eye

 drops,suspension INSTILL ONE DROP IN THE RIGHT EYE FOUR TIMES A DAY FOR 1 WEEK 
THEN TAPER AS DIRECTED                  prednisolone acetate 1 % eye drops,suspe

nsion INSTILL ONE

 DROP IN THE RIGHT EYE FOUR TIMES A DAY FOR 1 WEEK THEN TAPER AS DIRECTED       

                                          

                              completed                     prednisolone acetate

 10 MG/ML Ophthalmic Suspension [facility] 

([facility])

 

                                        12 HR CHLORPHENIRAMINE POLISTIREX 1.6 MG

/ML / HYDROCODONE POLISTIREX 2 MG/ML 

Extended Release Suspension hydrocodone 10 mg-chlorpheniramine 8 mg/5 mL oral 
susp extend.rel 12hr TAKE 5ML BY MOUTH EVERY 12 HOURS AS NEEDED FOR COUGH 
MAXIMUM DAILY DOSE   10ML               hydrocodone 10 mg-chlorpheniramine 8 mg/

5 mL oral susp

 extend.rel 12hr TAKE 5ML BY MOUTH EVERY 12 HOURS AS NEEDED FOR COUGH MAXIMUM 
DAILY DOSE   10ML                                           completed           

    12 HR chlorpheniramine polistirex 

1.6 MG/ML / hydrocodone polistirex 2 MG/ML Extended Release Suspension [facility] 

([facility])

 

                                        Flucelvax Quad 1311-0151 60 mcg (15 mcg 

x 4)/0.5 mL intramuscular susp INJECT 

0.5ML INTRAMUSCULARLY [id number]                                     completed      

       influenza A virus 

A/ (H1N1) antigen 0.03 MG/ML / influenza A virus A/ 
(H3N2) antigen 0.03 MG/ML / influenza B virus B/ antigen 0.03 MG/ML 
/ influenza B virus B/[facility]/ antigen 0.03 MG/ML Injectable
 Suspension [Flucelvax Quadrivalent [id number]] SAIDA ([facility])



                                                                                
                                                                                
                                                                                
                                                                                
                                                                                
                                                                                
                                                                                
                                                                                
                                                                                
                                                                                
                                                                                
                                                                                
                                                                                
                                                                                
                                                                                
                                                                                
                                                                                
                                                                                
                                                                                
                             



Insurance Providers

          



             Payer name   Policy type / Coverage type Policy ID    Covered party

 ID Covered 

party's relationship to mckeon Policy Mckeon             Plan Information

 

          [employer] RISK [employer]NAGEMENT           [id number]           WI2           

      [id number]

 

          [employer]               [id number]           W[place]2                 [id number]

 

          MEDICARE  A         [id number]           Self                [id number]

[id number]

 

          MEDICARE            [id number]           SP                  [id number]

[id number]

 

                [place]are [place] [id number]     

[id number] Self                                    

[id number]

 

          MEDICARE            [id number]           SP                  [id number]

A

 

                Med[place]are [place] Medicare Primary [id number]     

[id number] Self                                    

[id number]

 

                Med[place]are [place] Medicare Primary [id number]     

[id number] Self                                    

[id number]

 

                [place]are [place] Medicare Primary [id number]     

[id number] Self                                    

[id number]

 

          MEDICARE            [id number]           SP                  [id number]

[id number]

 

                [place]are [place] Primary [id number]     

[id number] Self                                    

[id number]

 

          Med[place]are [place] Medicare Primary           [id number]    Self            

     

 

          [employer] (pr) Medigap Part B           [id number]    Family Dependent        

    

 

          [facility]       U         [id number]           Spouse              [id number]

 

             [employer] (pr)   Medigap Part B [id number]    [id number]

.[id number] 

Family Dependent                                    [id number]

 

             [employer] (pr)   Medigap Part B [id number]    [id number]

.[id number] 

Family Dependent                                    [id number]

 

             [employer] (pr)   Medigap Part B [id number]    [id number]

.[id number] 

Family Dependent                                    [id number]

 

             [employer] (pr)   Medigap Part B [id number]    [id number]

.[id number] 

Family Dependent                                    [id number]

 

             [employer] (pr)   Medigap Part B [id number]    [id number]

.[id number] 

Family Dependent                                    [id number]

 

                Medicare [place] Medicare Primary [id number]      

[id number] Self                                    

[id number]

 

             [employer] (pr)   Medigap Part B [id number]    [id number]

.[id number] 

Family Dependent                                    [id number]

 

                Medicare [place] Medicare Primary [id number]      

[id number] Self                                    

[id number]

 

             [employer] (pr)   Medigap Part B [id number]    [id number]

.[id number] 

Family Dependent                                    [id number]

 

          [facility]       U         [id number]           Spouse              [id number]

 

          SELF PAY ONLY           [id number]           SP                  [id number]

[id number]

 

                [facility] (pr)        Medigap Part B  [id number]

[id number] 

[id number] Family Dependent                        

[id number]

 

          MEDICARE  [facility]       [id number] S                   [id number]

[id number]

 

          [facility]           [id number]           SP               

   [id number]

 

          [facility]       HEA       [id number] SP                  [id number]

 

                [facility] (pr)        Medigap Part B  [id number]

[id number] 

[id number] Family Dependent                        

[id number]

 

          MEDICARE  [employer]       [id number] S                   [id number]

[id number]

 

          [employer] PPO O         [id number] P                   [id number]

 

          MEDICARE  C         [id number] S                   [id number]

A

 

          Medicare Part B [facility] Other     0         [id number] 

Self                0

 

          [employer] Other     0         [id number] Family Dep

endent Torri Lowryhm 0

 

             Umr          Commercial   [id number]  [id number]

[id number] Family 

Dependent                                           [id number]

 

                Medicare [employer]/[employer] Medicare Primary [id number]     

[id number] Self                                    

[id number]

 

             [employer]        Medigap Part B [id number]    [id number]

.[id number] Family 

Dependent                                           [id number]

 

                Medicare [employer]/[employer] Medicare Primary [id number]      

[id number] Self                                    

[id number]

 

          Medicare Part B [facility] Other     0         [id number] S

elf                0

 

          [employer]               [id number]           WI2                 [id number]

 

          [facility]           [id number]           WI2              

   [id number]

 

          MEDICARE PART A             -O/P           [id number]           18   

               [id number]

 

          [facility]                         -O/P           [id number]           [id number]     

             [id number]

 

          [employer] Other     0         [id number] Family Dep

endent Torri Ivory 0

 

          Medicare Part B [facility] Other     0         [id number] 

Self                0

 

                Um[facility] (pr)        Medigap Part B  [id number]

[id number] 

[id number] Family Dependent                        

[id number]

 

          [facility]       O         [id number] P                   [id number]

 

                Umr (pr)        Medigap Part B  [id number]

[id number] 

[id number] Family Dependent                        

[id number]

 

          MEDICARE  C         [id number] S                   [id number]

[id number]

 

             Umr          Commercial   [id number]  [id number]

[id number] Family 

Dependent                                           [id number]

 

                Medicare [employer]/[employer] Medicare Primary [id number]     

[id number] Self                                    

[id number]

 

                Um[facility] (pr)        Medigap Part B  [id number]

[id number] 

[id number] Family Dependent                        

[id number]

 

          [facility]       O         [id number] S                   [id number]



                                                                                
                                                                                
                                                                                
                                                                                
                                                                                
                                                                                
                                                                                
                                                   



Problems, Conditions, and Diagnoses

          No Information                                                        
                               



Surgeries/Procedures

          



             Procedure    Description  Date         Indications  Data Source(s)

 

             OFFICE OUTPATIENT VISIT 25 MINUTES              2021 12:00:00

 AM EDT              [facility] 

([facility])

 

                    Excise Malig Lesion  .6-1CM Face/Ear/Eyelid/Nose/Lip        

             2021 12:00:00 AM 

EDT                                                 [facility] ([facility]

actice, )

 

             Shave Biopsy Of Skin, Single Lesion              2021 12:00:0

0 AM EDT              [facility] 

([facility])

 

             OFFICE OUTPATIENT VISIT 25 MINUTES              2021 12:00:00

 AM EDT              [facility] 

([facility])

 

                    History finding (finding) No significant past surgical histo

ry 2021 

12:00:00 AM EDT                                     [facility] ([facility]avin Munguia MD [facility])

 

                    Duplex Scan Aorta/Inf Vena Cava/Iliac Vasc/Bypass [facility]/F

ol-Up                     2021 

12:00:00 AM EDT                                     [facility] ([facility])

 

             OFFICE OUTPATIENT VISIT 15 MINUTES              2021 12:00:00

 AM EDT              [facility] 

([facility])

 

             THERAPEUTIC PX 1/> AREAS EACH 15 MIN EXERCISES              

021 12:00:00 AM EDT              

[facility] ([facility] )

 

             Physical Therapy Eval - Low Complexity              2021 12:0

0:00 AM EDT              [facility] 

([facility] )

 

             ARTHROCENTESIS ASPIR&/INJECTION MAJOR JT/BURSA              

021 12:00:00 AM EDT              

[facility] ([facility])

 

             RADEX SHOULDER COMPLETE MINIMUM 2 VIEWS              2021 12:

00:00 AM EDT              [facility] 

([facility])

 

             DESTRUCTION PREMALIGNANT LESION 1ST              2021 12:00:0

0 AM EDT              [facility] 

([facility])

 

             DESTRUCTION PREMALIGNANT LESION 2-14 EA              2021 12:

00:00 AM EDT              [facility] 

([facility])



                                                                                
                                                                                
                                              



Results

          



                    ID                  Date                Data Source

 

                    [id number]         2021 01:35:00 PM EDT [facility] ([facility], )









          Name      Value     Range     Interpretation Code Description Data Sejal

rce(s) Supporting 

Document(s)

 

           Surgical pathology study Laboratory test result                      

            [facility] ([facility])                            

 

                                        FINAL DIAGNOSIS



Left cheek, lesion, excision:

Skin with a few prominent hair follicles and actinic damage.

No malignancy is identified.

                                        2021 - 1105



CLINICAL DIAGNOSIS



SCC left cheek

                                        2021 - 1302



GROSS DIAGNOSIS



Received in formalin labeled "left cheek" is a 0.9 x 0.4 x 0.4 cm.

ellipsoid portion of skin.  It is inked, bisected and submitted all in

one.

                                        -

                                        2021 - 1302



Signed________ Bay Sierra MD 2021 1220

 









                    ID                  Date                Data Source

 

                    [id number]              2021 04:07:00 PM EDT [facility] ([facility])









          Name      Value     Range     Interpretation Code Description Data Sejal

rce(s) Supporting 

Document(s)

 

           Laboratory test finding (navigational concept) Laboratory test result

                                  

[facility] ([facility])    

 

                                        Refer Dr Jameson letter awaiting signatu

re LW 21 photo emailed, letter sent

awaiting appt lw 21 wife called inquiring on appt with Dr Jameson, I spoke 
with Devika and Dr Jameson is out for 2 weeks and Mervin is out this week.  She 
will call his wife and get him scheduled  LW 21 Patient's wife came into 
the office today and wanted referral resent to Dr Zayas and he is scheduled 
for 21 at 11:10, letter redone awaiting signature LW 21 need to cancel 
referral with Dr Jameson photo emailed, letter sent to Dr Zayas,  LW 21 

 

           Laboratory test finding (navigational concept) Laboratory test result

                                  

[facility] ([facility])    

 

                                        Refer Dr Jameson letter awaiting signatu

re LW 21 photo emailed, letter sent

awaiting appt lw 21 wife called inquiring on appt with Dr Jameson, I spoke 
with Devika and Dr Jameson is out for 2 weeks and Mervin is out this week.  She 
will call his wife and get him scheduled  LW 21 Patient's wife came into 
the office today and wanted referral resent to Dr Zayas and he is scheduled 
for 21 at 11:10, letter redone awaiting signature LW 21 need to cancel 
referral with Dr Jameson photo emailed, letter sent to Dr Zayas,  LW 21 









                    ID                  Date                Data Source

 

                    [id number]              2021 04:07:00 PM EDT [facility] ([facility])









          Name      Value     Range     Interpretation Code Description Data Sejal

rce(s) Supporting 

Document(s)

 

           Laboratory test finding (navigational concept) Laboratory test result

                                  

[facility] ([facility])    









                    ID                  Date                Data Source

 

                    [id number]            2021 05:46:00 PM EDT [facility]

 

                                        DATE OF EXAM: BDOMEN SINGLE V

IEW INDICATION: Incontinence. TECHNIQUE:

Two supine films of the abdomen were obtained. FINDINGS: Gas is seen diffusely 
throughout nondilated loops of large and small bowel.  There are no dilated 
loops of bowel.  No abnormal abdominal calcifications are present.  Prostate 
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  The lung bases
are clear.  Scoliosis and lumbar spine degenerative changes are present. 
IMPRESSION: Nonobstructive bowel gas pattern. Professional interpretation 
performed at [facility] [phone].End of diagnostic report for 
accession:  [id number] Interpreted: Denise Peter MDTranscribed: 2021
05:45 PMSigned:      2021 05:46 PM  Denise Peter MD    
-------------------------------------
-------------------------------------------MRN:  [id number] [facility] #  
[id number] [facility] # [id number] 









          Name      Value     Range     Interpretation Code Description Data Sejal

rce(s) Supporting 

Document(s)

 

                                                                       









                    ID                  Date                Data Source

 

                    [id number] 2020 12:00:00 AM 

EDT SAIDA ([facility] 

[facility])









          Name      Value     Range     Interpretation Code Description Data Sejal

rce(s) Supporting 

Document(s)

 

                                        SARS coronavirus 2 RNA [Presence] in Res

piratory specimen by JANI with probe 

detection  negative   negative              Sars-cov-2 SAIDA ([facility]) 

 









                    ID                  Date                Data Source

 

                    [id number] 2020 12:00:00 AM 

EDT SAIDA ([facility])









          Name      Value     Range     Interpretation Code Description Data Sejal

rce(s) Supporting 

Document(s)

 

                                                                       









                    ID                  Date                Data Source

 

                    [id number] 2020 12:00:00 AM 

EDT SAIDA ([facility] 

[facility])









          Name      Value     Range     Interpretation Code Description Data Sejal

rce(s) Supporting 

Document(s)

 

                                                                       









                    ID                  Date                Data Source

 

                    [id number] 2020 12:00:00 AM 

EDT SAIDA ([facility])









          Name      Value     Range     Interpretation Code Description Data Sejal

rce(s) Supporting 

Document(s)

 

                                        SARS coronavirus 2 RNA [Presence] in Res

piratory specimen by JANI with probe 

detection  negative   negative              Sars-cov-2 SAIDA ([facility]) 

 









                    ID                  Date                Data Source

 

                    [id number] 2020 12:00:00 AM 

EDT SAIDA ([facility] 

[facility])









          Name      Value     Range     Interpretation Code Description Data Sejal

rce(s) Supporting 

Document(s)

 

                                                                       









                    ID                  Date                Data Source

 

                    [id number] 2020 12:00:00 AM 

EDT SAIDA ([facility] 

[facility])









          Name      Value     Range     Interpretation Code Description Data Sejal

rce(s) Supporting 

Document(s)

 

                                                                       









                    ID                  Date                Data Source

 

                    [id number]            2020 12:00:00 AM EDT [facility]









          Name      Value     Range     Interpretation Code Description Data Sejal

rce(s) Supporting 

Document(s)

 

          SARS-CoV-2                                         [facility]     

 

                                        This lab was ordered by [facility] and reported by 

[employer]. 









                    ID                  Date                Data Source

 

                    [id number] 2020 12:00:00 AM 

EDT SAIDA ([facility] 

[facility])









          Name      Value     Range     Interpretation Code Description Data Sejal

rce(s) Supporting 

Document(s)

 

                                                                       









                    ID                  Date                Data Source

 

                    [id number] 2020 12:00:00 AM 

EDT SAIDA ([facility] 

[facility])









          Name      Value     Range     Interpretation Code Description Data Sejal

rce(s) Supporting 

Document(s)

 

                                                                       









                    ID                  Date                Data Source

 

                    [id number] 2020 12:00:00 AM 

EDT SAIDA ([facility] 

[facility])









          Name      Value     Range     Interpretation Code Description Data Sejal

rce(s) Supporting 

Document(s)

 

                                                                       









                    ID                  Date                Data Source

 

                    [id number] 2020 12:00:00 AM 

EDT SAIDA ([facility] 

[facility])









          Name      Value     Range     Interpretation Code Description Data Sejal

rce(s) Supporting 

Document(s)

 

                                                                       









                    ID                  Date                Data Source

 

                    [id number]            2020 12:00:00 AM EDT [facility]









          Name      Value     Range     Interpretation Code Description Data Sejal

rce(s) Supporting 

Document(s)

 

          SARS-CoV-2                                         [facility]     

 

                                        This lab was ordered by [facility] and reported by 

[employer]. 









                    ID                  Date                Data Source

 

                    [id number] 2020 12:00:00 AM 

EDT SAIDA ([facility] 

[facility])









          Name      Value     Range     Interpretation Code Description Data Sejal

rce(s) Supporting 

Document(s)

 

                                                                       









                    ID                  Date                Data Source

 

                    [id number] 2020 12:00:00 AM 

EDT SAIDA ([facility] 

[facility])









          Name      Value     Range     Interpretation Code Description Data Sejal

rce(s) Supporting 

Document(s)

 

                                                                       









                    ID                  Date                Data Source

 

                    [id number] 2020 12:00:00 AM 

EDT SAIDA ([facility] 

[facility])









          Name      Value     Range     Interpretation Code Description Data Sejal

rce(s) Supporting 

Document(s)

 

                                                                       









                    ID                  Date                Data Source

 

                    [id number] 2020 12:00:00 AM 

EDT SAIDA ([facility] 

[facility])









          Name      Value     Range     Interpretation Code Description Data Sejal

rce(s) Supporting 

Document(s)

 

                                                                       









                    ID                  Date                Data Source

 

                    [id number] 2020 12:00:00 AM 

EDT SAIDA ([facility] 

[facility])









          Name      Value     Range     Interpretation Code Description Data Sejal

rce(s) Supporting 

Document(s)

 

                                                                       









                    ID                  Date                Data Source

 

                    [id number] 2020 12:00:00 AM 

EDT SAIDA ([facility] 

[facility])









          Name      Value     Range     Interpretation Code Description Data Sejal

rce(s) Supporting 

Document(s)

 

                                                                       









                    ID                  Date                Data Source

 

                    [id number]            2020 12:00:00 AM EDT [facility]









          Name      Value     Range     Interpretation Code Description Data Sejal

rce(s) Supporting 

Document(s)

 

          SARS-CoV-2                                         [facility]     

 

                                        This lab was ordered by [facility] and reported by 

[employer]. 







                                        Procedure

 

                                          



                                                                                
                                                                                
                                                                                
                                                                              



Social History

          No Information                                                        
                                



Vital Signs

          



                    ID                  Date                Data Source

 

                    UNK                                      









           Name       Value      Range      Interpretation Code Description Data

 Source(s)

 

           Systolic blood pressure 151 mm[Hg]                       151 mm[Hg] M

EDENT ([facility]

 

           Diastolic blood pressure 77 mm[Hg]                        77 mm[Hg]  

MEDENT ([facility])

 

           Heart rate 61 /min                          61 /min    ALEXEY ([facility]

 

           Body weight 150.00 [lb_av]                       150.00 [lb_av] MEDEN

T ([facility])

 

           Ideal body weight 172 [lb_av]                       172 [lb_av] MEDEN

T ([facility])

 

           Body weight 69.401 kg                        69.401 [facility]  [facility]

 

           Body surface area Derived from formula 1.88 m2                       

   1.88 m2    [facility] ([facility])

 

           Systolic blood pressure 138 mm[Hg]                       138 mm[Hg] M

EDENT ([facility])

 

           Diastolic blood pressure 66 mm[Hg]                        66 mm[Hg]  

MEDENT ([facility])

 

           Body temperature 98.9 [degF]                       98.9 [degF] [facility]

 ([facility])

 

           Body height 71 [in_i]                        71 [in_i]  [facility] ([facility])

 

                                        5'11" 

 

           Body weight 153.00 [lb_av]                       153.00 [lb_av] MEDEN

T ([facility])

 

           Body mass index (BMI) [Ratio] 21.3 kg/m2                       21.3 k

g/m2 [facility] ([facility])

 

           Systolic blood pressure 173 mm[Hg]                       173 mm[Hg] [facility]

 

           Diastolic blood pressure 62 mm[Hg]                        62 mm[Hg]  

[facility] ([facility])

 

           Body temperature 98.4 [degF]                       98.4 [degF] [facility]

 ([facility])

 

           Body height 71 [in_i]                        71 [in_i]  [facility] ([facility])

 

                                        5'11" 

 

           Body mass index (BMI) [Ratio] 21.5 kg/m2                       21.5 k

g/m2 [facility] ([facility])

 

           Ideal body weight 172 [lb_av]                       172 [lb_av] MEDEN

T ([facility])

 

           Body weight 69.854 kg                        69.854 kg  [facility] ([facility])

 

           Body surface area Derived from formula 1.89 m2                       

   1.89 m2    [facility] ([facility])

 

           Body weight 154.00 [lb_av]                       154.00 [lb_av] MEDEN

T ([facility]

 

           Body weight 150.00 [lb_av]                       150.00 [lb_av] MEDEN

T ([facility]

 

           Respiratory rate 17 /min                          17 /min    MEDENT (

[facility])

 

           Systolic blood pressure 148 mm[Hg]                       148 mm[Hg] M

EDENT ([facility])

 

           Diastolic blood pressure 78 mm[Hg]                        78 mm[Hg]  

MEDENT ([facility]

 

           Body weight 150.00 [lb_av]                       150.00 [lb_av] MEDEN

T ([facility]

 

           Respiratory rate 17 /min                          17 /min    MEDENT (

[facility])

 

           Systolic blood pressure 150 mm[Hg]                       150 mm[Hg] M

EDENT ([facility])

 

           Diastolic blood pressure 60 mm[Hg]                        60 mm[Hg]  

MEDENT ([facility]

 

           Body temperature 96.8 [degF]                       96.8 [degF] MEDENT

 ([facility])

 

           Body height 68.5 [in_i]                       68.5 [in_i] MEDENT ([facility] )

 

                                        5'8.50" 

 

           Body temperature 97.3 [degF]                       97.3 [degF] MEDENT

 ([facility] 

)

 

           Body mass index (BMI) [Ratio] 21.6 kg/m2                       21.6 k

g/m2 MEDENT ([facility] )

 

           Body weight 144.50 [lb_av]                       144.50 [lb_av] MEDEN

T ([facility] 

)

 

           Body temperature 96.8 [degF]                       96.8 [degF] MEDENT

 ([facility]

 

           Systolic blood pressure 151 mm[Hg]                       151 mm[Hg] M

EDENT ([facility])

 

           Body weight 155.00 [lb_av]                       155.00 [lb_av] MEDEN

T ([facility]

 

           Diastolic blood pressure 66 mm[Hg]                        66 mm[Hg]  

MEDENT ([facility]

 

           Diastolic blood pressure 62 mm[Hg]                        62 mm[Hg]  

SAIDA ([facility])

 

           Body height 72 [in_i]                        72 [in_i]  SAIDA ([facility])

 

           Systolic blood pressure 148 mm[Hg]                       148 mm[Hg] A

THENA ([facility])

 

           Body height 72 [in_i]                        72 [in_i]  SAIDA ([facility])

 

           Body height 72 [in_i]                        72 [in_i]  SAIDA ([facility]

 

           Body height 72 [in_i]                        72 [in_i]  SAIDA ([facility]

 

           Body mass index (BMI) [Ratio] 20.9 kg/m2                       20.9 k

g/m2 SAIDA ([facility])

 

           Diastolic blood pressure 61 mm[Hg]                        61 mm[Hg]  

SAIDA ([facility]

 

           Systolic blood pressure 150 mm[Hg]                       150 mm[Hg] A

THENA ([facility])

 

           Body weight 154 [lb_av]                       154 [lb_av] SAIDA ([facility])

 

           Diastolic blood pressure 61 mm[Hg]                        61 mm[Hg]  

SAIDA ([facility]

 

           Body mass index (BMI) [Ratio] 20.9 kg/m2                       20.9 k

g/m2 SAIDA ([facility])

 

           Systolic blood pressure 150 mm[Hg]                       150 mm[Hg] A

THENA ([facility]

 

           Body weight 154 [lb_av]                       154 [lb_av] SAIDA ([facility])

 

           Systolic blood pressure 150 mm[Hg]                       150 mm[Hg] A

THENA ([facility]

 

           Body weight 154 [lb_av]                       154 [lb_av] SAIDA ([facility])

 

           Diastolic blood pressure 61 mm[Hg]                        61 mm[Hg]  

SAIDA ([facility]

 

           Body height 72 [in_i]                        72 [in_i]  SAIDA ([facility])

 

           Body mass index (BMI) [Ratio] 20.9 kg/m2                       20.9 k

g/m2 SAIDA ([facility]

 

           Diastolic blood pressure 61 mm[Hg]                        61 mm[Hg]  

SAIDA ([facility]

 

           Body height 72 [in_i]                        72 [in_i]  SAIDA ([facility])

 

           Body mass index (BMI) [Ratio] 20.9 kg/m2                       20.9 k

g/m2 SAIDA ([facility]

 

           Systolic blood pressure 150 mm[Hg]                       150 mm[Hg] A

THENA ([facility])

 

           Body weight 154 [lb_av]                       154 [lb_av] SAIDA ([facility])

 

           Body height 72 [in_i]                        72 [in_i]  SAIDA ([facility])

 

           Systolic blood pressure 150 mm[Hg]                       150 mm[Hg] A

THENA ([facility]

 

           Body weight 154 [lb_av]                       154 [lb_av] SAIDA ([facility])

 

           Diastolic blood pressure 61 mm[Hg]                        61 mm[Hg]  

SAIDA ([facility]

 

           Body height 72 [in_i]                        72 [in_i]  SAIDA ([facility])

 

           Body mass index (BMI) [Ratio] 20.9 kg/m2                       20.9 k

g/m2 SAIDA ([facility]

 

           Diastolic blood pressure 61 mm[Hg]                        61 mm[Hg]  

SAIDA ([facility]

 

           Body height 72 [in_i]                        72 [in_i]  SAIDA ([facility]

 

           Body mass index (BMI) [Ratio] 20.9 kg/m2                       20.9 k

g/m2 SAIDA ([facility]

 

           Systolic blood pressure 150 mm[Hg]                       150 mm[Hg] A

THENA ([facility]

 

           Body weight 154 [lb_av]                       154 [lb_av] SAIDA ([facility])

 

           Diastolic blood pressure 61 mm[Hg]                        61 mm[Hg]  

SAIDA ([facility]

 

           Body height 72 [in_i]                        72 [in_i]  SAIDA ([facility]

 

           Body mass index (BMI) [Ratio] 20.9 kg/m2                       20.9 k

g/m2 SAIDA ([facility]

 

           Systolic blood pressure 150 mm[Hg]                       150 mm[Hg] A

THENA ([facility]

 

           Body weight 154 [lb_av]                       154 [lb_av] SAIDA ([facility])

 

           Diastolic blood pressure 61 mm[Hg]                        61 mm[Hg]  

SAIDA ([facility]

 

           Body height 72 [in_i]                        72 [in_i]  SAIDA ([facility])

 

           Body mass index (BMI) [Ratio] 20.9 kg/m2                       20.9 k

g/m2 SAIDA ([facility])

 

           Systolic blood pressure 150 mm[Hg]                       150 mm[Hg] A

THENA ([facility])

 

           Body weight 154 [lb_av]                       154 [lb_av] SAIDA ([facility])



                                                                                
                  



Patient Treatment Plan of Care

          



             Planned Activity Planned Date Details      Description  Data Source

(s)

 

                          Brimonidine tartrate 2 MG/ML / Timolol 5 MG/ML Ophthal

jesika Solution [Combigan] 

2021 12:00:00 AM EDT                                         FERN (Lino Munguia MD [facility])

 

                    travoprost 0.04 MG/ML Ophthalmic Solution [Travatan] 

021 12:00:00 AM EDT 

                                                    FERN (Gavin Munguia MD [facility])

 

                          Brimonidine tartrate 2 MG/ML / Timolol 5 MG/ML Ophthal

jesika Solution [Combigan] 

2021 12:00:00 AM EDT                                         FERN (Lino Munguia MD [facility])

 

                          Brimonidine tartrate 2 MG/ML / Timolol 5 MG/ML Ophthal

jesika Solution [Combigan] 

06/15/2021 12:00:00 AM EDT                                         FERN (Lino Munguia MD [facility])

 

                          Brimonidine tartrate 2 MG/ML / Timolol 5 MG/ML Ophthal

jesika Solution [Combigan] 

05/10/2021 12:00:00 AM EDT                                         FERN (Lino Munguia MD [facility])

 

                          Brimonidine tartrate 2 MG/ML / Timolol 5 MG/ML Ophthal

jesika Solution [Combigan] 

2020 12:00:00 AM EDT                                         FERN (Lino Munguia MD [facility])

 

                    travoprost 0.04 MG/ML Ophthalmic Solution [Travatan] 

019 12:00:00 AM EST 

                                                    FERN (Gavin Munguia MD [facility])

 

             travoprost 0.04 MG/ML Ophthalmic Solution [Travatan]               

                         SAIDA ([facility] 

[facility])

 

             200 ACTUAT Albuterol 0.09 MG/ACTUAT Metered Dose Inhaler [ProAir]  

                                      SAIDA 

([facility])

 

             prednisolone acetate 10 MG/ML Ophthalmic Suspension                

                        SAIDA ([facility])

 

             Loperamide Hydrochloride 2 MG Oral Capsule                         

               SAIDA ([facility])

 

             Ketorolac Tromethamine 4 MG/ML Ophthalmic Solution                 

                       SAIDA ([facility])

 

                                        12 HR CHLORPHENIRAMINE POLISTIREX 1.6 MG

/ML / HYDROCODONE POLISTIREX 2 MG/ML 

Extended Release Suspension                                                 ATHE

NA ([facility])

 

             gabapentin 300 MG Oral Capsule                                     

   SAIDA ([facility])

 

                                        Flucelvax Quad [id number] 60 mcg (15 mcg 

x 4)/0.5 mL intramuscular susp INJECT 

0.5ML INTRAMUSCULARLY                                                 SAIDA ([facility]

[facility])

 

             Ciprofloxacin 3 MG/ML Ophthalmic Solution                          

              SAIDA ([facility])

 

             Cholecalciferol 05141 UNT Oral Capsule                             

           SAIDA ([facility])

 

             travoprost 0.04 MG/ML Ophthalmic Solution [Travatan]               

                         SAIDA ([facility])

 

             200 ACTUAT Albuterol 0.09 MG/ACTUAT Metered Dose Inhaler [ProAir]  

                                      SAIDA 

([facility])

 

             prednisolone acetate 10 MG/ML Ophthalmic Suspension                

                        [facility] ([facility]

 [facility])

 

             Loperamide Hydrochloride 2 MG Oral Capsule                         

               [facility] ([facility])

 

             Ketorolac Tromethamine 4 MG/ML Ophthalmic Solution                 

                       [facility] ([facility] 

[facility])

 

                                        12 HR CHLORPHENIRAMINE POLISTIREX 1.6 MG

/ML / HYDROCODONE POLISTIREX 2 MG/ML 

Extended Release Suspension                                                 ATHE

NA ([facility])

 

             gabapentin 300 MG Oral Capsule                                     

   [facility] ([facility])

 

                                        Flucelvax Quad 3717-6142 60 mcg (15 mcg 

x 4)/0.5 mL intramuscular susp INJECT 

0.5ML INTRAMUSCULARLY                                                 [facility] ([facility]

[facility])

 

             Ciprofloxacin 3 MG/ML Ophthalmic Solution                          

              [facility] ([facility])

 

             Cholecalciferol 36270 UNT Oral Capsule                             

           [facility] ([facility])

 

             travoprost 0.04 MG/ML Ophthalmic Solution [Travatan]               

                         [facility] ([facility])

 

             200 ACTUAT Albuterol 0.09 MG/ACTUAT Metered Dose Inhaler [ProAir]  

                                      [facility] 

([facility])

 

             prednisolone acetate 10 MG/ML Ophthalmic Suspension                

                        [facility] ([facility]

 [facility])

 

             Loperamide Hydrochloride 2 MG Oral Capsule                         

               [facility] ([facility])

 

             Ketorolac Tromethamine 4 MG/ML Ophthalmic Solution                 

                       [facility] ([facility] 

[facility])

 

                                        12 HR CHLORPHENIRAMINE POLISTIREX 1.6 MG

/ML / HYDROCODONE POLISTIREX 2 MG/ML 

Extended Release Suspension                                                 ATHE

NA ([facility])

 

             gabapentin 300 MG Oral Capsule                                     

   [facility] ([facility])

 

                                        Flucelvax Quad 6822-7746 60 mcg (15 mcg 

x 4)/0.5 mL intramuscular susp INJECT 

0.5ML INTRAMUSCULARLY                                                 [facility] ([facility]

[facility])

 

             prednisolone acetate 10 MG/ML Ophthalmic Suspension                

                        [facility] ([facility]

 [facility])

 

             Loperamide Hydrochloride 2 MG Oral Capsule                         

               [facility] ([facility])

 

             Ketorolac Tromethamine 4 MG/ML Ophthalmic Solution                 

                       [facility] ([facility] 

[facility])

 

                                        12 HR CHLORPHENIRAMINE POLISTIREX 1.6 MG

/ML / HYDROCODONE POLISTIREX 2 MG/ML 

Extended Release Suspension                                                 ATHE

NA ([facility])

 

             gabapentin 300 MG Oral Capsule                                     

   [facility] ([facility])

 

                                        Flucelvax Quad 5522-7385 60 mcg (15 mcg 

x 4)/0.5 mL intramuscular susp INJECT 

0.5ML INTRAMUSCULARLY                                                 [facility] ([facility]

[facility])

 

             Ciprofloxacin 3 MG/ML Ophthalmic Solution                          

              [facility] ([facility])

 

             Cholecalciferol 36764 UNT Oral Capsule                             

           [facility] ([facility])

 

             Ergocalciferol 04602 UNT Oral Capsule                              

          [facility] ([facility])

 

             travoprost 0.04 MG/ML Ophthalmic Solution [Travatan]               

                         [facility])

 

             200 ACTUAT Albuterol 0.09 MG/ACTUAT Metered Dose Inhaler [ProAir]  

                                      [facility] 

([facility])

 

             prednisolone acetate 10 MG/ML Ophthalmic Suspension                

                        [facility] ([facility]

 [facility])

 

             Loperamide Hydrochloride 2 MG Oral Capsule                         

               [facility] ([facility])

 

             Ketorolac Tromethamine 4 MG/ML Ophthalmic Solution                 

                       [facility] ([facility] 

[facility])

 

                                        12 HR CHLORPHENIRAMINE POLISTIREX 1.6 MG

/ML / HYDROCODONE POLISTIREX 2 MG/ML 

Extended Release Suspension                                                 ATHE

NA ([facility])

 

             gabapentin 300 MG Oral Capsule                                     

   [facility] ([facility])

 

                                        Flucelvax Quad 0014-4768 60 mcg (15 mcg 

x 4)/0.5 mL intramuscular susp INJECT 

0.5ML INTRAMUSCULARLY                                                 [facility] ([facility]

[facility])

 

             Ciprofloxacin 3 MG/ML Ophthalmic Solution                          

              [facility] ([facility])

 

             Cholecalciferol 01230 UNT Oral Capsule                             

           [facility] ([facility])

 

             Ergocalciferol 68482 UNT Oral Capsule                              

          [facility] ([facility])

 

             travoprost 0.04 MG/ML Ophthalmic Solution [Travatan]               

                         [facility] ([facility])

 

             200 ACTUAT Albuterol 0.09 MG/ACTUAT Metered Dose Inhaler [ProAir]  

                                      [facility] 

([facility])

 

             prednisolone acetate 10 MG/ML Ophthalmic Suspension                

                        [facility] ([facility]

 [facility])

 

             Loperamide Hydrochloride 2 MG Oral Capsule                         

               [facility] ([facility])

 

             Ketorolac Tromethamine 4 MG/ML Ophthalmic Solution                 

                       [facility] ([facility] 

[facility])

 

                                        12 HR CHLORPHENIRAMINE POLISTIREX 1.6 MG

/ML / HYDROCODONE POLISTIREX 2 MG/ML 

Extended Release Suspension                                                 ATHE

NA ([facility])

 

             gabapentin 300 MG Oral Capsule                                     

   [facility] ([facility])

 

                                        Flucelvax Quad 9553-4360 60 mcg (15 mcg 

x 4)/0.5 mL intramuscular susp INJECT 

0.5ML INTRAMUSCULARLY                                                 [facility] ([facility]

[facility])

 

             Ciprofloxacin 3 MG/ML Ophthalmic Solution                          

              [facility] ([facility])

 

             Cholecalciferol 06323 UNT Oral Capsule                             

           [facility] ([facility])

 

             Ergocalciferol 49756 UNT Oral Capsule                              

          [facility] ([facility])

 

             travoprost 0.04 MG/ML Ophthalmic Solution [Travatan]               

                         [facility] ([facility])

 

             200 ACTUAT Albuterol 0.09 MG/ACTUAT Metered Dose Inhaler [ProAir]  

                                      [facility] 

([facility])

 

             prednisolone acetate 10 MG/ML Ophthalmic Suspension                

                        [facility] ([facility]

 [facility])

 

             Loperamide Hydrochloride 2 MG Oral Capsule                         

               [facility] ([facility])

 

             Ketorolac Tromethamine 4 MG/ML Ophthalmic Solution                 

                       [facility] ([facility] 

[facility])

 

                                        12 HR CHLORPHENIRAMINE POLISTIREX 1.6 MG

/ML / HYDROCODONE POLISTIREX 2 MG/ML 

Extended Release Suspension                                                 ATHE

NA ([facility])

 

             gabapentin 300 MG Oral Capsule                                     

   [facility] ([facility])

 

                                        Flucelvax Quad 6177-5124 60 mcg (15 mcg 

x 4)/0.5 mL intramuscular susp INJECT 

0.5ML INTRAMUSCULARLY                                                 [facility] ([facility]

[facility])

 

             Ciprofloxacin 3 MG/ML Ophthalmic Solution                          

              [facility] ([facility])

 

             Cholecalciferol 54986 UNT Oral Capsule                             

           [facility] ([facility])

 

             Ciprofloxacin 3 MG/ML Ophthalmic Solution                          

              [facility] ([facility])

 

             Cholecalciferol 73765 UNT Oral Capsule                             

           [facility] ([facility])

 

             Ergocalciferol 87893 UNT Oral Capsule                              

          [facility] ([facility])

 

             travoprost 0.04 MG/ML Ophthalmic Solution [Travatan]               

                         [facility] ([facility]

 

             200 ACTUAT Albuterol 0.09 MG/ACTUAT Metered Dose Inhaler [ProAir]  

                                      [facility] 

([facility])

 

             prednisolone acetate 10 MG/ML Ophthalmic Suspension                

                        [facility] ([facility]

 [facility])

 

             Loperamide Hydrochloride 2 MG Oral Capsule                         

               [facility] ([facility])

 

             Ketorolac Tromethamine 4 MG/ML Ophthalmic Solution                 

                       [facility] ([facility] 

[facility])

 

                                        12 HR CHLORPHENIRAMINE POLISTIREX 1.6 MG

/ML / HYDROCODONE POLISTIREX 2 MG/ML 

Extended Release Suspension                                                 ATHE

NA ([facility])

 

             gabapentin 300 MG Oral Capsule                                     

   [facility] ([facility])

 

                                        Flucelvax Quad 2268-9072 60 mcg (15 mcg 

x 4)/0.5 mL intramuscular susp INJECT 

0.5ML INTRAMUSCULARLY                                                 [facility] ([facility]

[facility])

 

             Ciprofloxacin 3 MG/ML Ophthalmic Solution                          

              [facility] ([facility])

 

             Cholecalciferol 79822 UNT Oral Capsule                             

           [facility] ([facility])

 

             Ergocalciferol 44790 UNT Oral Capsule                              

          [facility] ([facility])

 

             travoprost 0.04 MG/ML Ophthalmic Solution [Travatan]               

                         [facility]

 

             200 ACTUAT Albuterol 0.09 MG/ACTUAT Metered Dose Inhaler [ProAir]  

                                      [facility] 

([facility])

## 2021-10-24 NOTE — CCD
Continuity of Care Document (CCD)

                             Created on: 2021



Preston Ivory

External Reference #: [id number]

: 1935

Sex: Male



Demographics





                          Address                   [address] [place]  [address]

 

                          Home Phone                [phone]

 

                          Preferred Language        Unknown

 

                          Marital Status            Unknown

 

                          Islam Affiliation     Unknown

 

                          Race                      Unknown

 

                          Ethnic Group              Unknown





Author





                          Author                    Preston KAUFMAN F.N.P.

 

                          Organization              Unknown

 

                          Address                   [address]  [address]

[place]  [address]



 

                          Phone                     [phone]







Care Team Providers





                    Care Team Member Name Role                Phone

 

                    Kei Smith MD   [facility]                [phone]







Problems





                                        Description

 

                                        No Information Available







Social History





                Type            Date            Description     Comments

 

                Birth Sex                       Unknown          

 

                Cigarette Use                   currently smokes 1/2 Pack Daily 

 

 

                ETOH Use                        Social Drinker   

 

                Tobacco Use     Start: Unknown  Patient is a current smoker, smo

kes every day  

 

                Sun Exposure                    minimum amount of sun exposure  

 

                Sun Exposure                    Has never used tanning bed  

 

                Sun Exposure                    Has never experienced blistering

 from sunburns  

 

                Sun Exposure                    Uses > 30 SPF    







Allergies, Adverse Reactions, Alerts





             Active Allergies Reaction     Severity     Comments     Date

 

             sulfa                                               04/10/2012







Medications





           Active Medications SIG        Qnty       Indications Ordering Provide

r Date

 

                          Glipizide XL                     2.5mg Tablets ER 24HR

                   1 tab 

by mouth every day                                 Unknown         

 

                          Lipitor                     20mg Tablets              

     1 tab by mouth every 

day .                                           Unknown         

 

                                        Amlodipine Besylate/Benazepril Hydrochlo

ride                     5-20mg Capsules

                  1 tab by mouth every day                           Unknown    

  

 

                          Atenolol                     25mg Tablets             

      2 tabs by mouth 

every day                                       Unknown         

 

                          Tamsulosin HCL                     0.4mg Capsules     

              1 tab by 

mouth every day                                 Unknown         

 

                          Combigan                     0.2-0.5% Solution        

           1 in drop both 

eyes twice a day                                 Unknown         

 

                          Travatan Z                     0.004% Solution        

           1 drop in both 

eyes twice a day                                 Unknown         

 

                          Aspirin                     81mg Tablets DR           

        1 by mouth every 

day                                             Unknown         

 

           Procrit    1 shot as needed every 3-4 weeks                       Unk

nown    

 

                          Cinnamon                     500mg Tablets            

       1 tab by mouth 

every day                                       Unknown         

 

           Vitamin D2                                  Unknown    







Immunizations





                                        Description

 

                                        No Information Available







Vital Signs





                Date            Vital           Result          Comment

 

                2021  3:38pm BP Systolic     148 mmHg         

 

                    BP Diastolic        78 mmHg              

 

                    Weight              150.00 lb            

 

                    Respiratory Rate    17 /min              

 

                2021 10:05am BP Systolic     151 mmHg         

 

                    BP Diastolic        66 mmHg              

 

                    Weight              155.00 lb            

 

                    Body Temperature    96.8 F             







Results





        Test    Acquired Date Facility Test    Result  H/L     Range   Note

 

                    [facility]      2021          [facility]

LC

             Icd9 Code    ICD9 Code: C44.3 <SEE NOTE>                           

 1, 2

 

             PDFReport    SEE IMAGE                               







                          1                         Refer Dr Jameson letter awai

ting signature LW 21 photo emailed, letter 

sent awaiting appt lw 21 wife called inquiring on appt with Dr Jameson, I 
spoke with Devika and Dr Jameson is out for 2 weeks and Mervin is out this week.
 She will call his wife and get him scheduled  LW 21 Patient's wife came 
into the office today and wanted referral resent to Dr Zayas and he is 
scheduled for 21 at 11:10, letter redone awaiting signature LW 21 need 
to cancel  referral with Dr Jameson photo emailed, letter sent to Dr Zayas,  
LW 21

 

                          2                         ICD9 Code: C44.329

Protocol: shave

Clinical Text: SCC

Final Diagnosis: SQUAMOUS CELL CARCINOMA, ACANTHOLYTIC TYPE, EXTENDING TO THE 
BOTTOM OF THE SECTIONS.

Gross Text: The specimen grossly was irregular in shape and measured 8 x 8 mm. 
on the surface and 2 mm. deep. It was divided into 3 sections on the long axis. 
All of the tissue was submitted for processing.

Microscopic Description: The corium is invaded by atypical keratinocytes with 
extensive acantholysis, and the lesion extends to the bottom of the sections.

CPT: 78676*1









Procedures





                Date            Code            Description     Status

 

                2021      71519           Office/Outpatient Established Mo

d MDM 30-39 Min Completed

 

                2021      24080           Shave Biopsy Of Skin, Single Les

ion Completed

 

                2021      87591           Destruction Of Lesions 2-14 Comp

leted

 

                2021      11287           Destruction Of Lesion First Comp

leted







Medical Devices





                                        Description

 

                                        No Information Available







Encounters





           Type       Date       Location   Provider   Dx         Diagnosis

 

           Office Visit 2021  3:45p Main Office Sadaf Kaufman, F.N.P. 

D48.5      

Neoplasm of uncertain behavior of skin

 

                          L57.0                     Actinic keratosis

 

                          C44.320                   Squamous cell carcinoma of s

kin of unspecified parts of face







Assessments





                Date            Code            Description     Provider

 

                2021      D48.5           Neoplasm of uncertain behavior o

f skin Sadaf Kaufman, 

F.N.P.

 

                2021      L57.0           Actinic keratosis Sadaf dunlap, F.N.P.

 

                2021      C44.320         Squamous cell carcinoma of skin 

of unspecified parts of face 

Sadaf Kaufman, F.N.P.

 

                2021      L57.0           Actinic keratosis Sadaf dunlap, F.N.P.







Plan of Treatment

Future Appointment(s):* 2021 12:15 pm - Sadaf Kaufman, F.N.P. at Main 
  Office

* 2022 12:30 pm - Sadaf Kaufman, F.N.P. at Main Office

2021 - Sadaf Kaufman, F.N.P.* D48.5 Neoplasm of uncertain behavior of 
  skin* Comments:* Shave biopsy today, L cheek - SCCWound care instructions 
  given.  Will call with pathology when available.





* L57.0 Actinic keratosis* Comments:* Sunscreen use and sun protection 
  discussed. No treatment needed today.Reassurance.   I will follow up with Preston
  in September to discuss treatment options for diffuse actinic damage.



* Follow up:* 2021 - [place] follow up





* C44.320 Squamous cell carcinoma of skin of unspecified parts of face





Functional Status





                                        Description

 

                                        No Information Available







Mental Status





                                        Description

 

                                        No Information Available







Referrals





                                        Description

 

                                        No Information Available

## 2021-10-24 NOTE — CCD
Continuity of Care Document (CCD)

                             Created on: 2021



Preston Ivory

External Reference #: [id number]

: 1935

Sex: Male



Demographics





                          Address                   [address] [place] [address]  [id number]

 

                          Home Phone                [phone]

 

                          Preferred Language        Unknown

 

                          Marital Status            Unknown

 

                          Mandaen Affiliation     Unknown

 

                          Race                      White

 

                          Ethnic Group              Not  or 





Author





                          Author                    Preston ZAYAS MD

 

                          Organization              Unknown

 

                          Address                   [address] [place] [address]  [address]



 

                          Phone                     [phone]







Care Team Providers





                    Care Team Member Name Role                Phone

 

                    Sadaf Solano N.P. AUTM                [phone]

 

                    Kei Smith M.D.   AUTM                [phone]







Problems





                    Active Problems     Provider            Date

 

                    Essential hypertension                     Onset: 2014

 

                          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

 Of Face Bhavik Ceron M.D.                                    Onset: 2014

 

                    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 Onset: 

2014

 

                    Screening for malignant neoplasm of colon DOROTHEA Kelly Onset: 2018

 

                    History of polyp of colon JEREL Kelly Onset: 

018







Social History





                Type            Date            Description     Comments

 

                Birth Sex                       Unknown          

 

                ETOH Use                        consumes 6 beers per week  

 

                Tobacco Use     Start: Unknown  Patient is a current smoker, smo

kes every day 1/2 PPD

FOR 22 YEARS 

 

                Recreational Drug Use                 Denies Drug Use  







Allergies, Adverse Reactions, Alerts





             Active Allergies Reaction     Severity     Comments     Date

 

             Sulfa        LIGHT HEADED                           2014







Medications





           Active Medications SIG        Qnty       Indications Ordering Provide

r Date

 

                          Glipizide ER                     2.5mg Tablets ER 24HR

                   1 PO 

Daily                                           Unknown         

 

                                        Amlodipine Besylate/Benazepril Hydrochlo

ride                     5-10mg Capsules

                  1 po at night                           Unknown      

0

 

                    Lipitor                     20mg Tablets                   1

 PO Every Other Day 

                                        Unknown             

 

             Atenolol                     25mg Tablets                   2 PO da

lópez                             

Unknown                                 

 

                    Tamsulosin HCL                     0.4mg Capsules           

        1 po qd             

90caps                                  Unknown             

 

                          Combigan                     0.2-0.5% Solution        

           1 drop ea eye 

bid                                             Unknown         

 

                          Travatan                     0.004% Solution          

         1 gtts Each Eye 

qd                                              Unknown         

 

             Procrit                      Solution                   1 Shot Q3-4

 Weeks                           

Unknown                                 

 

                          Vitamin D                     44077Afof Capsules      

             1 po q weekly

                4caps                           Unknown         

 

           Aspirin                     81mg Tablets                   1 po qd   

                       Unknown    



 

             Cinnamon                     500mg Tablets                   2 PO D

aily                             

Unknown                                 

 

                          Gabapentin                     300mg Capsules         

          1 tab by mouth 

twice a day     45caps                          Unknown         

 

                          Creon                     [id number] Caps DR Peter staples                   2 

capsules  a day with meals                                 Unknown         







Immunizations





                                        Description

 

                                        No Information Available







Vital Signs





                Date            Vital           Result          Comment

 

                2021  1:01pm BP Systolic     173 mmHg         

 

                    BP Diastolic        62 mmHg              

 

                    Body Temperature    98.4 F             

 

                    Height              71 inches           5'11"

 

                    Weight              154.00 lb            

 

                    BMI (Body Mass Index) 21.5 kg/m2           

 

                    Ideal Body Weight   172 lb               

 

                    Weight              69.854 kg            

 

                    BSA (Body Surface Area) 1.89 m2              

 

                2018  3:34pm BP Systolic     164 mmHg         

 

                    BP Diastolic        73 mmHg              

 

                    Height              71 inches           5'11"

 

                    Weight              153.38 lb            

 

                    BMI (Body Mass Index) 21.4 kg/m2           

 

                    Ideal Body Weight   172 lb               

 

                    Weight              69.571 kg            

 

                    BSA (Body Surface Area) 1.88 m2              







Results





        Test    Acquired Date Facility Test    Result  H/L     Range   Note

 

                    Laboratory test finding 2021          [facility]

                                        [address] [place] [id number]

           [phone] Pathology Request For Service <pending>               

          







Procedures





                                        Description

 

                                        No Information Available







Medical Devices





                                        Description

 

                                        No Information Available







Encounters





                                        Description

 

                                        No Information Available







Assessments





                Date            Code            Description     Provider

 

                2021      C44.329         Squamous cell carcinoma of skin 

of other parts of face 

Tera Zayas MD







Plan of Treatment

Future Appointment(s):* 2021  1:30 pm - ANNIE Kam at [facility] 
  Surgery Practice

2021 - Tera Zayas MD* C44.329 Squamous cell carcinoma of skin of 
  other parts of face* Comments:* It was hard to delineate the margins of the 
  scar.  I do not see any leftover of the lesion that was biopsied though there 
  is a closer area of skin redness and thickening that would be involved at the 
  bottom portion of the excision site which may return as some either reactive 
  changes to her even possibly a focus of skin cancer.Patient consented for 
  excision of the site.  The excised portions 8 mm x 8 mm x 2 mm depth and path 
  shows this was positive on the bottom part of the site.He was on the right 
  lateral decubitus position.  Area of the scar prepped with Betadine.  This was
  infiltrated with 1% lidocaine containing epinephrine.  A cruciate skin 
  incision was then created past the margins of the scar and deepened through to
  the superficial subcutaneous tissue and removed.  As mentioned may be portion 
  on the bottom part of the specimen that may have something in them. Discussed 
  postop wound care:     a. keep incision dry x 24 hours.     b. May remove 
  dressings after 24 hrs. May get area wet. pat incision dry. may replace with 
  light cover prn for comfort.. Follow up for suture removal in one week









Functional Status





                                        Description

 

                                        No Information Available







Mental Status





                                        Description

 

                                        No Information Available







Referrals





                Refer to      Reason for Referral Status          Appt Date

 

                Tera Zayas MD [facility], RT CHECK   Scheduled       2021

 

                                        [address] [place] [address] [phone]

## 2021-11-08 ENCOUNTER — HOSPITAL ENCOUNTER (OUTPATIENT)
Dept: HOSPITAL 53 - M LAB REF | Age: 86
End: 2021-11-08
Attending: INTERNAL MEDICINE
Payer: MEDICARE

## 2021-11-08 DIAGNOSIS — D64.9: Primary | ICD-10-CM

## 2021-11-08 DIAGNOSIS — K86.81: ICD-10-CM

## 2021-12-04 ENCOUNTER — HOSPITAL ENCOUNTER (EMERGENCY)
Dept: HOSPITAL 53 - M ED | Age: 86
Discharge: HOME | End: 2021-12-04
Payer: MEDICARE

## 2021-12-04 VITALS — HEIGHT: 72 IN | BODY MASS INDEX: 20.01 KG/M2 | WEIGHT: 147.71 LBS

## 2021-12-04 VITALS — SYSTOLIC BLOOD PRESSURE: 149 MMHG | DIASTOLIC BLOOD PRESSURE: 69 MMHG

## 2021-12-04 DIAGNOSIS — Z87.891: ICD-10-CM

## 2021-12-04 DIAGNOSIS — Z88.1: ICD-10-CM

## 2021-12-04 DIAGNOSIS — N39.0: Primary | ICD-10-CM

## 2021-12-04 DIAGNOSIS — D46.9: ICD-10-CM

## 2021-12-04 DIAGNOSIS — Z79.82: ICD-10-CM

## 2021-12-04 DIAGNOSIS — E78.9: ICD-10-CM

## 2021-12-04 DIAGNOSIS — Z88.2: ICD-10-CM

## 2021-12-04 DIAGNOSIS — Z79.84: ICD-10-CM

## 2021-12-04 DIAGNOSIS — E11.9: ICD-10-CM

## 2021-12-04 DIAGNOSIS — I10: ICD-10-CM

## 2021-12-04 DIAGNOSIS — Z79.899: ICD-10-CM

## 2021-12-04 LAB
BASOPHILS # BLD AUTO: 0 10^3/UL (ref 0–0.2)
BASOPHILS NFR BLD AUTO: 0.1 % (ref 0–1)
BUN SERPL-MCNC: 40 MG/DL (ref 7–18)
CALCIUM SERPL-MCNC: 8.6 MG/DL (ref 8.8–10.2)
CHLORIDE SERPL-SCNC: 107 MEQ/L (ref 98–107)
CO2 SERPL-SCNC: 25 MEQ/L (ref 21–32)
CREAT SERPL-MCNC: 1.35 MG/DL (ref 0.7–1.3)
EOSINOPHIL # BLD AUTO: 0 10^3/UL (ref 0–0.5)
EOSINOPHIL NFR BLD AUTO: 0 % (ref 0–3)
GFR SERPL CREATININE-BSD FRML MDRD: 53.3 ML/MIN/{1.73_M2} (ref 35–?)
GLUCOSE SERPL-MCNC: 93 MG/DL (ref 70–100)
HCT VFR BLD AUTO: 28 % (ref 42–52)
HGB BLD-MCNC: 9.3 G/DL (ref 13.5–17.5)
INR PPP: 1.07
LYMPHOCYTES # BLD AUTO: 1.9 10^3/UL (ref 1.5–5)
LYMPHOCYTES NFR BLD AUTO: 17.1 % (ref 24–44)
MCH RBC QN AUTO: 33.1 PG (ref 27–33)
MCHC RBC AUTO-ENTMCNC: 33.2 G/DL (ref 32–36.5)
MCV RBC AUTO: 99.6 FL (ref 80–96)
MONOCYTES # BLD AUTO: 1.2 10^3/UL (ref 0–0.8)
MONOCYTES NFR BLD AUTO: 10.6 % (ref 2–8)
N GONORRHOEA RRNA SPEC QL NAA+PROBE: NEGATIVE
NEUTROPHILS # BLD AUTO: 7.8 10^3/UL (ref 1.5–8.5)
NEUTROPHILS NFR BLD AUTO: 71.5 % (ref 36–66)
PLATELET # BLD AUTO: 125 10^3/UL (ref 150–450)
POTASSIUM SERPL-SCNC: 4.4 MEQ/L (ref 3.5–5.1)
PROTHROMBIN TIME: 14.3 SECONDS (ref 12.7–14.5)
RBC # BLD AUTO: 2.81 10^6/UL (ref 4.3–6.1)
SODIUM SERPL-SCNC: 140 MEQ/L (ref 136–145)
WBC # BLD AUTO: 10.9 10^3/UL (ref 4–10)

## 2021-12-04 NOTE — REPVR
PROCEDURE INFORMATION: 

Exam: CT Abdomen And Pelvis Without Contrast 

Exam date and time: 12/4/2021 5:44 PM 

Age: 86 years old 

Clinical indication: Other: Hematuria 



TECHNIQUE: 

Imaging protocol: Computed tomography of the abdomen and pelvis without 

contrast. 

Radiation optimization: All CT scans at this facility use at least one of these 

dose optimization techniques: automated exposure control; mA and/or kV 

adjustment per patient size (includes targeted exams where dose is matched to 

clinical indication); or iterative reconstruction. 



COMPARISON: 

ABD COMPLETE US 5/5/2020 8:42 AM 



FINDINGS: 

Tubes, catheters and devices: Status post placement of a bifurcating endograft 

within the abdominal aorta with a a maximum size of the lower native abdominal 

aorta measuring 4.6 cm. 



Liver: Normal. No mass. 

Gallbladder and bile ducts: Gravel layers dependently within the lumen of the 

gallbladder. Gallbladder otherwise unremarkable. 

Pancreas: Normal. No ductal dilation. 

Spleen: Normal. No splenomegaly. 

Adrenal glands: Normal. No mass. 

Kidneys and ureters: Bilateral nonobstructive simple cyst upper pole left 

kidney measures 1.9 cm. No follow-up suggested. Bilateral renal vascular 

calcifications. Kidneys otherwise unremarkable. 

Stomach and bowel: Moderate diverticulosis is present in the distal colon. No 

diverticulitis. 

Appendix: No evidence of appendicitis. 



Intraperitoneal space: Unremarkable. No free air. No significant fluid 

collection. 

Vasculature: Unremarkable. No abdominal aortic aneurysm. 

Lymph nodes: Unremarkable. No enlarged lymph nodes. 

Urinary bladder: There is diffuse circumferential thickening of the bladder 

wall associated with marked inflammatory changes in the perivesicular space. 

Findings consistent with acute cystitis. Clinical correlation to exclude occult 

neoplasm suggested. 

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 

enlarged. 

Bones/joints: The spine demonstrates moderate degenerative changes. 

Dextroscoliosis. Mild retrolisthesis of L3 on L4 and slight anterolisthesis of 

L4 on L5. Mild central spinal stenosis L2-L3, severe central spinal stenosis 

L3-L4 and L4-L5. Mild-to-moderate central spinal stenosis L5-S1. Osteoporosis. 

Bilateral degenerative hip arthropathy. 

Soft tissues: Unremarkable. 



IMPRESSION: 

1. 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 

2. Gravel layers dependently within the lumen of the gallbladder. Gallbladder 

otherwise unremarkable. 

3. There is diffuse circumferential thickening of the bladder wall associated 

with marked inflammatory changes in the perivesicular space. Findings 

consistent with acute cystitis. Clinical correlation to exclude occult neoplasm 

suggested. 

4. Moderate diverticulosis is present in the distal colon. No diverticulitis. 



COMMENTS: 

Consistent with the American College of [employer]iology's Incidental Findings 

Committee white paper (J Am Idania Radiol 2018): Any incidental renal lesion less 

than 1 cm or classified as too small to characterize, or any incidental cystic 

renal lesion characterized as simple-appearing, is likely benign. No follow-up 

imaging is recommended for these lesions per consensus recommendations based on 

imaging criteria. 



Electronically signed by: Naresh Fowler On 12/04/2021  18:45:31 PM

## 2021-12-04 NOTE — CCD
Summarization Of Episode

                             Created on: 2021



LUMA WRIGHT

External Reference #: [id number]

: 1935

Sex: Undifferentiated



Demographics





                          Address                   [address] [place] [address]

 

                          Home Phone                [phone]

 

                          Preferred Language        English

 

                          Marital Status            Unknown

 

                          Congregation Affiliation     Unknown

 

                          Race                      Unknown

 

                          Ethnic Group              Not  or 





Author





                          Author                    [facility]

 

                          Organization              [facility]

 

                          Address                   Unknown

 

                          Phone                     Unavailable







Support





                Name            Relationship    Address         Phone

 

                TORRI WRIGHT Next Of Kin     Unknown         Unavailable

 

                MCKENNA WRIGHT   Next Of Kin     Unknown         [phone]

 

                    TORRI WRIGHT     Next Of Kin         [address] [place] [address] 

[place] [address]                    [phone]

 

                    SELF EMPLOYED       Next Of Kin         [address] [place] [address] 

[place] [address]                    [phone]

 

                RE              Next Of Kin     Unknown         Unavailable

 

                    ADRIAN WRIGHT    Next Of Kin         [address] [place] [address]                    [phone]

 

                    DIANELYS E TORRI    ECON                [address] [place] [address]                    Unavailable







Care Team Providers





                    Care Team Member Name Role                Phone

 

                    MUNIRA Varela MD Unavailable         Unavailable

 

                    MUNIRA Varela MD Unavailable         Unavailable

 

                    MUNIRA Varela MD Unavailable         Unavailable

 

                    MUNIRA Varela MD Unavailable         Unavailable

 

                    MUNIRA Varela MD Unavailable         Unavailable

 

                    MUNIRA Varela MD Unavailable         Unavailable

 

                    MUNIRA Varela MD Unavailable         Unavailable

 

                    MUNIRA Varela MD Unavailable         Unavailable

 

                    MUNIRA Varela MD Unavailable         Unavailable

 

                    MUNIRA Varela MD Unavailable         Unavailable

 

                    MUNIRA Varela MD Unavailable         Unavailable

 

                    JUAN Wheatley MD Unavailable         Unavailable

 

                    JUAN Wheatley MD Unavailable         Unavailable

 

                    JUAN Wheatley MD Unavailable         Unavailable

 

                    JUAN Wheatley MD Unavailable         Unavailable

 

                    JUAN Wheatley MD Unavailable         Unavailable

 

                    JUAN Wheatley MD Unavailable         Unavailable

 

                    JUAN Wheatley MD Unavailable         Unavailable

 

                    JUAN Wheatley MD Unavailable         Unavailable

 

                    JUAN Wheatley MD Unavailable         Unavailable

 

                    JUAN Wheatley MD Unavailable         Unavailable

 

                    JUAN Wheatley MD Unavailable         Unavailable

 

                    JUAN Wheatley MD Unavailable         Unavailable

 

                    JUAN Wheatley MD Unavailable         Unavailable

 

                    JUAN Wheatley MD Unavailable         Unavailable

 

                    JUAN Wheatley MD Unavailable         Unavailable

 

                    JUAN Wheatley MD Unavailable         Unavailable

 

                    JUAN Wheatley MD Unavailable         Unavailable

 

                    BolJUAN rodríguez MD Unavailable         Unavailable

 

                    BolJUAN rodríguez MD Unavailable         Unavailable

 

                    BolJUAN rodríguez MD Unavailable         Unavailable

 

                    BollaJUAN MD Unavailable         Unavailable

 

                    BolJUAN rodríguez MD Unavailable         Unavailable

 

                    BolJUAN rodríguez MD Unavailable         Unavailable

 

                    Bolla, JUAN Ramos MD Unavailable         Unavailable

 

                    BolJUAN rodríguez MD Unavailable         Unavailable

 

                    Bolla, JUAN Ramos MD Unavailable         Unavailable

 

                    BolJUAN rodríguez MD Unavailable         Unavailable

 

                    Bolla, JUAN Ramos MD Unavailable         Unavailable

 

                    BolJUAN rodríguez MD Unavailable         Unavailable

 

                    BolJUAN rodríguez MD Unavailable         Unavailable

 

                    BolJUAN rodríguez MD Unavailable         Unavailable

 

                    Bolla, JUAN Ramos MD Unavailable         Unavailable

 

                    Bolla, JUAN Ramos MD Unavailable         Unavailable

 

                    Bolla, JUAN Ramos MD Unavailable         Unavailable

 

                    Bolla, JUAN Ramos MD Unavailable         Unavailable

 

                    Bolla, JUAN Ramos MD Unavailable         Unavailable

 

                    BolJUAN rodríguez MD Unavailable         Unavailable

 

                    Bolla, JUAN Ramos MD Unavailable         Unavailable

 

                    BolJUAN rodríguez MD Unavailable         Unavailable

 

                    BolJUAN rodríguez MD Unavailable         Unavailable

 

                    BolJUAN rodríguez MD Unavailable         Unavailable

 

                    BolJUAN rodríguez MD Unavailable         Unavailable

 

                    BolJUAN rodríguez MD Unavailable         Unavailable

 

                    BolJUAN rodríguez MD Unavailable         Unavailable

 

                    BolJUAN rodríguez MD Unavailable         Unavailable

 

                    BolJUAN rodríguez MD Unavailable         Unavailable

 

                    BolJUAN rodríguez MD Unavailable         Unavailable

 

                    BolJUAN rodríguez MD Unavailable         Unavailable

 

                    BolJUAN rodríguez MD Unavailable         Unavailable

 

                    [place], Brea FNP Unavailable         Unavailable

 

                    [place], Brea FNP Unavailable         Unavailable

 

                    [place], Brea FNP Unavailable         Unavailable

 

                    [place], Brea FNP Unavailable         Unavailable

 

                    [place], Brea FNP Unavailable         Unavailable

 

                    [place], Brea FNP Unavailable         Unavailable

 

                    [place], Brea FNP Unavailable         Unavailable

 

                    [place], Brea FNP Unavailable         Unavailable

 

                    [place], Brea FNP Unavailable         Unavailable

 

                    [place], Brea FNP Unavailable         Unavailable

 

                    [place], Brea FNP Unavailable         Unavailable

 

                    [place], Brea FNP Unavailable         Unavailable

 

                    [place], Brea FNP Unavailable         Unavailable

 

                    [place], Brea FNP Unavailable         Unavailable

 

                    [place], Brea FNP Unavailable         Unavailable

 

                    [place], Brea FNP Unavailable         Unavailable

 

                    [place], Brea FNP Unavailable         Unavailable

 

                    [place], Brea FNP Unavailable         Unavailable

 

                    [place], Brea FNP Unavailable         Unavailable

 

                    [place], Brea FNP Unavailable         Unavailable

 

                    [place], Brea FNP Unavailable         Unavailable

 

                    [place], Brea FNP Unavailable         Unavailable

 

                    [place], Brea FNP Unavailable         Unavailable

 

                    [place], Brea FNP Unavailable         Unavailable

 

                    [place], Brea FNP Unavailable         Unavailable

 

                    [place], Brea FNP Unavailable         Unavailable

 

                    [place], Brea FNP Unavailable         Unavailable

 

                    [place], Brea FNP Unavailable         Unavailable

 

                    [place], Brea FNP Unavailable         Unavailable

 

                    [place], Brea FNP Unavailable         Unavailable

 

                    [place], Brea FNP Unavailable         Unavailable

 

                    [place], Brea FNP Unavailable         Unavailable

 

                    [place], Brea FNP Unavailable         Unavailable

 

                    [place], Brea FNP Unavailable         Unavailable

 

                    [place], Brea FNP Unavailable         Unavailable

 

                    [place], Brea FNP Unavailable         Unavailable

 

                    Jumalon, M Page FNP Unavailable         Unavailable

 

                    Jumalon, M Page FNP Unavailable         Unavailable

 

                    Jumalon, M Page FNP Unavailable         Unavailable

 

                    Jumalon, M Page FNP Unavailable         Unavailable

 

                    Jumalon, M Page FNP Unavailable         Unavailable

 

                    Jumalon, M Page FNP Unavailable         Unavailable

 

                    Jumalon, M Page FNP Unavailable         Unavailable

 

                    Jumalon, M Page FNP Unavailable         Unavailable

 

                    Jumalon, M Page FNP Unavailable         Unavailable

 

                    Jumalon, M Page FNP Unavailable         Unavailable

 

                    Jumalon, M Page FNP Unavailable         Unavailable

 

                    Jumalon, M Page FNP Unavailable         Unavailable

 

                    Jumalon, M Page FNP Unavailable         Unavailable

 

                    Jumalon, M Page FNP Unavailable         Unavailable

 

                    Jumalon, M Page FNP Unavailable         Unavailable

 

                    Jumalon, M Page FNP Unavailable         Unavailable

 

                    Jumalon, M Page FNP Unavailable         Unavailable

 

                    Jumalon, M Page FNP Unavailable         Unavailable

 

                    Jumalon, M Page FNP Unavailable         Unavailable

 

                    Jumalon, M Page FNP Unavailable         Unavailable

 

                    Jumalon, M Page FNP Unavailable         Unavailable

 

                    Jumalon, M Page FNP Unavailable         Unavailable

 

                    Jumalon, M Page FNP Unavailable         Unavailable

 

                    Jumalon, M Page FNP Unavailable         Unavailable

 

                    Jumalon, M Page FNP Unavailable         Unavailable

 

                    Jumalon, M Page FNP Unavailable         Unavailable

 

                    Jumalon, M Page FNP Unavailable         Unavailable

 

                    Jumalon, M Page FNP Unavailable         Unavailable

 

                    Jumalon, M Page FNP Unavailable         Unavailable

 

                    Jumalon, M Page FNP Unavailable         Unavailable

 

                    MERON,  LYNDSAY    Unavailable         Unavailable

 

                    MERON,  LYNDSAY    Unavailable         Unavailable

 

                    MERON,  LYNDSAY    Unavailable         Unavailable

 

                    MERON,  LYNDSAY    Unavailable         Unavailable

 

                    MERON,  LYNDSAY    Unavailable         Unavailable

 

                    MERON,  LYNDSAY    Unavailable         Unavailable

 

                    Brooks Munguia, MELODY Alonso MD, FACS Unavailable         Unavailable

 

                    Brooks Munguia, MELODY Alonso MD, FACS Unavailable         Unavailable

 

                    Brooks Munguia, MELODY Alonso MD, FACS Unavailable         Unavailable

 

                    Brooks Munguia, MELODY Alonso MD, FACS Unavailable         Unavailable

 

                    Brooks Munguia, MELODY Alonso MD, FACS Unavailable         Unavailable

 

                    Brooks Munguia, MELODY Alonso MD, FACS Unavailable         Unavailable

 

                    Brooks Munguia, MELODY Alonso MD, FACS Unavailable         Unavailable

 

                    Brooks Munguia, MELODY Alonso MD, FACS Unavailable         Unavailable

 

                    Brooks Munguia, MELODY Alonso MD, FACS Unavailable         Unavailable

 

                    Brooks Munguia, MELODY Alonso MD, FACS Unavailable         Unavailable

 

                    Brooks Munguia, MELODY Alonso MD, FACS Unavailable         Unavailable

 

                    Brooks Munguia, MELODY Alonso MD, FACS Unavailable         Unavailable

 

                    Brooks Munguia, MELODY Alonso MD, FACS Unavailable         Unavailable

 

                    Brooks Munguia, MELODY Alonso MD, FACS Unavailable         Unavailable

 

                    Brooks Munguia, MELODY Alonso MD, FACS Unavailable         Unavailable

 

                    Brooks Munguia, MELODY Alonos MD, FACS Unavailable         Unavailable

 

                    Brooks Munguia, MELODY Alonso MD, FACS Unavailable         Unavailable

 

                    Brooks Munguia, MELODY Alonso MD, FACS Unavailable         Unavailable

 

                    Brooks Munguia, MELODY Alonso MD, FACS Unavailable         Unavailable

 

                    Brooks Munguia, MELODY Alonso MD, FACS Unavailable         Unavailable

 

                    Brooks Munguia, MELODY Alonso MD, FACS Unavailable         Unavailable

 

                    Brooks Munguia, MELODY Alonso MD, FACS Unavailable         Unavailable

 

                    Brooks Munguia, MELODY Alonso MD, FACS Unavailable         Unavailable

 

                    Brooks Munguia, MELODY Alonso MD, FACS Unavailable         Unavailable

 

                    Brooks Munguia, MELODY Alonso MD, FACS Unavailable         Unavailable

 

                    Brooks Munguia, MELODY Alonso MD, FACS Unavailable         Unavailable

 

                    Brooks Munguia, MELODY Alonso MD, FACS Unavailable         Unavailable

 

                    Brooks Munguia, MELODY Alonso MD, FACS Unavailable         Unavailable

 

                    Brooks Munguia, MELODY Alonso MD, FACS Unavailable         Unavailable

 

                    Brooks Munguia, MELODY Alonso MD, FACS Unavailable         Unavailable

 

                    Brooks Munguia, MELODY Alonso MD, FACS Unavailable         Unavailable

 

                    Cecilia Munguia, MELODY Alonso MD, FACS Unavailable         Unavailable

 

                    Brooks Ezra, MELODY Alonso MD, FACS Unavailable         Unavailable

 

                    Brooks Ezra, MELODY Alonso MD, FACS Unavailable         Unavailable

 

                    Brooks Ezra, MELODY Alonso MD, FACS Unavailable         Unavailable

 

                    Brooks Ezra, MELODY Alonso MD, FACS Unavailable         Unavailable

 

                    Brooks Munguia, MELODY Alonso MD, FACS Unavailable         Unavailable

 

                    Brooks Ezra, MELODY Alonso MD, FACS Unavailable         Unavailable

 

                    Brooks Munguia, MELODY Alonso MD, FACS Unavailable         Unavailable

 

                    Altamirano, L Maryuri RPA Unavailable         Unavailable

 

                    Altamirano, L Maryuri RPA Unavailable         Unavailable

 

                    Altamirano, L Maryuri RPA Unavailable         Unavailable

 

                    Altamirano, L Maryuri RPA Unavailable         Unavailable

 

                    Altamirano, L Maryuri RPA Unavailable         Unavailable

 

                    Altamirano, L Maryuri RPA Unavailable         Unavailable

 

                    Altamirano, L Maryuri RPA Unavailable         Unavailable

 

                    Altamirano, L Maryuri RPA Unavailable         Unavailable

 

                    Altamirano, L Maryuri RPA Unavailable         Unavailable

 

                    Altamirano, L Maryuri RPA Unavailable         Unavailable

 

                    Altamirano, L Maryuri RPA Unavailable         Unavailable

 

                    Altamirano, L Maryuri RPA Unavailable         Unavailable

 

                    Altamirano, L Maryuri RPA Unavailable         Unavailable

 

                    Altamirano, L Maryuri RPA Unavailable         Unavailable

 

                    Altamirano, L Maryuri RPA Unavailable         Unavailable

 

                    Altamirano, L Maryuri RPA Unavailable         Unavailable

 

                    Altamirano, L Maryuri RPA Unavailable         Unavailable

 

                    Altamirano, L Maryuri RPA Unavailable         Unavailable

 

                    Altamirano, L Maryuri RPA Unavailable         Unavailable

 

                    Altamirano, L Maryuri RPA Unavailable         Unavailable

 

                    Altamirano, L Maryuri RPA Unavailable         Unavailable

 

                    Altamirano, L Maryuri RPA Unavailable         Unavailable

 

                    Altamirano, L Maryuri RPA Unavailable         Unavailable

 

                    Altamirano, L Maryuri RPA Unavailable         Unavailable

 

                    Altamirano, L Maryuri RPA Unavailable         Unavailable

 

                    Altamirano, L Maryuri RPA Unavailable         Unavailable

 

                    Altamirano, L Maryuri RPA Unavailable         Unavailable

 

                    Altamriano, L Maryuri RPA Unavailable         Unavailable

 

                    Altamirnao, L Maryuri RPA Unavailable         Unavailable

 

                    Altamirano, L Maryuri RPA Unavailable         Unavailable

 

                    Altamirano, L Maryuri RPA Unavailable         Unavailable

 

                    Atlamirano, L Maryuri RPA Unavailable         Unavailable

 

                    Tyler, Mi Young PT    Unavailable         Unavailable

 

                    Tyler, Mi Young PT    Unavailable         Unavailable

 

                    Tyler, Mi Young PT    Unavailable         Unavailable

 

                    Tyler, Mi Young PT    Unavailable         Unavailable

 

                    Tyler, Mi Young PT    Unavailable         Unavailable

 

                    Tyler, Mi Young PT    Unavailable         Unavailable

 

                    Tyler, Mi Young PT    Unavailable         Unavailable

 

                    Tyler, Mi Young PT    Unavailable         Unavailable

 

                    Tyler, Mi Young PT    Unavailable         Unavailable

 

                    Tyler, Mi Young PT    Unavailable         Unavailable

 

                    Tyler, Mi Young PT    Unavailable         Unavailable

 

                    Tyler, Mi Young PT    Unavailable         Unavailable

 

                    Tyler, Mi Young PT    Unavailable         Unavailable

 

                    Tyler, Mi Young PT    Unavailable         Unavailable

 

                    Tyler, Mi Young PT    Unavailable         Unavailable

 

                    Tyler, Mi Young PT    Unavailable         Unavailable

 

                    Tyler, Mi Young PT    Unavailable         Unavailable

 

                    Tyler, Mi Young PT    Unavailable         Unavailable

 

                    Tyler, Mi Young PT    Unavailable         Unavailable

 

                    Tyler, Mi Young PT    Unavailable         Unavailable

 

                    Tyler, Mi Young PT    Unavailable         Unavailable

 

                    Tyler, Mi Young PT    Unavailable         Unavailable

 

                    Tyler, Mi Young PT    Unavailable         Unavailable

 

                    Amador,  Lyndsay PA    Unavailable         Unavailable

 

                    Amador,  Lyndsay PA    Unavailable         Unavailable

 

                    Amador,  Lyndsay PA    Unavailable         Unavailable

 

                    Amador,  Lyndsay PA    Unavailable         Unavailable

 

                    Amador,  Lyndsay PA    Unavailable         Unavailable

 

                    Amador,  Lyndsay PA    Unavailable         Unavailable

 

                    Amador,  Lyndsay PA    Unavailable         Unavailable

 

                    Amador,  Lyndsay PA    Unavailable         Unavailable

 

                    Amador,  Lyndsay PA    Unavailable         Unavailable

 

                    Amador,  Lyndsay PA    Unavailable         Unavailable

 

                    Amador,  Lyndsay PA    Unavailable         Unavailable

 

                    Amador,  Lyndsay PA    Unavailable         Unavailable

 

                    Amador,  Lyndsay PA    Unavailable         Unavailable

 

                    Amador,  Lyndsay PA    Unavailable         Unavailable

 

                    Amador,  Lyndsay PA    Unavailable         Unavailable

 

                    Amador,  Lyndsay PA    Unavailable         Unavailable

 

                    Amador,  Lyndsay PA    Unavailable         Unavailable

 

                    Amador,  Lyndsay PA    Unavailable         Unavailable

 

                    Amador,  Lyndsay PA    Unavailable         Unavailable

 

                    Amador,  Lyndsay PA    Unavailable         Unavailable

 

                    Amador,  Lyndsay PA    Unavailable         Unavailable

 

                    Amador,  Lyndsay PA    Unavailable         Unavailable

 

                    Amador,  Lyndsay PA    Unavailable         Unavailable

 

                    Amador,  Lyndsay PA    Unavailable         Unavailable

 

                    Amador,  Lyndsay PA    Unavailable         Unavailable

 

                    Amador,  Lyndsay PA    Unavailable         Unavailable

 

                    Amador,  Lyndsay PA    Unavailable         Unavailable

 

                    Amador,  Lyndsay PA    Unavailable         Unavailable

 

                    Amador,  Lyndsay PA    Unavailable         Unavailable

 

                    Amador,  Lyndsay PA    Unavailable         Unavailable

 

                    Amador,  Lyndsay PA    Unavailable         Unavailable

 

                    Amador,  Lyndsay PA    Unavailable         Unavailable

 

                    Amador,  Lyndsay PA    Unavailable         Unavailable

 

                    Amador,  Lyndsay PA    Unavailable         Unavailable

 

                    Amador,  Lyndsay PA    Unavailable         Unavailable

 

                    Amador,  Lyndsay PA    Unavailable         Unavailable

 

                    Amador,  Lyndsay PA    Unavailable         Unavailable

 

                    Amador,  Lyndsay PA    Unavailable         Unavailable

 

                    Amador,  Lyndsay PA    Unavailable         Unavailable

 

                    Amador,  Lyndsay PA    Unavailable         Unavailable

 

                    Amador,  Lyndsay PA    Unavailable         Unavailable

 

                    Amador,  Lyndsay PA    Unavailable         Unavailable

 

                    Amador,  Lyndsay PA    Unavailable         Unavailable

 

                    Amador,  Lyndsay PA    Unavailable         Unavailable

 

                    Amador,  Lyndsay PA    Unavailable         Unavailable

 

                    Amador,  Lyndsay PA    Unavailable         Unavailable

 

                    Amador,  Lyndsay PA    Unavailable         Unavailable

 

                    Amador,  Lyndsay PA    Unavailable         Unavailable

 

                    Amador,  Lyndsay PA    Unavailable         Unavailable

 

                    Amador,  Lyndsay PA    Unavailable         Unavailable

 

                    Amador,  Lyndsay PA    Unavailable         Unavailable

 

                    Amador,  Lyndsay PA    Unavailable         Unavailable



                                  



Re-disclosure Warning

          The records that you are about to access may contain information from 
federally-assisted alcohol or drug abuse programs. If such information is 
present, then the following federally mandated warning applies: This information
has been disclosed to you from records protected by federal confidentiality 
rules (42 CFR part 2). The federal rules prohibit you from making any further 
disclosure of this information unless further disclosure is expressly permitted 
by the written consent of the person to whom it pertains or as otherwise 
permitted by 42 CFR part 2. A general authorization for the release of medical 
or other information is NOT sufficient for this purpose. The Federal rules 
restrict any use of the information to criminally investigate or prosecute any 
alcohol or drug abuse patient.The records that you are about to access may 
contain highly sensitive health information, the redisclosure of which is 
protected by Article 27-F of the [facility] Public Health law. If you 
continue you may have access to information: Regarding HIV / AIDS; Provided by 
facilities licensed or operated by the [facility] Office of Mental Health; 
or Provided by the [facility] Office for People With Developmental 
Disabilities. If such information is present, then the following [place] 
mandated warning applies: This information has been disclosed to you from 
confidential records which are protected by state law. State law prohibits you 
from making any further disclosure of this information without the specific 
written consent of the person to whom it pertains, or as otherwise permitted by 
law. Any unauthorized further disclosure in violation of state law may result in
a fine or group home sentence or both. A general authorization for the release of 
medical or other information is NOT sufficient authorization for further disc
losure.                                                                         
    



Allergies and Adverse Reactions

          



           Type       Description Substance  Reaction   Status     Data Source(s

)

 

           Allergy to substance No Known Allergies No known allergies (situation

)                       

FERN (Gavin Munguia MD [facility])



                                                                                
       



Family History

          



             Family Member Name Family Member Gender Family Member Status Date o

f Status 

Description                             Data Source(s)

 

           Unknown    Male       Problem                          [facility] ([facility])

 

                                        () 

 

           Unknown    Female     Problem                          [facility] ([facility])

 

           Unknown    Female     Problem                          [facility] ([facility]



                                                                                
                 



Encounters

          



           Encounter  Providers  Location   Date       Indications Data Source(s

)

 

                Outpatient      Attender: Sadaf Bailey [facility] Main Office     

2021 11:45:00 AM 

EDT                                                 [facility] ([facility]

 

           Outpatient Attender: LYNDSAY CHANCE Main Office 10/27/2021 03:00:00 P

M EDT            

[facility] ([facility])

 

           Outpatient Attender: LYNDSAY CHANCE Main Office 10/20/2021 11:30:00 A

M EDT            

[facility] ([facility])

 

                Outpatient      Attender: Sadaf Bailey [facility] Main Office     

2021 12:15:00 PM 

EDT                                                 [facility]

 

                Office Visit    Attender: Maryuri Arellano/Raymond/Pascual/R

eindl 2021 

01:30:00 PM EDT                                     [facility] ([facility]

actice, )

 

                Outpatient      Attender: Sadaf Bailey [facility] Main Office     

2021 03:45:00 PM 

EDT                                                 [facility]

 

                                        Outpatient<td ID="encounterTypeDescripti

onID0">2 Year Follow-Up</td><td>Gavin Walker MD, VENKATA</td><td>Gavin Walker MD 
[facility]</td><td>2021</td><td>11:49AM</td><td>12:36PM</td><td><content 
ID="encounterDiagnosisID0-0">Cataract Senile Posterior Subcapsular 
Polar</content>, <content ID="encounterDiagnosisID0-1">Cataract Senile 
Cortical</content>, <content ID="encounterDiagnosisID0-2">Cataract Senile 
Nuclear</content>, <content ID="encounterDiagnosisID0-3">Taking Medication For 
Diabetes Long-term Use of Oral Hypoglycemics</content>, <content 
ID="encounterDiagnosisID0-4">Glaucoma Open-angle Primary Both Eyes</content>, 
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 

VENKATA Walker MD [facility]  2021 11:49:00 AM EDT -

 2021 12:36:00 

PM EDT                                  Taking Medication For Diabetes Long-term

 Use of Oral 

HypoglycemicsCataract Senile Posterior Subcapsular PolarType 2 Diab W/ Diab 
Retinopathy Mild Nonprolif Without Macular EdemaCataract Senile CorticalGlaucoma
Open-angle Primary Both EyesCataract Senile Nuclear FERN (Gavin Munguia MD [facility])

 

                                        Taking Medication For Diabetes Long-term

 Use of Oral Hypoglycemics 

 

                                        Cataract Senile Posterior Subcapsular Po

lar 

 

                                        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

prolif Without Macular Edema 

 

                                        Cataract Senile Cortical 

 

                                        Glaucoma Open-angle Primary Both Eyes 

 

                                        Cataract Senile Nuclear 

 

           Outpatient Attender: Lyndsay VALENCIA Main Office 2021 11:00:00 A

M EDT            

[facility] ([facility])

 

                Outpatient      Attender: Petr Varela MD Physical Therapy  10:15:00 AM 

EDT                                                 [facility] ([facility]

 

             Outpatient   Attender: Colin Scott PT              2021 05:05:0

0 PM EDT FULL INCONTINENCE

OF FECES DIARRHEA FECAL SMEARING        [facility]

 

                                        FULL INCONTINENCE OF FECES DIARRHEA FECA

L SMEARING 

 

           Outpatient Attender: Colin Scott PT            2021 05:05:00 PM E

DT            [facility], [place]: [id number] [address], [place] 

[address], Ph. [phone] Attender: Page [facility] 

[facility] 10/21/2020 12:00:00 AM EDT                     ATHE

[facility] ([facility]

[facility])

 

                                        Richard Wheatley MD: [id number] [address], [place] [address], Ph. [phone]  Attender: Richard Wheatley MD [place] [facility] 10/05/2020 12:00:00 AM EDT                     SAIDA 

([facility])

 

                                        Richard Wheatley MD: [id number] [facility] [address], [facility] [address], Ph. [phone]  Attender: Richard Wheatley MD [place] [facility] 10/05/2020 12:00:00 AM EDT                     SAIDA 

([facility])



                                                                                
                                                                                
                                                        



Immunizations

          



             Vaccine      Date         Status       Description  Data Source(s)

 

             COVID-19 VACCINE Pfizer 10/20/2021 12:00:00 AM EDT completed       

          [facility]

 

                                        Vaccine Series Complete: YESThis Data wa

s Submitted to [facility] Via [email or web address]. 

 

             COVID-19 VACC, MRNA(PFIZER)/PF 10/20/2021 12:00:00 AM EDT completed

                 [facility]

 

             COVID-19 VACCINE Pfizer 2021 12:00:00 AM EST completed       

          [facility]

 

                                        Vaccine Series Complete: YESThis Data wa

s Submitted to [facility] Via [employer]. 

 

             COVID-19 VACCINE Pfizer 2021 12:00:00 AM EST completed       

          [facility]

 

                                        Vaccine Series Complete: NOThis Data was

 Submitted to [facility] Via [employer]. 

 

                          INFLUENZA VIRUS VACCINE QUADRIVAL SPLIT -21(65 YR 

UP)/PF 10/13/2020 12:00:00

AM EDT              completed                               [facility]



                                                                                
                                               



Medications

          



          Medication Brand Name Start Date Product Form Dose      Route     Admi

nistrative 

Instructions Pharmacy Instructions Status     Indications Reaction   Description

 Data 

Source(s)

 

          NEBULIZER AND COMPRESSOR           2021 12:00:00 AM EST device  

  1                   USE AS DIRECTED

           USE AS DIRECTED SOLD: 2021                                  [facility]

 

          500 mg              11/15/2021 12:00:00 AM EST tablet    14           

       TAKE ONE TABLET BY MOUTH EVERY 

12 HOURS FOR 7 DAYS       TAKE ONE TABLET BY MOUTH EVERY 12 HOURS FOR 7 DAYS SOL

D: 

11/15/2021                                                      [facility]

 

             2.5 mg /3 mL (0.083 %)              11/15/2021 12:00:00 AM EST solu

tion for nebulization 75

                                        INHALE THE CONTENTS OF ONE VIAL VIA NEBU

LIZER FOUR TIMES A DAY AS NEEDED 

INHALE THE CONTENTS OF ONE VIAL VIA NEBULIZER FOUR TIMES A DAY AS NEEDED SOLD: 

11/15/2021                                                      [facility]

 

                                        12 HR CHLORPHENIRAMINE POLISTIREX 1.6 MG

/ML / HYDROCODONE POLISTIREX 2 MG/ML 

Extended Release Suspension 10-8 mg/5 mL HYDROCODONE/CHLORPHEN P-STIREX 

11/15/2021 12:00:00 AM EST suspension,extended rel 12 hr 100                    

         GIVE 5ML BY MOUTH

EVERY 12 HOURS AS NEEDED MAXIMUM DAILY DOSE = 10ML GIVE 5ML BY MOUTH EVERY 12 

HOURS AS NEEDED MAXIMUM DAILY DOSE = 10ML SOLD: 11/15/2021                      

                  [facility]

 

                36,000-114,000- 180,000 unit                 2021 12:00:00

 AM EDT capsule,delayed 

release(DR/EC)  90                              TAKE ONE CAPSULE BY MOUTH THREE 

TIMES A DAY WITH EACH MEAL 

TAKE ONE CAPSULE BY MOUTH THREE TIMES A DAY WITH EACH MEAL SOLD: 2021     

                            

                                        [facility]

 

                          Amlodipine 5 MG / Benazepril hydrochloride 10 MG Oral 

Capsule 5-10 mg AMLODIPINE

BESYLATE/BENAZEPRIL 10/19/2021 12:00:00 AM EDT capsule      90                  

      TAKE ONE CAPSULE BY 

MOUTH EVERY DAY TAKE ONE CAPSULE BY MOUTH EVERY DAY SOLD: 10/20/2021            

                      

[facility]

 

        240 mcg/0.7 mL         10/07/2021 12:00:00 AM EDT syringe 0             

  INJECT AS DIRECTED 

INJECT AS DIRECTED SOLD: 10/07/2021                                        [facility]

 

          2.5 mg              10/04/2021 12:00:00 AM EDT tablet extended release

 24hr 90                  TAKE ONE 

TABLET BY MOUTH EVERY DAY TAKE ONE TABLET BY MOUTH EVERY DAY SOLD: 10/05/2021   

              

                                                    [facility]

 

          5 %                 2021 12:00:00 AM EDT cream     40           

       APPLY TOPICALLY TO LATERAL FOREHEAD,

TEMPLES AND LEFT EAR SPARINGLY TWO TIMES A DAY APPLY TOPICALLY TO LATERAL 

FOREHEAD, TEMPLES AND LEFT EAR SPARINGLY TWO TIMES A DAY SOLD: 10/05/2021       

                                 

[facility]

 

           Fluorouracil 50 MG/ML Topical Cream Fluorouracil 2021 12:00:00 

AM EDT                       

                              active                                  [facility] ([facility]

 

          0.4 mg              2021 12:00:00 AM EDT capsule   90           

       TAKE ONE CAPSULE BY MOUTH EVERY

DAY        TAKE ONE CAPSULE BY MOUTH EVERY DAY SOLD: 2021                 

                 [facility]

 

          25 mg               2021 12:00:00 AM EDT tablet    180          

       TAKE TWO TABLETS BY MOUTH EVERY 

DAY        TAKE TWO TABLETS BY MOUTH EVERY DAY SOLD: 2021                 

                 [facility]

 

          400 mg              2021 12:00:00 AM EDT capsule   90           

       TAKE ONE CAPSULE BY MOUTH TWICE

A DAY      TAKE ONE CAPSULE BY MOUTH TWICE A DAY SOLD: 10/20/2021               

                   [facility]

 

          400 mg              2021 12:00:00 AM EDT capsule   90           

       TAKE ONE CAPSULE BY MOUTH TWICE

A DAY      TAKE ONE CAPSULE BY MOUTH TWICE A DAY SOLD: 2021               

                   [facility]

 

          25 mg               2021 12:00:00 AM EDT tablet    90           

       TAKE ONE TABLET BY MOUTH EVERY 

DAY        TAKE ONE TABLET BY MOUTH EVERY DAY SOLD: 09/10/2021                  

                [facility]

 

          1,250 mcg (50,000 unit)           2021 12:00:00 AM EDT capsule  

 12                  TAKE ONE 

CAPSULE BY MOUTH WEEKLY TAKE ONE CAPSULE BY MOUTH WEEKLY SOLD: 09/10/2021       

                           

[facility]

 

          90 mcg/actuation           2021 12:00:00 AM EDT HFA aerosol inha

ler 8                   INHALE TWO

PUFFS BY MOUTH EVERY 6 HOURS AS NEEDED  INHALE TWO PUFFS BY MOUTH EVERY 6 HOURS 

AS NEEDED    SOLD: 2021                                        [facility]

[facility]

 

                          Amlodipine 5 MG / Benazepril hydrochloride 10 MG Oral 

Capsule 5-10 mg AMLODIPINE

BESYLATE/BENAZEPRIL 2021 12:00:00 AM EDT capsule      90                  

      TAKE ONE CAPSULE BY 

MOUTH EVERY DAY TAKE ONE CAPSULE BY MOUTH EVERY DAY SOLD: 2021            

                      

[facility]

 

                                        Brimonidine tartrate 2 MG/ML / Timolol 5

 MG/ML Ophthalmic Solution [Combigan] 

0.2-0.5 %  BRIMONIDINE TARTRATE/TIMOLOL 2021 12:00:00 AM EDT drops      15

                    

INSTILL 1 DROP IN EACH EYE TWO TIMES A DAY INSTILL 1 DROP IN EACH EYE TWO TIMES 

A DAY        SOLD: 2021                                        [facility]

[facility]

 

                                        Brimonidine tartrate 2 MG/ML / Timolol 5

 MG/ML Ophthalmic Solution [Combigan] 

0.2-0.5 %  BRIMONIDINE TARTRATE/TIMOLOL 2021 12:00:00 AM EDT drops      15

                    

INSTILL 1 DROP IN EACH EYE TWO TIMES A DAY INSTILL 1 DROP IN EACH EYE TWO TIMES 

A DAY        SOLD: 2021                                        [facility]

[facility]

 

                                        Brimonidine tartrate 2 MG/ML / Timolol 5

 MG/ML Ophthalmic Solution [Combigan] 

0.2-0.5 %  BRIMONIDINE TARTRATE/TIMOLOL 2021 12:00:00 AM EDT drops      15

                    

INSTILL 1 DROP IN EACH EYE TWO TIMES A DAY INSTILL 1 DROP IN EACH EYE TWO TIMES 

A DAY        SOLD: 2021                                        [facility]

 

                                        Brimonidine tartrate 2 MG/ML / Timolol 5

 MG/ML Ophthalmic Solution [Combigan] 

Combigan 0.2-0.5% Ophthalmic Solution Combigan 0.2-0.5% Ophthalmic Solution 

2021 12:00:00 AM EDT                                         active       

           brimonidine tartrate 2 MG/ML / 

timolol 5 MG/ML Ophthalmic Solution [Combigan] FERN (Gavin Munguia MD 

[facility])

 

                                        travoprost 0.04 MG/ML Ophthalmic Solutio

n [Travatan] Travatan Z 0.004% 

Ophthalmic Solution       Travatan Z 0.004% Ophthalmic Solution 2021 12:00

:00 AM

 EDT          1                           aborted               travoprost 0.04 

MG/ML Ophthalmic Solution [Travatan] 

FERN (Gavin Munguia MD [facility])

 

                                        Brimonidine tartrate 2 MG/ML / Timolol 5

 MG/ML Ophthalmic Solution [Combigan] 

Combigan 0.2-0.5% Ophthalmic Solution Combigan 0.2-0.5% Ophthalmic Solution 

2021 12:00:00 AM EDT                                         aborted      

           brimonidine tartrate 2 MG/ML / 

timolol 5 MG/ML Ophthalmic Solution [Combigan] FERN (Gavin Munguia MD 

[facility])

 

          2.5 mg              2021 12:00:00 AM EDT tablet extended release

 24hr 90                  TAKE ONE 

TABLET BY MOUTH EVERY DAY TAKE ONE TABLET BY MOUTH EVERY DAY SOLD: 2021   

              

                                                    [facility]

 

          0.4 mg              2021 12:00:00 AM EDT capsule   90           

       TAKE ONE CAPSULE BY MOUTH EVERY

 DAY       TAKE ONE CAPSULE BY MOUTH EVERY DAY SOLD: 2021                 

                 [facility]

 

                                        Brimonidine tartrate 2 MG/ML / Timolol 5

 MG/ML Ophthalmic Solution [Combigan] 

Combigan 0.2-0.5% Ophthalmic Solution Combigan 0.2-0.5% Ophthalmic Solution 

06/15/2021 12:00:00 AM EDT                                         aborted      

           brimonidine tartrate 2 MG/ML / 

timolol 5 MG/ML Ophthalmic Solution [Combigan] FERN (Gavin Munguia MD 

[facility])

 

                                        Brimonidine tartrate 2 MG/ML / Timolol 5

 MG/ML Ophthalmic Solution [Combigan] 

0.2-0.5 %  BRIMONIDINE TARTRATE/TIMOLOL 06/15/2021 12:00:00 AM EDT drops      5 

                    

INSTILL 1 DROP IN EACH EYE TWO TIMES A DAY INSTILL 1 DROP IN EACH EYE TWO TIMES 

A DAY        SOLD: 2021                                        [facility]

[facility]

 

          400 mg              2021 12:00:00 AM EDT capsule   90           

       TAKE ONE CAPSULE BY MOUTH TWICE

 A DAY     TAKE ONE CAPSULE BY MOUTH TWICE A DAY SOLD: 2021               

                   [facility]

 

          400 mg              2021 12:00:00 AM EDT capsule   90           

       TAKE ONE CAPSULE BY MOUTH TWICE

 A DAY     TAKE ONE CAPSULE BY MOUTH TWICE A DAY SOLD: 2021               

                   [facility]

 

          1,250 mcg (50,000 unit)           2021 12:00:00 AM EDT capsule  

 12                  TAKE 1 CAPSULE

 BY MOUTH ONCE A WEEK TAKE 1 CAPSULE BY MOUTH ONCE A WEEK SOLD: 2021      

                      

                                        [facility]

 

          25 mg               2021 12:00:00 AM EDT tablet    180          

       TAKE TWO TABLETS BY MOUTH EVERY 

DAY        TAKE TWO TABLETS BY MOUTH EVERY DAY SOLD: 2021                 

                 [facility]

 

                                        Brimonidine tartrate 2 MG/ML / Timolol 5

 MG/ML Ophthalmic Solution [Combigan] 

Combigan 0.2-0.5% Ophthalmic Solution Combigan 0.2-0.5% Ophthalmic Solution 

05/10/2021 12:00:00 AM EDT                                         aborted      

           brimonidine tartrate 2 MG/ML / 

timolol 5 MG/ML Ophthalmic Solution [Combigan] FERN (Gavin Munguia MD 

[facility])

 

                                        Brimonidine tartrate 2 MG/ML / Timolol 5

 MG/ML Ophthalmic Solution [Combigan] 

0.2-0.5 %  BRIMONIDINE TARTRATE/TIMOLOL 05/10/2021 12:00:00 AM EDT drops      5 

                    

INSTILL 1 DROP TWO TIMES A DAY IN BOTH EYES INSTILL 1 DROP TWO TIMES A DAY IN 

BOTH EYES    SOLD: 2021                                        [facility]

[facility]

 

                36,000-114,000- 180,000 unit                 2021 12:00:00

 AM EDT capsule,delayed 

release(DR/EC)  90                              TAKE ONE CAPSULE BY MOUTH THREE 

TIMES A DAY WITH EACH MEAL 

TAKE ONE CAPSULE BY MOUTH THREE TIMES A DAY WITH EACH MEAL SOLD: 2021     

                            

                                        [facility]

 

                36,000-114,000- 180,000 unit                 2021 12:00:00

 AM EDT capsule,delayed 

release(DR/EC)  90                              TAKE ONE CAPSULE BY MOUTH THREE 

TIMES A DAY WITH EACH MEAL 

TAKE ONE CAPSULE BY MOUTH THREE TIMES A DAY WITH EACH MEAL SOLD: 09/10/2021     

                            

                                        [facility]

 

                36,000-114,000- 180,000 unit                 2021 12:00:00

 AM EDT capsule,delayed 

release(DR/EC)  90                              TAKE ONE CAPSULE BY MOUTH THREE 

TIMES A DAY WITH EACH MEAL 

TAKE ONE CAPSULE BY MOUTH THREE TIMES A DAY WITH EACH MEAL SOLD: 2021     

                            

                                        [facility]

 

                36,000-114,000- 180,000 unit                 2021 12:00:00

 AM EDT capsule,delayed 

release(DR/EC)  90                              TAKE ONE CAPSULE BY MOUTH THREE 

TIMES A DAY WITH EACH MEAL 

TAKE ONE CAPSULE BY MOUTH THREE TIMES A DAY WITH EACH MEAL SOLD: 2021     

                            

                                        [facility]

 

                          Amlodipine 5 MG / Benazepril hydrochloride 10 MG Oral 

Capsule 5-10 mg AMLODIPINE

 BESYLATE/BENAZEPRIL 2021 12:00:00 AM EDT capsule      90                 

       TAKE ONE CAPSULE BY

 MOUTH EVERY DAY TAKE ONE CAPSULE BY MOUTH EVERY DAY SOLD: 04/15/2021           

                       

[facility]

 

          2.5 mg              2021 12:00:00 AM EDT tablet extended release

 24hr 90                  TAKE ONE 

TABLET BY MOUTH EVERY DAY TAKE ONE TABLET BY MOUTH EVERY DAY SOLD: 2021   

              

                                                    [facility]

 

          0.4 mg              03/15/2021 12:00:00 AM EDT capsule   90           

       TAKE ONE CAPSULE BY MOUTH EVERY

 DAY       TAKE ONE CAPSULE BY MOUTH EVERY DAY SOLD: 2021                 

                 [facility]

 

          Atenolol 25 MG Oral Tablet ATENOLOL  03/15/2021 12:00:00 AM EDT tablet

    180                 TAKE

 TWO TABLETS BY MOUTH EVERY DAY TAKE TWO TABLETS BY MOUTH EVERY DAY SOLD: 

2021                                                      [facility]

 

          1,250 mcg (50,000 unit)           2021 12:00:00 AM EST capsule  

 12                  TAKE ONE 

CAPSULE BY MOUTH ONCE WEEKLY TAKE ONE CAPSULE BY MOUTH ONCE WEEKLY SOLD: 

2021                                                      [facility]

 

          400 mg              2021 12:00:00 AM EST capsule   90           

       TAKE ONE CAPSULE BY MOUTH TWICE

 A DAY     TAKE ONE CAPSULE BY MOUTH TWICE A DAY SOLD: 2021               

                   [facility]

 

          400 mg              2021 12:00:00 AM EST capsule   90           

       TAKE ONE CAPSULE BY MOUTH TWICE

 A DAY     TAKE ONE CAPSULE BY MOUTH TWICE A DAY SOLD: 2021               

                   [facility]

 

                atorvastatin 20 MG Oral Tablet ATORVASTATIN CALCIUM 2021 1

2:00:00 AM EST 

tablet          45                              TAKE ONE TABLET BY MOUTH EVERY O

THER DAY TAKE ONE TABLET BY MOUTH 

EVERY OTHER DAY SOLD: 2021                                        [facility]

 

                atorvastatin 20 MG Oral Tablet ATORVASTATIN CALCIUM 2021 1

2:00:00 AM EST 

tablet          45                              TAKE ONE TABLET BY MOUTH EVERY O

THER DAY TAKE ONE TABLET BY MOUTH 

EVERY OTHER DAY SOLD: 2021                                        [facility]

 

                atorvastatin 20 MG Oral Tablet ATORVASTATIN CALCIUM 2021 1

2:00:00 AM EST 

tablet          45                              TAKE ONE TABLET BY MOUTH EVERY O

THER DAY TAKE ONE TABLET BY MOUTH 

EVERY OTHER DAY SOLD: 2021                                        [facility]

 

                atorvastatin 20 MG Oral Tablet ATORVASTATIN CALCIUM 2021 1

2:00:00 AM EST 

tablet          45                              TAKE ONE TABLET BY MOUTH EVERY O

THER DAY TAKE ONE TABLET BY MOUTH 

EVERY OTHER DAY SOLD: 2021                                        [facility]

 

          BLOOD SUGAR DIAGNOSTIC           2021 12:00:00 AM EST strip     

200                 USE AS DIRECTED 

TO TEST TWO TIMES A DAY   USE AS DIRECTED TO TEST TWO TIMES A DAY SOLD: 20

21

                                                                [facility]

 

          2.5 mg              2021 12:00:00 AM EST tablet extended release

 24hr 90                  TAKE ONE 

TABLET BY MOUTH EVERY DAY TAKE ONE TABLET BY MOUTH EVERY DAY SOLD: 2021   

              

                                                    [facility]

 

          5-10 mg             2021 12:00:00 AM EST capsule   90           

       TAKE ONE CAPSULE BY MOUTH 

EVERY DAY  TAKE ONE CAPSULE BY MOUTH EVERY DAY SOLD: 2021                 

                 [facility]

 

          1,250 mcg (50,000 unit)           2020 12:00:00 AM EST capsule  

 12                  TAKE ONE 

CAPSULE BY MOUTH ONCE WEEKLY TAKE ONE CAPSULE BY MOUTH ONCE WEEKLY SOLD: 

2020                                                      [facility]

 

          400 mg              2020 12:00:00 AM EST capsule   90           

       TAKE ONE CAPSULE BY MOUTH TWICE

 A DAY     TAKE ONE CAPSULE BY MOUTH TWICE A DAY SOLD: 2021               

                   [facility]

 

          400 mg              2020 12:00:00 AM EST capsule   90           

       TAKE ONE CAPSULE BY MOUTH TWICE

 A DAY     TAKE ONE CAPSULE BY MOUTH TWICE A DAY SOLD: 2020               

                   [facility]

 

          0.4 mg              2020 12:00:00 AM EDT capsule   90           

       TAKE ONE CAPSULE BY MOUTH EVERY

 DAY       TAKE ONE CAPSULE BY MOUTH EVERY DAY SOLD: 2020                 

                 [facility]

 

          Atenolol 25 MG Oral Tablet ATENOLOL  2020 12:00:00 AM EDT tablet

    180                 TAKE

 TWO TABLETS BY MOUTH EVERY DAY TAKE TWO TABLETS BY MOUTH EVERY DAY SOLD: 

2020                                                      [facility]

 

          25 mg               2020 12:00:00 AM EDT tablet    90           

       TAKE ONE TABLET BY MOUTH EVERY 

DAY        TAKE ONE TABLET BY MOUTH EVERY DAY SOLD: 2021                  

                [facility]

 

          5-10 mg             2020 12:00:00 AM EDT capsule   90           

       TAKE ONE CAPSULE BY MOUTH 

EVERY DAY  TAKE ONE CAPSULE BY MOUTH EVERY DAY SOLD: 10/13/2020                 

                 [facility]

 

          400 mg              2020 12:00:00 AM EDT capsule   90           

       TAKE ONE CAPSULE BY MOUTH TWICE

 A DAY     TAKE ONE CAPSULE BY MOUTH TWICE A DAY SOLD: 10/13/2020               

                   [facility]

 

          0.2-0.5 %           2020 12:00:00 AM EDT drops     15           

       INSTILL 1 DROP INTO BOTH EYES 

TWICE A DAY INSTILL 1 DROP INTO BOTH EYES TWICE A DAY SOLD: 2021          

                        

[facility]

 

                                        Brimonidine tartrate 2 MG/ML / Timolol 5

 MG/ML Ophthalmic Solution [Combigan] 

Combigan 0.2-0.5% Ophthalmic Solution Combigan 0.2-0.5% Ophthalmic Solution 

2020 12:00:00 AM EDT                                         aborted      

           brimonidine tartrate 2 MG/ML / 

timolol 5 MG/ML Ophthalmic Solution [Combigan] FERN (Gavin Munguia MD 

[facility])

 

          0.2-0.5 %           2020 12:00:00 AM EDT drops     15           

       INSTILL 1 DROP INTO BOTH EYES 

TWICE A DAY INSTILL 1 DROP INTO BOTH EYES TWICE A DAY SOLD: 2020          

                        

[facility]

 

                atorvastatin 20 MG Oral Tablet ATORVASTATIN CALCIUM 2020 1

2:00:00 AM EST 

tablet          45                              TAKE ONE TABLET BY MOUTH EVERY O

THER DAY TAKE ONE TABLET BY MOUTH 

EVERY OTHER DAY SOLD: 12/10/2020                                        [facility]

 

                                        travoprost 0.04 MG/ML Ophthalmic Solutio

n [Travatan] Travatan Z 0.004% 

Ophthalmic Solution       Travatan Z 0.004% Ophthalmic Solution 2019 12:00

:00 AM

 EST          1                           aborted               travoprost 0.04 

MG/ML Ophthalmic Solution [Travatan] 

FERN (Gavin Munguia MD [facility])



                                                                                
                                                                                
                                                                                
                                                                                
                                                                                
                                                                                
                                                                                
                                                                                
                                                                    



Insurance Providers

          



             Payer name   Policy type / Coverage type Policy ID    Covered party

 ID Covered 

party's relationship to mckeon Policy Mckeon             Plan Information

 

          [employer] R[employer]K [employer]NAGEMENT           [id number]           WI2           

      [id number]

 

          [employer]               [id number]           WI2                 [id number]

 

          MEDICARE  A         [id number]           Self                [id number]

[id number]

 

          MEDICARE            [id number]           SP                  [id number]

[id number]

 

                [place] [id number]     

[id number] Self                                    

[id number]

 

          MEDICARE            [id number]           SP                  [id number]

A

 

                [place]are [place] Medicare Primary [id number]     

[id number] Self                                    

[id number]

 

                [place]are [place] Medicare Primary [id number]     

[id number] Self                                    

[id number]

 

                Med[place]are [place] Medicare Primary [id number]     

[id number] Self                                    

[id number]

 

          MEDICARE            [id number]           SP                  [id number]

[id number]

 

                Med[place]are [place] Medicare Primary [id number]     

[id number] Self                                    

[id number]

 

          Med[place]are [place] Medicare Primary           [id number]    Self            

     

 

          [employer] (pr) Medigap Part B           [id number]    Family Dependent        

    

 

          [facility]       U         [id number]           Spouse              [id number]

 

             [employer] (pr)   Medigap Part B [id number]    [id number]

.[id number] 

Family Dependent                                    [id number]

 

             [employer] (pr)   Medigap Part B [id number]    [id number]

.[id number] 

Family Dependent                                    [id number]

 

             [employer] (pr)   Medigap Part B [id number]    [id number]

.[id number] 

Family Dependent                                    [id number]

 

             [employer] (pr)   Medigap Part B [id number]    [id number]

.[id number] 

Family Dependent                                    [id number]

 

             [employer] (pr)   Medigap Part B [id number]    [id number]

.[id number] 

Family Dependent                                    [id number]

 

                Medicare [place] Medicare Primary [id number]      

[id number] Self                                    

[id number]

 

             [employer] (pr)   Medigap Part B [id number]    [id number]

.[id number] 

Family Dependent                                    [id number]

 

                Medicare [place] Medicare Primary [id number]      

[id number] Self                                    

[id number]

 

             [employer] (pr)   Medigap Part B [id number]    [id number]

.[id number] 

Family Dependent                                    [id number]

 

          [facility]       U         [id number]           Spouse              [id number]

 

          SELF PAY ONLY           [id number]           SP                  [id number]

[id number]

 

                Um[employer] (pr)        Medigap Part B  [id number]

[id number] 

[id number] Family Dependent                        

[id number]

 

          MEDICARE  MCA       [id number] S                   [id number]

[id number]

 

          [facility]           [id number]           SP               

   [id number]

 

          [facility]       HEA       [id number] SP                  [id number]

 

                Umr (pr)        Medigap Part B  [id number]

[id number] 

[id number] Family Dependent                        

[id number]

 

          MEDICARE  MCA       [id number] S                   [id number]

[id number]

 

          [employer] PPO O         [id number] P                   [id number]

 

          MEDICARE  C         [id number] S                   [id number]

A

 

          Medicare Part B of [facility] Other     0         [id number] 

Self                0

 

          [employer] Other     0         [id number] Family Dep

endent Torri Dianelys 0

 

             Umr          Commercial   [id number]  [id number]

[id number] Family 

Dependent                                           [id number]

 

                Medicare [place] Medicare Primary [id number]     

[id number] Self                                    

[id number]

 

             [employer]        Medigap Part B [id number]    [id number]

.[id number] Family 

Dependent                                           [id number]

 

                Medicare [place] Medicare Primary [id number]      

[id number] Self                                    

[id number]

 

          Medicare Part B of [facility] Other     0         [id number] S

elf                0

 

          [employer]               [id number]           WI2                 [id number]

 

          [facility]           [id number]           WI2              

   [id number]

 

          MEDICARE PART A             -O/P           [id number]           18   

               [id number]

 

          [facility]                         -O/P           [id number]           [id number]     

             [id number]

 

          [employer] Other     0         [id number] Family Dep

endent Torri Dianelys 0

 

          Medicare Part B of [facility] Other     0         [id number] 

Self                0

 

                Umr (pr)        Medigap Part B  [id number]

[id number] 

[id number] Family Dependent                        

[id number]

 

          [facility]       O         [id number] P                   [id number]

 

                Umr (pr)        [facility] Part B  [id number]

[id number] 

[id number] Family Dependent                        

[id number]

 

          MEDICARE  C         [id number] S                   [id number]

[id number]

 

             Umr          Commercial   [id number]  [id number]

[id number] Family 

Dependent                                           [id number]

 

                Medicare [place] Medicare Primary [id number]     

[id number] Self                                    

[id number]

 

                Umr (pr)        [facility] Part B  [id number]

[id number] 

[id number] Family Dependent                        

[id number]

 

          [facility]       O         [id number] S                   [id number]



                                                                                
                                                                                
                                                                                
                                                                                
                                                                                
                                                                                
                                                                                
                                                          



Problems, Conditions, and Diagnoses

          No Information                                                        
                                



Surgeries/Procedures

          



             Procedure    Description  Date         Indications  Data Source(s)

 

             OFFICE OUTPATIENT VISIT 15 MINUTES              2021 12:00:00

 AM EDT              [facility] 

([facility])

 

             OFFICE OUTPATIENT VISIT 15 MINUTES              10/27/2021 12:00:00

 AM EDT              [facility] 

([facility])

 

             OFFICE OUTPATIENT VISIT 15 MINUTES              10/20/2021 12:00:00

 AM EDT              [facility] 

([facility])

 

             OFFICE OUTPATIENT VISIT 25 MINUTES              2021 12:00:00

 AM EDT              [facility] 

([facility])

 

                    Excise Malig Lesion  .6-1CM Face/Ear/Eyelid/Nose/Lip        

             2021 12:00:00 AM 

EDT                                                 [facility] ([facility]

tiffanie, )

 

             Shave Biopsy Of Skin, Single Lesion              2021 12:00:0

0 AM EDT              [facility] 

([facility])

 

             OFFICE OUTPATIENT VISIT 25 MINUTES              2021 12:00:00

 AM EDT              [facility] 

([facility])

 

                    History finding (finding) No significant past surgical histo

ry 2021 

12:00:00 AM EDT                                     FERN (Gavin Munguia MD [facility])

 

                    Duplex Scan Aorta/Inf Vena Cava/Iliac Vasc/Bypass GRFT LTD/F

ol-Up                     2021 

12:00:00 AM EDT                                     [facility] ([facility])

 

             OFFICE OUTPATIENT VISIT 15 MINUTES              2021 12:00:00

 AM EDT              [facility] 

([facility])

 

             THERAPEUTIC PX 1/> AREAS EACH 15 MIN EXERCISES              

021 12:00:00 AM EDT              

[facility] ([facility] )

 

             Physical Therapy Eval - Low Complexity              2021 12:0

0:00 AM EDT              [facility] 

([facility])

 

             ARTHROCENTESIS ASPIR&/INJECTION MAJOR JT/BURSA              

021 12:00:00 AM EDT              

[facility] ([facility])

 

             RADEX SHOULDER COMPLETE MINIMUM 2 VIEWS              2021 12:

00:00 AM EDT              [facility] 

([facility])

 

             DESTRUCTION PREMALIGNANT LESION 1ST              2021 12:00:0

0 AM EDT              [facility] 

([facility])

 

             DESTRUCTION PREMALIGNANT LESION 2-14 EA              2021 12:

00:00 AM EDT              [facility] 

([facility])



                                                                                
                                                                                
                                                                              



Results

          



                    ID                  Date                Data Source

 

                    [id number]                 11/15/2021 12:00:00 AM EST [facility]









          Name      Value     Range     Interpretation Code Description Data Sejal

rce(s) Supporting 

Document(s)

 

          [facility] Antigen Negative                                [facility]

     

 

                                        [facility]

[facility] and reported by [facility]. 









                    ID                  Date                Data Source

 

                    [id number]         2021 01:35:00 PM EDT [facility] ([facility], )









          Name      Value     Range     Interpretation Code Description Data Sejal

rce(s) Supporting 

Document(s)

 

           Surgical pathology study Laboratory test result                      

            [facility] ([facility], )                            

 

                                        FINAL DIAGNOSIS



Left cheek, lesion, excision:

Skin with a few prominent hair follicles and actinic damage.

No malignancy is identified.

                                        2021 - 1105



CLINICAL DIAGNOSIS



SCC left cheek

                                        2021



GROSS DIAGNOSIS



Received in formalin labeled "left cheek" is a 0.9 x 0.4 x 0.4 cm.

ellipsoid portion of skin.  It is inked, bisected and submitted all in

one.

                                        -

                                        2021 - 



Signed________ Bay Sierra MD 2021 1220

 









                    ID                  Date                Data Source

 

                    [id number]              2021 04:07:00 PM EDT [facility] ([facility])









          Name      Value     Range     Interpretation Code Description Data Sejal

rce(s) Supporting 

Document(s)

 

           Laboratory test finding (navigational concept) Laboratory test result

                                  

[facility] ([facility])    

 

                                        Refer Dr Jameson letter awaiting signatu

re LW 21 photo emailed, letter sent

 awaiting appt lw 21 wife called inquiring on appt with Dr Jameson, I spoke
 with Devika and Dr Jameson is out for 2 weeks and Mervin is out this week.  She
 will call his wife and get him scheduled  LW 21 Patient's wife came into 
the office today and wanted referral resent to Dr Zayas and he is scheduled 
for 21 at 11:10, letter redone awaiting signature LW 21 need to cancel 
 referral with Dr Jameson photo emailed, letter sent to BETTY [place] 21
 

 

           Laboratory test finding (navigational concept) Laboratory test result

                                  

[facility] ([facility])    

 

                                        Refer Dr Jameson letter awaiting signatu

re LW 21 photo emailed, letter sent

 awaiting appt lw 21 wife called inquiring on appt with Dr Jameson, I spoke
 with Devika and Dr Jameson is out for 2 weeks and Mervin is out this week.  She
 will call his wife and get him scheduled  LW 21 Patient's wife came into 
the office today and wanted referral resent to Dr Zayas and he is scheduled 
for 21 at 11:10, letter redone awaiting signature LW 21 need to cancel 
 referral with Dr Jameson photo emailed, letter sent to BETTY [place] 21
 









                    ID                  Date                Data Source

 

                    [id number]              2021 04:07:00 PM EDT [facility]









          Name      Value     Range     Interpretation Code Description Data Sejal

rce(s) Supporting 

Document(s)

 

           Laboratory test finding (navigational concept) Laboratory test result

                                  

ALEXEY ([facility])    









                    ID                  Date                Data Source

 

                    [id number]            2021 05:46:00 PM EDT [facility]

 

                                        DATE OF EXAM: 1ABDOMEN SINGLE V

IEW INDICATION: Incontinence. TECHNIQUE:

 Two supine films of the abdomen were obtained. FINDINGS: Gas is seen diffusely 
throughout nondilated loops of large and small bowel.  There are no dilated 
loops of bowel.  No abnormal abdominal calcifications are present.  Prostate 
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  The lung bases
 are clear.  Scoliosis and lumbar spine degenerative changes are present. 
IMPRESSION: Nonobstructive bowel gas pattern. Professional interpretation 
performed at [facility] [phone].End of diagnostic report for 
accession:  [id number] Interpreted: Denise Peter MDTranscribed: 2021
 05:45 PMSigned:      2021 05:46 PM  Denise Peter MD    
-------------------------------------
-------------------------------------------MRN:  [id number] [facility] #  
[id number] [facility] # [id number] 









          Name      Value     Range     Interpretation Code Description Data Sejal

rce(s) Supporting 

Document(s)

 

                                                                       







                                        Procedure

 

                                          



                                                                                
                                                          



Social History

          No Information                                                        
                                



Vital Signs

          



                    ID                  Date                Data Source

 

                    UNK                                      









           Name       Value      Range      Interpretation Code Description Data

 Source(s)

 

           Oxygen saturation in Arterial blood by Pulse oximetry 95 %           

                  95 %       [facility]

 

           Heart rate 60 /min                          60 /min    [facility]

 

           Systolic blood pressure 131 mm[Hg]                       131 mm[Hg] M

DANELLE ([facility]

 

           Diastolic blood pressure 49 mm[Hg]                        49 mm[Hg]  

[facility]

 

           Systolic blood pressure 150 mm[Hg]                       150 mm[Hg] M

[facility]

 

           Oxygen saturation in Arterial blood by Pulse oximetry 95 %           

                  95 %       [facility] 

([facility])

 

           Heart rate 53 /min                          53 /min    [facility] ([facility]

[facility])

 

           Diastolic blood pressure 58 mm[Hg]                        58 mm[Hg]  

[facility] ([facility]

 

           Systolic blood pressure 151 mm[Hg]                       151 mm[Hg] M

[facility] ([facility])

 

           Diastolic blood pressure 77 mm[Hg]                        77 mm[Hg]  

[facility] ([facility])

 

           Heart rate 61 /min                          61 /min    [facility] ([facility]

 

           Body weight 150.00 [lb_av]                       150.00 [lb_av] [facility]

T ([facility]

 

           Ideal body weight 172 [lb_av]                       172 [lb_av] MEDEN

T ([facility])

 

           Body weight 69.401 kg                        69.401 kg  [facility] ([facility])

 

           Body surface area Derived from formula 1.88 m2                       

   1.88 m2    [facility] ([facility])

 

           Systolic blood pressure 138 mm[Hg]                       138 mm[Hg] [facility] ([facility])

 

           Diastolic blood pressure 66 mm[Hg]                        66 mm[Hg]  

[facility] ([facility])

 

           Body temperature 98.9 [degF]                       98.9 [degF] [facility]

 ([facility])

 

           Body height 71 [in_i]                        71 [in_i]  [facility] ([facility])

 

                                        5'11" 

 

           Body weight 153.00 [lb_av]                       153.00 [lb_av] MED[facility]

T ([facility])

 

           Body mass index (BMI) [Ratio] 21.3 kg/m2                       21.3 k

g/m2 [facility] ([facility])

 

           Body mass index (BMI) [Ratio] 21.5 kg/m2                       21.5 k

g/m2 [facility] ([facility])

 

           Systolic blood pressure 173 mm[Hg]                       173 mm[Hg] [facility] ([facility])

 

           Diastolic blood pressure 62 mm[Hg]                        62 mm[Hg]  

[facility] ([facility])

 

           Body temperature 98.4 [degF]                       98.4 [degF] [facility]

 ([facility])

 

           Body height 71 [in_i]                        71 [in_i]  [facility] ([facility])

 

                                        5'11" 

 

           Ideal body weight 172 [lb_av]                       172 [lb_av] [facility]

T ([facility])

 

           Body weight 69.854 kg                        69.854 kg  [facility] ([facility])

 

           Body surface area Derived from formula 1.89 m2                       

   1.89 m2    [facility]

 

           Body weight 154.00 [lb_av]                       154.00 [lb_av] MED[facility]

T ([facility])

 

           Respiratory rate 17 /min                          17 /min    [facility] (

[facility])

 

           Body weight 150.00 [lb_av]                       150.00 [lb_av] [facility]

T ([facility]

 

           Systolic blood pressure 148 mm[Hg]                       148 mm[Hg] M

[facility] ([facility]

 

           Diastolic blood pressure 78 mm[Hg]                        78 mm[Hg]  

[facility] ([facility]

 

           Body weight 150.00 [lb_av]                       150.00 [lb_av] [facility]

T ([facility]

 

           Respiratory rate 17 /min                          17 /min    [facility] (

[facility])

 

           Diastolic blood pressure 60 mm[Hg]                        60 mm[Hg]  

[facility] ([facility])

 

           Systolic blood pressure 150 mm[Hg]                       150 mm[Hg] M

[facility]

 

           Body temperature 96.8 [degF]                       96.8 [degF] [facility]

 ([facility])

 

           Body height 68.5 [in_i]                       68.5 [in_i] [facility]

 

                                        5'8.50" 

 

           Body mass index (BMI) [Ratio] 21.6 kg/m2                       21.6 k

g/m2 [facility] ([facility]

 

           Body temperature 97.3 [degF]                       97.3 [degF] [facility]

 ([facility] 

)

 

           Body weight 144.50 [lb_av]                       144.50 [lb_av] MED[facility]

T ([facility] 

)

 

           Diastolic blood pressure 66 mm[Hg]                        66 mm[Hg]  

[facility] ([facility]

 

           Systolic blood pressure 151 mm[Hg]                       151 mm[Hg] M

[facility] ([facility]

 

           Body temperature 96.8 [degF]                       96.8 [degF] [facility]

 ([facility]

 

           Body weight 155.00 [lb_av]                       155.00 [lb_av] [facility]

T ([facility]

 

           Diastolic blood pressure 62 mm[Hg]                        62 mm[Hg]  

[facility] ([facility])

 

           Systolic blood pressure 148 mm[Hg]                       148 mm[Hg] MELODY

THENA ([facility])

 

           Body height 72 [in_i]                        72 [in_i]  [facility] ([facility])

 

           Body height 72 [in_i]                        72 [in_i]  [facility] ([facility])

 

           Body height 72 [in_i]                        72 [in_i]  [facility] ([facility])



                                                                                
                  



Patient Treatment Plan of Care

          



             Planned Activity Planned Date Details      Description  Data Source

(s)

 

                          Brimonidine tartrate 2 MG/ML / Timolol 5 MG/ML Ophthal

jesika Solution [Combigan] 

2021 12:00:00 AM EDT                                         [facility]ERN (Lino Munguia MD [facility])

 

                    travoprost 0.04 MG/ML Ophthalmic Solution [Travatan] 

021 12:00:00 AM EDT 

                                                    FERN (Gavin Munguia MD [facility])

 

                          Brimonidine tartrate 2 MG/ML / Timolol 5 MG/ML Ophthal

jesika Solution [Combigan] 

2021 12:00:00 AM EDT                                         [facility]ERN (Lino Munguia MD [facility])

 

                          Brimonidine tartrate 2 MG/ML / Timolol 5 MG/ML Ophthal

jesika Solution [Combigan] 

06/15/2021 12:00:00 AM EDT                                         [facility] (Lino Munguia MD [facility])

 

                          Brimonidine tartrate 2 MG/ML / Timolol 5 MG/ML Ophthal

jesika Solution [Combigan] 

05/10/2021 12:00:00 AM EDT                                         [facility] (Lino Munguia [facility])

 

                          Brimonidine tartrate 2 MG/ML / Timolol 5 MG/ML Ophthal

jesika Solution [Combigan] 

2020 12:00:00 AM EDT                                         [facility]ERN (Lino Munguia [facility])

 

                    travoprost 0.04 MG/ML Ophthalmic Solution [Travatan] 

019 12:00:00 AM EST 

                                                    [facility]ERN (Gavin Munguia MD [facility])

## 2021-12-04 NOTE — CCD
Continuity of Care Document (CCD)

                             Created on: 2021



Preston Ivory

External Reference #: [id number]

: 1935

Sex: Male



Demographics





                          Address                   [address] [place]  [address]

 

                          Home Phone                [phone]

 

                          Preferred Language        Unknown

 

                          Marital Status            Unknown

 

                          Restoration Affiliation     Unknown

 

                          Race                      Unknown

 

                          Ethnic Group              Unknown





Author





                          Author                    [facility]

 

                          Organization              Unknown

 

                          Address                   [address]  [address], [address]

[address]

[place]  [address]



 

                          Phone                     [phone]







Care Team Providers





                    Care Team Member Name Role                Phone

 

                    Kei Smith MD   [facility]                [phone]







Problems





                                        Description

 

                                        No Information Available







Social History





                Type            Date            Description     Comments

 

                Birth Sex                       Unknown          

 

                Cigarette Use                   currently smokes 1/2 Pack Daily 

 

 

                ETOH Use                        Social Drinker   

 

                Tobacco Use     Start: Unknown  Patient is a current smoker, smo

kes every day  

 

                Sun Exposure                    minimum amount of sun exposure  

 

                Sun Exposure                    Has never used tanning bed  

 

                Sun Exposure                    Has never experienced blistering

 from sunburns  

 

                Sun Exposure                    Uses > 30 SPF    







Allergies and adverse reactions





             Active Allergies Criticality  Reaction | Severity Comments     Date

 

             sulfa        Unable to assess criticality                          

 04/10/2012







Medications





           Active Medications SIG        Qnty       Indications Ordering Provide

r Date

 

                          Fluorouracil                     5% Cream             

      apply to lateral 

forehead, temples and L ear sparingly twice a day 40gm                L57.0     

          Sadaf Isaac, 

F.N.P.                                  2021

 

                          Glipizide XL                     2.5mg Tablets ER 24HR

                   1 tab 

by mouth every day                                 Unknown         

 

                          Lipitor                     20mg Tablets              

     1 tab by mouth every 

day .                                           Unknown         

 

                                        Amlodipine Besylate/Benazepril Hydrochlo

ride                     5-20mg Capsules

                  1 tab by mouth every day                           Unknown    

  

 

                          Atenolol                     25mg Tablets             

      2 tabs by mouth 

every day                                       Unknown         

 

                          Tamsulosin HCL                     0.4mg Capsules     

              1 tab by 

mouth every day                                 Unknown         

 

                          Combigan                     0.2-0.5% Solution        

           1 in drop both 

eyes twice a day                                 Unknown         

 

                          Travatan Z                     0.004% Solution        

           1 drop in both 

eyes twice a day                                 Unknown         

 

                          Aspirin                     81mg Tablets DR           

        1 by mouth every 

day                                             Unknown         

 

           Procrit    1 shot as needed every 3-4 weeks                       Unk

nown    

 

                          Cinnamon                     500mg Tablets            

       1 tab by mouth 

every day                                       Unknown         

 

           Vitamin D2                                  Unknown    







Immunizations





                                        Description

 

                                        No Information Available







Vital Signs





                Date            Vital           Result          Comment

 

                2021 11:52am BP Systolic     131 mmHg         

 

                    BP Diastolic        49 mmHg              

 

                    O2 % BldC Oximetry  95 %                 

 

                    Heart Rate          60 /min              

 

                10/27/2021  2:59pm BP Systolic     150 mmHg         

 

                    BP Diastolic        58 mmHg              

 

                    O2 % BldC Oximetry  95 %                 

 

                    Heart Rate          53 /min              







Results





        Test    Acquired Date Facility Test    Result  H/L     Range   Note

 

                    [facility]      2021          [facility]

LC

             Icd9 Code    ICD9 Code: C44.3 <SEE NOTE>                           

 1, 2

 

             PDFReport    SEE IMAGE                               







                          1                         Refer Dr Jameson letter awai

ting signature LW 21 photo emailed, letter 

sent awaiting appt lw 21 wife called inquiring on appt with Dr Jameson, I 
spoke with Devika and Dr Jameson is out for 2 weeks and Mervin is out this week.
 She will call his wife and get him scheduled  LW 21 Patient's wife came 
into the office today and wanted referral resent to Dr Zayas and he is 
scheduled for 21 at 11:10, letter redone awaiting signature LW 21 need 
to cancel  referral with Dr Jameson photo emailed, letter sent to Dr Zayas,  
LW 21

 

                          2                         ICD9 Code: C44.329

Protocol: shave

Clinical Text: SCC

Final Diagnosis: SQUAMOUS CELL CARCINOMA, ACANTHOLYTIC TYPE, EXTENDING TO THE 
BOTTOM OF THE SECTIONS.

Gross Text: The specimen grossly was irregular in shape and measured 8 x 8 mm. 
on the surface and 2 mm. deep. It was divided into 3 sections on the long axis. 
All of the tissue was submitted for processing.

Microscopic Description: The corium is invaded by atypical keratinocytes with 
extensive acantholysis, and the lesion extends to the bottom of the sections.

CPT: 88575*1









Procedures





                Date            Code            Description     Status

 

                10/20/2021      40798           Office/Outpatient Established Lo

w MDM 20-29 Min Completed

 

                2021      95160           Office/Outpatient Established Mo

d MDM 30-39 Min Completed

 

                2021      80600           Office/Outpatient Established Mo

d MDM 30-39 Min Completed

 

                2021      94539           Shave Biopsy Of Skin, Single Les

ion Completed







[facility]





                                        Description

 

                                        No Information Available







Encounters





           Type       Date       Location   Provider   Dx         Diagnosis

 

           Office Visit 10/20/2021 11:30a Main Office SWATI Simmons L57.

0      Actinic 

keratosis

 

           Office Visit 2021 12:15p Main Office Sadaf HINDS'lino, F.N.P. 

L57.0      

Actinic keratosis

 

           Office Visit 2021  3:45p Main Office Sadaf HINDS'lino, F.N.P. 

D48.5      

Neoplasm of uncertain behavior of skin

 

                          L57.0                     Actinic keratosis

 

                          C44.320                   Squamous cell carcinoma of s

kin of unspecified parts of face







Assessments





                Date            Code            Description     Provider

 

                2021      L57.0           Actinic keratosis Sadaf O'br

ien, F.N.P.

 

                2021      R20.8           Other disturbances of skin sensa

tion Sadaf HINDS'lino, F.N.P.

 

                10/27/2021      L57.0           Actinic keratosis SWATI Simmons

 

                10/20/2021      L57.0           Actinic keratosis Sadaf O'br

ien, F.N.P.

 

                10/20/2021      L57.0           Actinic keratosis SWATI Simmons

 

                2021      L57.0           Actinic keratosis Sadaf O'br

ien, F.N.P.

 

                2021      D48.5           Neoplasm of uncertain behavior o

f skin Sadaf HINDS'lino, 

F.N.P.

 

                2021      L57.0           Actinic keratosis Sadaf O'br

ien, F.N.P.

 

                2021      C44.320         Squamous cell carcinoma of skin 

of unspecified parts of face 

Sadaf HINDS'lino, F.N.P.







Plan of Treatment

Future Appointment(s):* 2022 12:30 pm - Sadaf HINDS'lino, F.N.P. at Main 
  Office

2021 - Sadaf Isaac, F.N.P.* L57.0 Actinic keratosis* Comments:* Good
  response to EfudexContinue Efudex BID x 5 more days then discontinue.Knows to 
  wash hands after applying Efudex. Knows to expect redness, scaling, crusting, 
  swelling and/or blistering.   Instructed to not cover areas treated and to 
  avoid getting in eyes.  Continue Aquaphor after about 1 hour PRN for 
  comfort.Knows side effects include infection at site of application, headache,
  flu-like symptoms.  Call with any problems.





* R20.8 Other disturbances of skin sensation* Comments:* See above.



* Follow up:* Has appointment.









Functional Status





                                        Description

 

                                        No Information Available







Mental Status





                                        Description

 

                                        No Information Available







Referrals





                                        Description

 

                                        No Information Available

## 2021-12-04 NOTE — CCD
Continuity of Care Document (CCD)

                             Created on: 2021



Preston Ivory

External Reference #: [id number]

: 1935

Sex: Male



Demographics





                          Address                   [address] [place]  [address]

 

                          Home Phone                [phone]

 

                          Preferred Language        Unknown

 

                          Marital Status            Unknown

 

                          Yazidism Affiliation     Unknown

 

                          Race                      Unknown

 

                          Ethnic Group              Unknown





Author





                          Author                    Preston KAUFMAN F.N.P.

 

                          Organization              Unknown

 

                          Address                   [address]

[address]

[place]  [address]



 

                          Phone                     [phone]







Care Team Providers





                    Care Team Member Name Role                Phone

 

                    Kei Smith MD   [facility]                [phone]







Problems





                                        Description

 

                                        No Information Available







Social History





                Type            Date            Description     Comments

 

                Birth Sex                       Unknown          

 

                Cigarette Use                   currently smokes 1/2 Pack Daily 

 

 

                ETOH Use                        Social Drinker   

 

                Tobacco Use     Start: Unknown  Patient is a current smoker, smo

kes every day  

 

                Sun Exposure                    minimum amount of sun exposure  

 

                Sun Exposure                    Has never used tanning bed  

 

                Sun Exposure                    Has never experienced blistering

 from sunburns  

 

                Sun Exposure                    Uses > 30 SPF    







Allergies and adverse reactions





             Active Allergies Criticality  Reaction | Severity Comments     Date

 

             sulfa        Unable to assess criticality                          

 04/10/2012







Medications





           Active Medications SIG        Qnty       Indications Ordering Provide

r Date

 

                          Fluorouracil                     5% Cream             

      apply to lateral 

forehead, temples and L ear sparingly twice a day 40gm                L57.0     

          SCARLETT CoelloN.P.                                  2021

 

                          Glipizide XL                     2.5mg Tablets ER 24HR

                   1 tab 

by mouth every day                                 Unknown         

 

                          Lipitor                     20mg Tablets              

     1 tab by mouth every 

day .                                           Unknown         

 

                                        Amlodipine Besylate/Benazepril Hydrochlo

ride                     5-20mg Capsules

                  1 tab by mouth every day                           Unknown    

  

 

                          Atenolol                     25mg Tablets             

      2 tabs by mouth 

every day                                       Unknown         

 

                          Tamsulosin HCL                     0.4mg Capsules     

              1 tab by 

mouth every day                                 Unknown         

 

                          Combigan                     0.2-0.5% Solution        

           1 in drop both 

eyes twice a day                                 Unknown         

 

                          Travatan Z                     0.004% Solution        

           1 drop in both 

eyes twice a day                                 Unknown         

 

                          Aspirin                     81mg Tablets DR           

        1 by mouth every 

day                                             Unknown         

 

           Procrit    1 shot as needed every 3-4 weeks                       Unk

nown    

 

                          Cinnamon                     500mg Tablets            

       1 tab by mouth 

every day                                       Unknown         

 

           Vitamin D2                                  Unknown    







Immunizations





                                        Description

 

                                        No Information Available







Vital Signs





                Date            Vital           Result          Comment

 

                2021 11:52am BP Systolic     131 mmHg         

 

                    BP Diastolic        49 mmHg              

 

                    O2 % BldC Oximetry  95 %                 

 

                    Heart Rate          60 /min              

 

                10/27/2021  2:59pm BP Systolic     150 mmHg         

 

                    BP Diastolic        58 mmHg              

 

                    O2 % BldC Oximetry  95 %                 

 

                    Heart Rate          53 /min              







Results





        Test    Acquired Date Facility Test    Result  H/L     Range   Note

 

                    [facility]      2021          [facility]

LC

             Icd9 Code    ICD9 Code: C44.3 <SEE NOTE>                           

 1, 2

 

             PDFReport    SEE IMAGE                               







                          1                         Refer Dr Jameson letter awai

ting signature LW 21 photo emailed, letter 

sent awaiting appt lw 21 wife called inquiring on appt with Dr Jameson, I 
spoke with Devika and Dr Jameson is out for 2 weeks and Mervin is out this week.
 She will call his wife and get him scheduled  LW 21 Patient's wife came 
into the office today and wanted referral resent to Dr Zayas and he is 
scheduled for 21 at 11:10, letter redone awaiting signature LW 21 need 
to cancel  referral with Dr Jameson photo emailed, letter sent to Dr Zayas,  
LW 21

 

                          2                         ICD9 Code: C44.329

Protocol: shave

Clinical Text: SCC

Final Diagnosis: SQUAMOUS CELL CARCINOMA, ACANTHOLYTIC TYPE, EXTENDING TO THE 
BOTTOM OF THE SECTIONS.

Gross Text: The specimen grossly was irregular in shape and measured 8 x 8 mm. 
on the surface and 2 mm. deep. It was divided into 3 sections on the long axis. 
All of the tissue was submitted for processing.

Microscopic Description: The corium is invaded by atypical keratinocytes with 
extensive acantholysis, and the lesion extends to the bottom of the sections.

CPT: 41274*1









Procedures





                Date            Code            Description     Status

 

                2021      47234           Office/Outpatient Established [facility] 20-29 Min Completed

 

                10/27/2021      55197           Office/Outpatient Established Lo

w MDM 20-29 Min Completed

 

                10/20/2021      21051           Office/Outpatient Established Lo

w MDM 20-29 Min Completed

 

                2021      87275           Office/Outpatient Established Mo

d MDM 30-39 Min Completed

 

                2021      15650           Office/Outpatient Established Mo

d MDM 30-39 Min Completed

 

                2021      83633           Shave Biopsy Of Skin, Single Les

ion Completed







Medical Devices





                                        Description

 

                                        No Information Available







Encounters





           Type       Date       Location   Provider   Dx         Diagnosis

 

           Office Visit 2021 11:45a Main Office Sadaf HINDS'lino, F.N.P. 

L57.0      

Actinic keratosis

 

                          R20.8                     Other disturbances of skin s

ensation

 

           Office Visit 10/27/2021  3:00p Main Office SWATI Simmons L57.

0      Actinic 

keratosis

 

           Office Visit 10/20/2021 11:30a Main Office SWATI Simmons L57.

0      Actinic 

keratosis

 

           Office Visit 2021 12:15p Main Office Sadaf HINDS'lino, F.N.P. 

L57.0      

Actinic keratosis

 

           Office Visit 2021  3:45p Main Office Sadaf O'lino, F.N.P. 

D48.5      

Neoplasm of uncertain behavior of skin

 

                          L57.0                     Actinic keratosis

 

                          C44.320                   Squamous cell carcinoma of s

kin of unspecified parts of face







Assessments





                Date            Code            Description     Provider

 

                2021      L57.0           Actinic keratosis Sadaf O'br

ien, F.N.P.

 

                2021      R20.8           Other disturbances of skin sensa

tion Sadaf GUZMAN'lino, F.N.P.

 

                10/27/2021      L57.0           Actinic keratosis Sadaf O'br

ien, F.N.P.

 

                10/27/2021      L57.0           Actinic keratosis SWATI Simmons

 

                10/20/2021      L57.0           Actinic keratosis Sadaf O'br

ien, F.N.P.

 

                10/20/2021      L57.0           Actinic keratosis SWATI Simmons

 

                2021      L57.0           Actinic keratosis Sadaf O'br

ien, F.N.P.

 

                2021      D48.5           Neoplasm of uncertain behavior o

f skin SCARLETT CoelloN.P.

 

                2021      L57.0           Actinic keratosis Sadaf dunlap, F.N.P.

 

                2021      C44.320         Squamous cell carcinoma of skin 

of unspecified parts of face 

SCARLETT CoelloN.P.







Plan of Treatment

Future Appointment(s):* 2022 12:30 pm - NICKO Coello.N.P. at Main 
  Office

2021 - SCARLETT CoelloN.P.* L57.0 Actinic keratosis* Comments:* Good
  response to EfudexContinue Efudex BID x 5 more days then discontinue.Knows to 
  wash hands after applying Efudex. Knows to expect redness, scaling, crusting, 
  swelling and/or blistering.   Instructed to not cover areas treated and to 
  avoid getting in eyes.  Continue Aquaphor after about 1 hour PRN for 
  comfort.Knows side effects include infection at site of application, headache,
  flu-like symptoms.  Call with any problems.





* R20.8 Other disturbances of skin sensation* Comments:* See above.



* Follow up:* Has appointment.









Functional Status





                                        Description

 

                                        No Information Available







Mental Status





                                        Description

 

                                        No Information Available







Referrals





                                        Description

 

                                        No Information Available

## 2021-12-04 NOTE — CCD
Summarization Of Episode

                             Created on: 2021



LUMA WRIGHT

External Reference #: [id number]

: 1935

Sex: Undifferentiated



Demographics





                          Address                   [address] [place] [address]

 

                          Home Phone                [phone]

 

                          Preferred Language        English

 

                          Marital Status            Unknown

 

                          Hindu Affiliation     Unknown

 

                          Race                      Unknown

 

                          Ethnic Group              Not  or 





Author





                          Author                    [facility]

 

                          Organization              [facility]

 

                          Address                   Unknown

 

                          Phone                     Unavailable







Support





                Name            Relationship    Address         Phone

 

                TORRI WRIGHT Next Of Kin     Unknown         Unavailable

 

                MCKENNA WRIGHT   Next Of Kin     Unknown         [phone]

 

                    TORRI WRIGHT     Next Of Kin         [address] [place] [address] 

[place] [address]                    [phone]

 

                    SELF EMPLOYED       Next Of Kin         [address] [place] [address] 

[place] [address]                    [phone]

 

                RE              Next Of Kin     Unknown         Unavailable

 

                    EVGENY WRIGHT    Next Of Kin         [address] [place] [address]                    [phone]

 

                    DIANELYS E TORRI    ECON                [address] [place] [address]                    Unavailable







Care Team Providers





                    Care Team Member Name Role                Phone

 

                    MUNIRA Varela MD Unavailable         Unavailable

 

                    MUNIRA Varela MD Unavailable         Unavailable

 

                    MUNIRA Varela MD Unavailable         Unavailable

 

                    MUNIRA Varela MD Unavailable         Unavailable

 

                    MUNIRA Varela MD Unavailable         Unavailable

 

                    MUNIRA Varela MD Unavailable         Unavailable

 

                    MUNIRA Varela MD Unavailable         Unavailable

 

                    MUNIRA Varela MD Unavailable         Unavailable

 

                    MUNIRA Varela MD Unavailable         Unavailable

 

                    MUNIRA Varela MD Unavailable         Unavailable

 

                    MUNIRA Varela MD Unavailable         Unavailable

 

                    JUAN Wheatley MD Unavailable         Unavailable

 

                    JUAN Wheatley MD Unavailable         Unavailable

 

                    JUAN Wheatley MD Unavailable         Unavailable

 

                    JUAN Wheatley MD Unavailable         Unavailable

 

                    JUAN Wheatley MD Unavailable         Unavailable

 

                    JUAN Wheatley MD Unavailable         Unavailable

 

                    JUAN Wheatley MD Unavailable         Unavailable

 

                    JUAN Wheatley MD Unavailable         Unavailable

 

                    JUAN Wheatley MD Unavailable         Unavailable

 

                    JUAN Wheatley MD Unavailable         Unavailable

 

                    JUAN Wheatley MD Unavailable         Unavailable

 

                    JUAN Wheatley MD Unavailable         Unavailable

 

                    JUAN Wheatley MD Unavailable         Unavailable

 

                    JUAN Wheatley MD Unavailable         Unavailable

 

                    JUAN Wheatley MD Unavailable         Unavailable

 

                    JUAN Wheatley MD Unavailable         Unavailable

 

                    JUAN Wheatley MD Unavailable         Unavailable

 

                    BolJUAN rodríguez MD Unavailable         Unavailable

 

                    BolJUAN rodríguez MD Unavailable         Unavailable

 

                    BolJUAN rodríguez MD Unavailable         Unavailable

 

                    BollaJUAN MD Unavailable         Unavailable

 

                    BolJUAN rodríguez MD Unavailable         Unavailable

 

                    BolJUAN rodríguez MD Unavailable         Unavailable

 

                    Bolla, JUAN Ramos MD Unavailable         Unavailable

 

                    BolJUAN rodríguez MD Unavailable         Unavailable

 

                    Bolla, JUAN Ramos MD Unavailable         Unavailable

 

                    BolJUAN rodríguez MD Unavailable         Unavailable

 

                    Bolla, JUAN Ramos MD Unavailable         Unavailable

 

                    BolJUAN rodríguez MD Unavailable         Unavailable

 

                    BolJUAN rodríguez MD Unavailable         Unavailable

 

                    BolJUAN rodríguez MD Unavailable         Unavailable

 

                    Bolla, JUAN Ramos MD Unavailable         Unavailable

 

                    Bolla, JUAN Ramos MD Unavailable         Unavailable

 

                    Bolla, JUAN Ramos MD Unavailable         Unavailable

 

                    Bolla, JUAN Ramos MD Unavailable         Unavailable

 

                    Bolla, JUAN Ramos MD Unavailable         Unavailable

 

                    BolJUAN rodríguez MD Unavailable         Unavailable

 

                    Bolla, JUAN Ramos MD Unavailable         Unavailable

 

                    BolJUAN rodríguez MD Unavailable         Unavailable

 

                    BolJUAN rodríguez MD Unavailable         Unavailable

 

                    BolJUAN rodríguez MD Unavailable         Unavailable

 

                    BolJUAN rodríguez MD Unavailable         Unavailable

 

                    BolJUAN rodríguez MD Unavailable         Unavailable

 

                    BolJUAN rodríguez MD Unavailable         Unavailable

 

                    BolJUAN rodríguez MD Unavailable         Unavailable

 

                    BolJUAN rodríguez MD Unavailable         Unavailable

 

                    BolJUAN rodríguez MD Unavailable         Unavailable

 

                    BolJUAN rodríguez MD Unavailable         Unavailable

 

                    BolJUAN rodríguez MD Unavailable         Unavailable

 

                    Morven, Brea FNP Unavailable         Unavailable

 

                    Morven, Brea FNP Unavailable         Unavailable

 

                    Morven, Brea FNP Unavailable         Unavailable

 

                    Morven, Brea FNP Unavailable         Unavailable

 

                    Morven, Brea FNP Unavailable         Unavailable

 

                    Morven, Brea FNP Unavailable         Unavailable

 

                    Morven, Brea FNP Unavailable         Unavailable

 

                    Morven, Brea FNP Unavailable         Unavailable

 

                    Morven, Brea FNP Unavailable         Unavailable

 

                    Morven, Brea FNP Unavailable         Unavailable

 

                    Morven, Brea FNP Unavailable         Unavailable

 

                    Morven, Brea FNP Unavailable         Unavailable

 

                    Morven, Brea FNP Unavailable         Unavailable

 

                    Morven, Brea FNP Unavailable         Unavailable

 

                    Morven, Brea FNP Unavailable         Unavailable

 

                    Morven, Brea FNP Unavailable         Unavailable

 

                    Morven, Brea FNP Unavailable         Unavailable

 

                    Morven, Brea FNP Unavailable         Unavailable

 

                    Morven, Brea FNP Unavailable         Unavailable

 

                    Morven, Brea FNP Unavailable         Unavailable

 

                    Morven, Brea FNP Unavailable         Unavailable

 

                    Morven, Brea FNP Unavailable         Unavailable

 

                    Morven, Brea FNP Unavailable         Unavailable

 

                    Morven, Brea FNP Unavailable         Unavailable

 

                    Morven, Brea FNP Unavailable         Unavailable

 

                    Morven, Brea FNP Unavailable         Unavailable

 

                    Morven, Brea FNP Unavailable         Unavailable

 

                    Morven, Brea FNP Unavailable         Unavailable

 

                    Morven, Brea FNP Unavailable         Unavailable

 

                    Morven, Brea FNP Unavailable         Unavailable

 

                    Morven, Brea FNP Unavailable         Unavailable

 

                    Morven, Brea FNP Unavailable         Unavailable

 

                    Morven, Brea FNP Unavailable         Unavailable

 

                    Morven, Brea FNP Unavailable         Unavailable

 

                    Morven, Brea FNP Unavailable         Unavailable

 

                    Morven, Brea FNP Unavailable         Unavailable

 

                    Jumalon, M Page FNP Unavailable         Unavailable

 

                    Jumalon, M Page FNP Unavailable         Unavailable

 

                    Jumalon, M Page FNP Unavailable         Unavailable

 

                    Jumalon, M Page FNP Unavailable         Unavailable

 

                    Jumalon, M Page FNP Unavailable         Unavailable

 

                    Jumalon, M Page FNP Unavailable         Unavailable

 

                    Jumalon, M Page FNP Unavailable         Unavailable

 

                    Jumalon, M Page FNP Unavailable         Unavailable

 

                    Jumalon, M Page FNP Unavailable         Unavailable

 

                    Jumalon, M Page FNP Unavailable         Unavailable

 

                    Jumalon, M Page FNP Unavailable         Unavailable

 

                    Jumalon, M Page FNP Unavailable         Unavailable

 

                    Jumalon, M Page FNP Unavailable         Unavailable

 

                    Jumalon, M Page FNP Unavailable         Unavailable

 

                    Jumalon, M Page FNP Unavailable         Unavailable

 

                    Jumalon, M Page FNP Unavailable         Unavailable

 

                    Jumalon, M Page FNP Unavailable         Unavailable

 

                    Jumalon, M Page FNP Unavailable         Unavailable

 

                    Jumalon, M Page FNP Unavailable         Unavailable

 

                    Jumalon, M Page FNP Unavailable         Unavailable

 

                    Jumalon, M Page FNP Unavailable         Unavailable

 

                    Jumalon, M Page FNP Unavailable         Unavailable

 

                    Jumalon, M Page FNP Unavailable         Unavailable

 

                    Jumalon, M Page FNP Unavailable         Unavailable

 

                    Jumalon, M Page FNP Unavailable         Unavailable

 

                    Jumalon, M Page FNP Unavailable         Unavailable

 

                    Jumalon, M Page FNP Unavailable         Unavailable

 

                    Jumalon, M Page FNP Unavailable         Unavailable

 

                    Jumalon, M Page FNP Unavailable         Unavailable

 

                    Jumalon, M Page FNP Unavailable         Unavailable

 

                    MERON,  LYNDSAY    Unavailable         Unavailable

 

                    MERON,  LYNDSAY    Unavailable         Unavailable

 

                    MERON,  LYNDSAY    Unavailable         Unavailable

 

                    MERON,  LYNDSAY    Unavailable         Unavailable

 

                    MERON,  LYNDSAY    Unavailable         Unavailable

 

                    MERON,  LYNDSAY    Unavailable         Unavailable

 

                    Brooks Munguia, MELODY Alonso MD, FACS Unavailable         Unavailable

 

                    Brooks Munguia, MELODY Alonso MD, FACS Unavailable         Unavailable

 

                    Brooks Munguia, MELODY Alonso MD, FACS Unavailable         Unavailable

 

                    Brooks Munguia, MELODY Alonso MD, FACS Unavailable         Unavailable

 

                    Brooks Munguia, MELODY Alonso MD, FACS Unavailable         Unavailable

 

                    Brooks Munguia, EMLODY Alonso MD, FACS Unavailable         Unavailable

 

                    Brooks Munguia, MELODY Alonso MD, FACS Unavailable         Unavailable

 

                    Brooks Munguia, MELODY Alonso MD, FACS Unavailable         Unavailable

 

                    Brooks Munguia, MELODY Alonso MD, FACS Unavailable         Unavailable

 

                    Brooks Munguia, MELODY Alonso MD, FACS Unavailable         Unavailable

 

                    Brooks Munguia, MELODY Alonso MD, FACS Unavailable         Unavailable

 

                    Brooks Munguia, MELODY Alonso MD, FACS Unavailable         Unavailable

 

                    Brooks Munguia, MELODY Alonso MD, FACS Unavailable         Unavailable

 

                    Brooks Munguia, MELODY Alonso MD, FACS Unavailable         Unavailable

 

                    Brooks Munguia, MELODY Alonso MD, FACS Unavailable         Unavailable

 

                    Brooks Munguia, MELODY Alonso MD, FACS Unavailable         Unavailable

 

                    Brooks Munguia, MELODY Alonso MD, FACS Unavailable         Unavailable

 

                    Brooks Munguia, MELODY Alonso MD, FACS Unavailable         Unavailable

 

                    Brooks Munguia, MELODY Alonso MD, FACS Unavailable         Unavailable

 

                    Brooks Munguia, MELODY Alonso MD, FACS Unavailable         Unavailable

 

                    Brooks Munguia, MELODY Alonso MD, FACS Unavailable         Unavailable

 

                    Brooks Munguia, MELODY Alonso MD, FACS Unavailable         Unavailable

 

                    Brooks Munguia, MELODY Alonso MD, FACS Unavailable         Unavailable

 

                    Brooks Munguia, MELODY Alonso MD, FACS Unavailable         Unavailable

 

                    Brooks Munguia, MELODY Alonso MD, FACS Unavailable         Unavailable

 

                    Brooks Munguia, MELODY Alonso MD, FACS Unavailable         Unavailable

 

                    Brooks Munguia, MELODY Alonso MD, FACS Unavailable         Unavailable

 

                    Brooks Munguia, MELODY Alonso MD, FACS Unavailable         Unavailable

 

                    Brooks Munguia, MELODY Alonso MD, FACS Unavailable         Unavailable

 

                    Brooks Munguia, MELODY Alonso MD, FACS Unavailable         Unavailable

 

                    Brooks Munguia, MELODY Alonso MD, FACS Unavailable         Unavailable

 

                    Cecilia Munguia, MELODY Alonso MD, FACS Unavailable         Unavailable

 

                    Brooks Ezra, MELODY Alonso MD, FACS Unavailable         Unavailable

 

                    Brooks Ezra, MELODY Alonso MD, FACS Unavailable         Unavailable

 

                    Brooks Ezra, MELODY Alonso MD, FACS Unavailable         Unavailable

 

                    Brooks Ezra, MELODY Alonso MD, FACS Unavailable         Unavailable

 

                    Brooks Munguia, MELODY Alonso MD, FACS Unavailable         Unavailable

 

                    Brooks Ezra, MELODY Alonso MD, FACS Unavailable         Unavailable

 

                    Brooks Munguia, MELODY Alonso MD, FACS Unavailable         Unavailable

 

                    Altamirano, L Maryuri RPA Unavailable         Unavailable

 

                    Altamirano, L Maryuri RPA Unavailable         Unavailable

 

                    Altamirano, L Maryuri RPA Unavailable         Unavailable

 

                    Altamirano, L Maryuri RPA Unavailable         Unavailable

 

                    Altamirano, L Maryuri RPA Unavailable         Unavailable

 

                    Altamirano, L Maryuri RPA Unavailable         Unavailable

 

                    Altamirano, L Maryuri RPA Unavailable         Unavailable

 

                    Altamirano, L Maryuri RPA Unavailable         Unavailable

 

                    Altamirano, L Maryuri RPA Unavailable         Unavailable

 

                    Altamirano, L Maryuri RPA Unavailable         Unavailable

 

                    Altamirano, L Maryuri RPA Unavailable         Unavailable

 

                    Altamirano, L Maryuri RPA Unavailable         Unavailable

 

                    Altamirano, L Maryuri RPA Unavailable         Unavailable

 

                    Altamirano, L Maryuri RPA Unavailable         Unavailable

 

                    Altamirano, L Maryuri RPA Unavailable         Unavailable

 

                    Altamirano, L Maryuri RPA Unavailable         Unavailable

 

                    Altamirano, L Maryuri RPA Unavailable         Unavailable

 

                    Altamirano, L Maryuri RPA Unavailable         Unavailable

 

                    Altamirano, L Maryuri RPA Unavailable         Unavailable

 

                    Altamirano, L Maryuri RPA Unavailable         Unavailable

 

                    Altamirano, L Maryuri RPA Unavailable         Unavailable

 

                    Altamirano, L Maryuri RPA Unavailable         Unavailable

 

                    Altamirano, L Maryuri RPA Unavailable         Unavailable

 

                    Altamirano, L Maryuri RPA Unavailable         Unavailable

 

                    Altamirano, L Maryuri RPA Unavailable         Unavailable

 

                    Altamirano, L Maryuri RPA Unavailable         Unavailable

 

                    Altamirano, L Maryuri RPA Unavailable         Unavailable

 

                    Altamirano, L Maryuri RPA Unavailable         Unavailable

 

                    Altamirano, L Maryuri RPA Unavailable         Unavailable

 

                    Altamirano, L Maryuri RPA Unavailable         Unavailable

 

                    Altamirano, L Maryuri RPA Unavailable         Unavailable

 

                    Altamirano, L Maryuri RPA Unavailable         Unavailable

 

                    Tyler, Mi Young PT    Unavailable         Unavailable

 

                    Tyler, Mi Young PT    Unavailable         Unavailable

 

                    Tyler, Mi Young PT    Unavailable         Unavailable

 

                    Tyler, Mi Young PT    Unavailable         Unavailable

 

                    Tyler, Mi Young PT    Unavailable         Unavailable

 

                    Tyler, Mi Young PT    Unavailable         Unavailable

 

                    Tyler, Mi Young PT    Unavailable         Unavailable

 

                    Tyler, Mi Young PT    Unavailable         Unavailable

 

                    Tyler, Mi Young PT    Unavailable         Unavailable

 

                    Tyler, Mi Young PT    Unavailable         Unavailable

 

                    Tyler, Mi Young PT    Unavailable         Unavailable

 

                    Tyler, Mi Young PT    Unavailable         Unavailable

 

                    Tyler, Mi Young PT    Unavailable         Unavailable

 

                    Tyler, Mi Young PT    Unavailable         Unavailable

 

                    Tyler, Mi Young PT    Unavailable         Unavailable

 

                    Tyler, Mi Young PT    Unavailable         Unavailable

 

                    Tyler, Mi Young PT    Unavailable         Unavailable

 

                    Tyler, Mi Young PT    Unavailable         Unavailable

 

                    Tyler, Mi Young PT    Unavailable         Unavailable

 

                    Tyler, Mi Young PT    Unavailable         Unavailable

 

                    Tyler, Mi Young PT    Unavailable         Unavailable

 

                    Tyler, Mi Young PT    Unavailable         Unavailable

 

                    Tyler, Mi Young PT    Unavailable         Unavailable

 

                    Amador,  Lyndsay PA    Unavailable         Unavailable

 

                    Amador,  Lyndsay PA    Unavailable         Unavailable

 

                    Amador,  Lyndsay PA    Unavailable         Unavailable

 

                    Amador,  Lyndsay PA    Unavailable         Unavailable

 

                    Amador,  Lyndsay PA    Unavailable         Unavailable

 

                    Amador,  Lyndsay PA    Unavailable         Unavailable

 

                    Amador,  Lyndsay PA    Unavailable         Unavailable

 

                    Amador,  Lyndsay PA    Unavailable         Unavailable

 

                    Amador,  Lyndsay PA    Unavailable         Unavailable

 

                    Amador,  Lyndsay PA    Unavailable         Unavailable

 

                    Amador,  Lyndsay PA    Unavailable         Unavailable

 

                    Amador,  Lyndsay PA    Unavailable         Unavailable

 

                    Amador,  Lyndsay PA    Unavailable         Unavailable

 

                    Amador,  Lyndsay PA    Unavailable         Unavailable

 

                    Amador,  Lyndsay PA    Unavailable         Unavailable

 

                    Amador,  Lyndsay PA    Unavailable         Unavailable

 

                    Amador,  Lyndsay PA    Unavailable         Unavailable

 

                    Amador,  Lyndsay PA    Unavailable         Unavailable

 

                    Amador,  Lyndsay PA    Unavailable         Unavailable

 

                    Maador,  Lyndsay PA    Unavailable         Unavailable

 

                    Amador,  Lyndsay PA    Unavailable         Unavailable

 

                    Amador,  Lyndsay PA    Unavailable         Unavailable

 

                    Amador,  Lyndsay PA    Unavailable         Unavailable

 

                    Amador,  Lyndsay PA    Unavailable         Unavailable

 

                    Amador,  Lyndsay PA    Unavailable         Unavailable

 

                    Amador,  Lyndsay PA    Unavailable         Unavailable

 

                    Amador,  Lyndsay PA    Unavailable         Unavailable

 

                    Amador,  Lyndsay PA    Unavailable         Unavailable

 

                    Amador,  Lyndsay PA    Unavailable         Unavailable

 

                    Amador,  Lyndsay PA    Unavailable         Unavailable

 

                    Amador,  Lyndsay PA    Unavailable         Unavailable

 

                    Amador,  Lyndsay PA    Unavailable         Unavailable

 

                    Amador,  Lyndsay PA    Unavailable         Unavailable

 

                    Amador,  Lyndsay PA    Unavailable         Unavailable

 

                    Amador,  Lyndsay PA    Unavailable         Unavailable

 

                    Amador,  Lyndsay PA    Unavailable         Unavailable

 

                    Amador,  Lyndsay PA    Unavailable         Unavailable

 

                    Amador,  Lyndsay PA    Unavailable         Unavailable

 

                    Amador,  Lyndsay PA    Unavailable         Unavailable

 

                    Amador,  Lyndsay PA    Unavailable         Unavailable

 

                    Amador,  Lyndsay PA    Unavailable         Unavailable

 

                    Amador,  Lyndsay PA    Unavailable         Unavailable

 

                    Amador,  Lyndsay PA    Unavailable         Unavailable

 

                    Amador,  Lyndsay PA    Unavailable         Unavailable

 

                    Amador,  Lyndsay PA    Unavailable         Unavailable

 

                    Amador,  Lyndsay PA    Unavailable         Unavailable

 

                    Amador,  Lyndsay PA    Unavailable         Unavailable

 

                    Amador,  Lyndsay PA    Unavailable         Unavailable

 

                    Amador,  Lyndsay PA    Unavailable         Unavailable

 

                    Amador,  Lyndsay PA    Unavailable         Unavailable

 

                    Amador,  Lyndsay PA    Unavailable         Unavailable

 

                    Amador,  Lyndsay PA    Unavailable         Unavailable



                                  



Re-disclosure Warning

          The records that you are about to access may contain information from 
federally-assisted alcohol or drug abuse programs. If such information is 
present, then the following federally mandated warning applies: This information
has been disclosed to you from records protected by federal confidentiality 
rules (42 CFR part 2). The federal rules prohibit you from making any further 
disclosure of this information unless further disclosure is expressly permitted 
by the written consent of the person to whom it pertains or as otherwise 
permitted by 42 CFR part 2. A general authorization for the release of medical 
or other information is NOT sufficient for this purpose. The Federal rules 
restrict any use of the information to criminally investigate or prosecute any 
alcohol or drug abuse patient.The records that you are about to access may 
contain highly sensitive health information, the redisclosure of which is 
protected by Article 27-F of the [facility] Public Health law. If you 
continue you may have access to information: Regarding HIV / AIDS; Provided by 
facilities licensed or operated by the [facility] Office of Mental Health; 
or Provided by the [facility] Office for People With Developmental 
Disabilities. If such information is present, then the following [place] 
mandated warning applies: This information has been disclosed to you from 
confidential records which are protected by state law. State law prohibits you 
from making any further disclosure of this information without the specific 
written consent of the person to whom it pertains, or as otherwise permitted by 
law. Any unauthorized further disclosure in violation of state law may result in
a fine or [facility]PC sentence or both. A general authorization for the release of 
medical or other information is NOT sufficient authorization for further disc
losure.                                                                         
    



Allergies and Adverse Reactions

          



           Type       Description Substance  Reaction   Status     Data Source(s

)

 

           Allergy to substance No Known Allergies No known allergies (situation

)                       

FERN (Gavin Munguia MD [facility])



                                                                                
       



Family History

          



             Family Member Name Family Member Gender Family Member Status Date o

f Status 

Description                             Data Source(s)

 

           Unknown    Male       Problem                          [facility] ([facility]

 

                                        () 

 

           Unknown    Female     Problem                          [facility] ([facility])

 

           Unknown    Female     Problem                          [facility] ([facility]



                                                                                
                 



Encounters

          



           Encounter  Providers  Location   Date       Indications Data Source(s

)

 

                Outpatient      Attender: Sadaf Bailey [facility] Main Office     

2021 11:45:00 AM 

EDT                                                 [facility]

 

           Outpatient Attender: LYNDSAY CHANCE Main Office 10/27/2021 03:00:00 P

M EDT            

[facility] ([facility])

 

           Outpatient Attender: LYNDSAY CHANCE Main Office 10/20/2021 11:30:00 A

M EDT            

[facility] ([facility])

 

                Outpatient      Attender: Sadaf Bailey [facility] Main Office     

2021 12:15:00 PM 

EDT                                                 [facility]

 

                Office Visit    Attender: Maryuri Arellano/Raymond/Pascual/R

eindl 2021 

01:30:00 PM EDT                                     [facility] ([facility]

actice, )

 

                Outpatient      Attender: Sadaf Bailey FN Main Office     

2021 03:45:00 PM 

EDT                                                 [facility]

 

                                        Outpatient<td ID="encounterTypeDescripti

onID0">2 Year Follow-Up</td><td>Gavin Walker MD, VENKATA</td><td>Gavin Walker MD 
[facility]</td><td>2021</td><td>11:49AM</td><td>12:36PM</td><td><content 
ID="encounterDiagnosisID0-0">Cataract Senile Posterior Subcapsular 
Polar</content>, <content ID="encounterDiagnosisID0-1">Cataract Senile 
Cortical</content>, <content ID="encounterDiagnosisID0-2">Cataract Senile 
Nuclear</content>, <content ID="encounterDiagnosisID0-3">Taking Medication For 
Diabetes Long-term Use of Oral Hypoglycemics</content>, <content 
ID="encounterDiagnosisID0-4">Glaucoma Open-angle Primary Both Eyes</content>, 
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 

VENKATA Walker MD [facility]  2021 11:49:00 AM EDT -

 2021 12:36:00 

PM EDT                                  Taking Medication For Diabetes Long-term

 Use of Oral 

HypoglycemicsCataract Senile Posterior Subcapsular PolarType 2 Diab W/ Diab 
Retinopathy Mild Nonprolif Without Macular EdemaCataract Senile CorticalGlaucoma
Open-angle Primary Both EyesCataract Senile Nuclear FERN (Gavin Munguia MD [facility])

 

                                        Taking Medication For Diabetes Long-term

 Use of Oral Hypoglycemics 

 

                                        Cataract Senile Posterior Subcapsular Po

lar 

 

                                        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

prolif Without Macular Edema 

 

                                        Cataract Senile Cortical 

 

                                        Glaucoma Open-angle Primary Both Eyes 

 

                                        Cataract Senile Nuclear 

 

           Outpatient Attender: Lyndsay VALENCIA Main Office 2021 11:00:00 A

M EDT            

[facility] ([facility])

 

                Outpatient      Attender: Petr Varela MD Physical Therapy  10:15:00 AM 

EDT                                                 [facility] ([facility]

 

             Outpatient   Attender: Colin Scott PT              2021 05:05:0

0 PM EDT FULL INCONTINENCE

OF FECES DIARRHEA FECAL SMEARING        [facility]

 

                                        FULL INCONTINENCE OF FECES DIARRHEA FECA

L SMEARING 

 

           Outpatient Attender: Colin Scott PT            2021 05:05:00 PM E

DT            [facility], NP: [id number] [address], [address] [place], [place] 

[address], Ph. [phone] Attender: Page [facility] 

[facility] 10/21/2020 12:00:00 AM EDT                     ATHE

[facility] ([facility]

[facility])

 

                                        Richard Wheatley MD: [id number] [facility] [address], [place] [address], Ph. [phone]  Attender: Richard Wheatley MD [place] [facility] 10/05/2020 12:00:00 AM EDT                     SAIDA 

([facility])

 

                                        Richard Wheatley MD: [id number] [facility] [address], [facility] [place] [address], Ph. [phone]  Attender: Richard Wheatley MD [place] [facility] 10/05/2020 12:00:00 AM EDT                     SAIDA 

([facility])



                                                                                
                                                                                
                                                        



Immunizations

          



             Vaccine      Date         Status       Description  Data Source(s)

 

             COVID-19 VACCINE Pfizer 10/20/2021 12:00:00 AM EDT completed       

          [facility]

 

                                        Vaccine Series Complete: YESThis Data wa

s Submitted to [facility] Via [facility]. 

 

             COVID-19 VACC, MRNA(PFIZER)/PF 10/20/2021 12:00:00 AM EDT completed

                 [facility]

 

             COVID-19 VACCINE Pfizer 2021 12:00:00 AM EST completed       

          [facility]

 

                                        Vaccine Series Complete: YESThis Data wa

s Submitted to [facility] Via [employer]. 

 

             COVID-19 VACCINE Pfizer 2021 12:00:00 AM EST completed       

          [facility]

 

                                        Vaccine Series Complete: NOThis Data was

 Submitted to [facility] Via [employer]. 

 

                          INFLUENZA VIRUS VACCINE QUADRIVAL SPLIT -21(65 YR 

UP)/PF 10/13/2020 12:00:00

AM EDT              completed                               [facility]



                                                                                
                                               



Medications

          



          Medication Brand Name Start Date Product Form Dose      Route     Admi

nistrative 

Instructions Pharmacy Instructions Status     Indications Reaction   Description

 Data 

Source(s)

 

          NEBULIZER AND COMPRESSOR           2021 12:00:00 AM EST device  

  1                   USE AS DIRECTED

           USE AS DIRECTED SOLD: 2021                                  [facility]

 

          500 mg              11/15/2021 12:00:00 AM EST tablet    14           

       TAKE ONE TABLET BY MOUTH EVERY 

12 HOURS FOR 7 DAYS       TAKE ONE TABLET BY MOUTH EVERY 12 HOURS FOR 7 DAYS SOL

D: 

11/15/2021                                                      [facility]

 

             2.5 mg /3 mL (0.083 %)              11/15/2021 12:00:00 AM EST solu

tion for nebulization 75

                                        INHALE THE CONTENTS OF ONE VIAL VIA NEBU

LIZER FOUR TIMES A DAY AS NEEDED 

INHALE THE CONTENTS OF ONE VIAL VIA NEBULIZER FOUR TIMES A DAY AS NEEDED SOLD: 

11/15/2021                                                      [facility]

 

                                        12 HR CHLORPHENIRAMINE POLISTIREX 1.6 MG

/ML / HYDROCODONE POLISTIREX 2 MG/ML 

Extended Release Suspension 10-8 mg/5 mL HYDROCODONE/CHLORPHEN P-STIREX 

11/15/2021 12:00:00 AM EST suspension,extended rel 12 hr 100                    

         GIVE 5ML BY MOUTH

EVERY 12 HOURS AS NEEDED MAXIMUM DAILY DOSE = 10ML GIVE 5ML BY MOUTH EVERY 12 

HOURS AS NEEDED MAXIMUM DAILY DOSE = 10ML SOLD: 11/15/2021                      

                  [facility]

 

                36,000-114,000- 180,000 unit                 2021 12:00:00

 AM EDT capsule,delayed 

release(DR/EC)  90                              TAKE ONE CAPSULE BY MOUTH THREE 

TIMES A DAY WITH EACH MEAL 

TAKE ONE CAPSULE BY MOUTH THREE TIMES A DAY WITH EACH MEAL SOLD: 2021     

                            

                                        [facility]

 

                          Amlodipine 5 MG / Benazepril hydrochloride 10 MG Oral 

Capsule 5-10 mg AMLODIPINE

BESYLATE/BENAZEPRIL 10/19/2021 12:00:00 AM EDT capsule      90                  

      TAKE ONE CAPSULE BY 

MOUTH EVERY DAY TAKE ONE CAPSULE BY MOUTH EVERY DAY SOLD: 10/20/2021            

                      

[facility]

 

        240 mcg/0.7 mL         10/07/2021 12:00:00 AM EDT syringe 0             

  INJECT AS DIRECTED 

INJECT AS DIRECTED SOLD: 10/07/2021                                        [facility]

 

          2.5 mg              10/04/2021 12:00:00 AM EDT tablet extended release

 24hr 90                  TAKE ONE 

TABLET BY MOUTH EVERY DAY TAKE ONE TABLET BY MOUTH EVERY DAY SOLD: 10/05/2021   

              

                                                    [facility]

 

          5 %                 2021 12:00:00 AM EDT cream     40           

       APPLY TOPICALLY TO LATERAL FOREHEAD,

TEMPLES AND LEFT EAR SPARINGLY TWO TIMES A DAY APPLY TOPICALLY TO LATERAL 

FOREHEAD, TEMPLES AND LEFT EAR SPARINGLY TWO TIMES A DAY SOLD: 10/05/2021       

                                 

[facility]

 

           Fluorouracil 50 MG/ML Topical Cream Fluorouracil 2021 12:00:00 

AM EDT                       

                              active                                  [facility] ([facility]

 

          0.4 mg              2021 12:00:00 AM EDT capsule   90           

       TAKE ONE CAPSULE BY MOUTH EVERY

DAY        TAKE ONE CAPSULE BY MOUTH EVERY DAY SOLD: 2021                 

                 [facility]

 

          25 mg               2021 12:00:00 AM EDT tablet    180          

       TAKE TWO TABLETS BY MOUTH EVERY 

DAY        TAKE TWO TABLETS BY MOUTH EVERY DAY SOLD: 2021                 

                 [facility]

 

          400 mg              2021 12:00:00 AM EDT capsule   90           

       TAKE ONE CAPSULE BY MOUTH TWICE

A DAY      TAKE ONE CAPSULE BY MOUTH TWICE A DAY SOLD: 10/20/2021               

                   [facility]

 

          400 mg              2021 12:00:00 AM EDT capsule   90           

       TAKE ONE CAPSULE BY MOUTH TWICE

A DAY      TAKE ONE CAPSULE BY MOUTH TWICE A DAY SOLD: 2021               

                   [facility]

 

          25 mg               2021 12:00:00 AM EDT tablet    90           

       TAKE ONE TABLET BY MOUTH EVERY 

DAY        TAKE ONE TABLET BY MOUTH EVERY DAY SOLD: 09/10/2021                  

                [facility]

 

          1,250 mcg (50,000 unit)           2021 12:00:00 AM EDT capsule  

 12                  TAKE ONE 

CAPSULE BY MOUTH WEEKLY TAKE ONE CAPSULE BY MOUTH WEEKLY SOLD: 09/10/2021       

                           

[facility]

 

          90 mcg/actuation           2021 12:00:00 AM EDT HFA aerosol inha

ler 8                   INHALE TWO

PUFFS BY MOUTH EVERY 6 HOURS AS NEEDED  INHALE TWO PUFFS BY MOUTH EVERY 6 HOURS 

AS NEEDED    SOLD: 2021                                        [facility]

[facility]

 

                          Amlodipine 5 MG / Benazepril hydrochloride 10 MG Oral 

Capsule 5-10 mg AMLODIPINE

BESYLATE/BENAZEPRIL 2021 12:00:00 AM EDT capsule      90                  

      TAKE ONE CAPSULE BY 

MOUTH EVERY DAY TAKE ONE CAPSULE BY MOUTH EVERY DAY SOLD: 2021            

                      

[facility]

 

                                        Brimonidine tartrate 2 MG/ML / Timolol 5

 MG/ML Ophthalmic Solution [Combigan] 

0.2-0.5 %  BRIMONIDINE TARTRATE/TIMOLOL 2021 12:00:00 AM EDT drops      15

                    

INSTILL 1 DROP IN EACH EYE TWO TIMES A DAY INSTILL 1 DROP IN EACH EYE TWO TIMES 

A DAY        SOLD: 2021                                        [facility]

[facility]

 

                                        Brimonidine tartrate 2 MG/ML / Timolol 5

 MG/ML Ophthalmic Solution [Combigan] 

0.2-0.5 %  BRIMONIDINE TARTRATE/TIMOLOL 2021 12:00:00 AM EDT drops      15

                    

INSTILL 1 DROP IN EACH EYE TWO TIMES A DAY INSTILL 1 DROP IN EACH EYE TWO TIMES 

A DAY        SOLD: 2021                                        [facility]

[facility]

 

                                        Brimonidine tartrate 2 MG/ML / Timolol 5

 MG/ML Ophthalmic Solution [Combigan] 

0.2-0.5 %  BRIMONIDINE TARTRATE/TIMOLOL 2021 12:00:00 AM EDT drops      15

                    

INSTILL 1 DROP IN EACH EYE TWO TIMES A DAY INSTILL 1 DROP IN EACH EYE TWO TIMES 

A DAY        SOLD: 2021                                        [facility]

 

                                        Brimonidine tartrate 2 MG/ML / Timolol 5

 MG/ML Ophthalmic Solution [Combigan] 

Combigan 0.2-0.5% Ophthalmic Solution Combigan 0.2-0.5% Ophthalmic Solution 

2021 12:00:00 AM EDT                                         active       

           brimonidine tartrate 2 MG/ML / 

timolol 5 MG/ML Ophthalmic Solution [Combigan] FERN (Gavin Munguia MD 

[facility])

 

                                        travoprost 0.04 MG/ML Ophthalmic Solutio

n [Travatan] Travatan Z 0.004% 

Ophthalmic Solution       Travatan Z 0.004% Ophthalmic Solution 2021 12:00

:00 AM

 EDT          1                           aborted               travoprost 0.04 

MG/ML Ophthalmic Solution [Travatan] 

FERN (Gavin Munguia MD [facility])

 

                                        Brimonidine tartrate 2 MG/ML / Timolol 5

 MG/ML Ophthalmic Solution [Combigan] 

Combigan 0.2-0.5% Ophthalmic Solution Combigan 0.2-0.5% Ophthalmic Solution 

2021 12:00:00 AM EDT                                         aborted      

           brimonidine tartrate 2 MG/ML / 

timolol 5 MG/ML Ophthalmic Solution [Combigan] FERN (Gavin Munguia MD 

[facility])

 

          2.5 mg              2021 12:00:00 AM EDT tablet extended release

 24hr 90                  TAKE ONE 

TABLET BY MOUTH EVERY DAY TAKE ONE TABLET BY MOUTH EVERY DAY SOLD: 2021   

              

                                                    [facility]

 

          0.4 mg              2021 12:00:00 AM EDT capsule   90           

       TAKE ONE CAPSULE BY MOUTH EVERY

 DAY       TAKE ONE CAPSULE BY MOUTH EVERY DAY SOLD: 2021                 

                 [facility]

 

                                        Brimonidine tartrate 2 MG/ML / Timolol 5

 MG/ML Ophthalmic Solution [Combigan] 

Combigan 0.2-0.5% Ophthalmic Solution Combigan 0.2-0.5% Ophthalmic Solution 

06/15/2021 12:00:00 AM EDT                                         aborted      

           brimonidine tartrate 2 MG/ML / 

timolol 5 MG/ML Ophthalmic Solution [Combigan] FERN (Gavin Munguia MD 

[facility])

 

                                        Brimonidine tartrate 2 MG/ML / Timolol 5

 MG/ML Ophthalmic Solution [Combigan] 

0.2-0.5 %  BRIMONIDINE TARTRATE/TIMOLOL 06/15/2021 12:00:00 AM EDT drops      5 

                    

INSTILL 1 DROP IN EACH EYE TWO TIMES A DAY INSTILL 1 DROP IN EACH EYE TWO TIMES 

A DAY        SOLD: 2021                                        [facility]

 

          400 mg              2021 12:00:00 AM EDT capsule   90           

       TAKE ONE CAPSULE BY MOUTH TWICE

 A DAY     TAKE ONE CAPSULE BY MOUTH TWICE A DAY SOLD: 2021               

                   [facility]

 

          400 mg              2021 12:00:00 AM EDT capsule   90           

       TAKE ONE CAPSULE BY MOUTH TWICE

 A DAY     TAKE ONE CAPSULE BY MOUTH TWICE A DAY SOLD: 2021               

                   [facility]

 

          1,250 mcg (50,000 unit)           2021 12:00:00 AM EDT capsule  

 12                  TAKE 1 CAPSULE

 BY MOUTH ONCE A WEEK TAKE 1 CAPSULE BY MOUTH ONCE A WEEK SOLD: 2021      

                      

                                        [facility]

 

          25 mg               2021 12:00:00 AM EDT tablet    180          

       TAKE TWO TABLETS BY MOUTH EVERY 

DAY        TAKE TWO TABLETS BY MOUTH EVERY DAY SOLD: 2021                 

                 [facility]

 

                                        Brimonidine tartrate 2 MG/ML / Timolol 5

 MG/ML Ophthalmic Solution [Combigan] 

Combigan 0.2-0.5% Ophthalmic Solution Combigan 0.2-0.5% Ophthalmic Solution 

05/10/2021 12:00:00 AM EDT                                         aborted      

           brimonidine tartrate 2 MG/ML / 

timolol 5 MG/ML Ophthalmic Solution [Combigan] FERN (Gavin Munguia MD 

[facility])

 

                                        Brimonidine tartrate 2 MG/ML / Timolol 5

 MG/ML Ophthalmic Solution [Combigan] 

0.2-0.5 %  BRIMONIDINE TARTRATE/TIMOLOL 05/10/2021 12:00:00 AM EDT drops      5 

                    

INSTILL 1 DROP TWO TIMES A DAY IN BOTH EYES INSTILL 1 DROP TWO TIMES A DAY IN 

BOTH EYES    SOLD: 2021                                        [facility]

[facility]

 

                36,000-114,000- 180,000 unit                 2021 12:00:00

 AM EDT capsule,delayed 

release(DR/EC)  90                              TAKE ONE CAPSULE BY MOUTH THREE 

TIMES A DAY WITH EACH MEAL 

TAKE ONE CAPSULE BY MOUTH THREE TIMES A DAY WITH EACH MEAL SOLD: 2021     

                            

                                        [facility]

 

                36,000-114,000- 180,000 unit                 2021 12:00:00

 AM EDT capsule,delayed 

release(DR/EC)  90                              TAKE ONE CAPSULE BY MOUTH THREE 

TIMES A DAY WITH EACH MEAL 

TAKE ONE CAPSULE BY MOUTH THREE TIMES A DAY WITH EACH MEAL SOLD: 09/10/2021     

                            

                                        [facility]

 

                36,000-114,000- 180,000 unit                 2021 12:00:00

 AM EDT capsule,delayed 

release(DR/EC)  90                              TAKE ONE CAPSULE BY MOUTH THREE 

TIMES A DAY WITH EACH MEAL 

TAKE ONE CAPSULE BY MOUTH THREE TIMES A DAY WITH EACH MEAL SOLD: 2021     

                            

                                        [facility]

 

                36,000-114,000- 180,000 unit                 2021 12:00:00

 AM EDT capsule,delayed 

release(DR/EC)  90                              TAKE ONE CAPSULE BY MOUTH THREE 

TIMES A DAY WITH EACH MEAL 

TAKE ONE CAPSULE BY MOUTH THREE TIMES A DAY WITH EACH MEAL SOLD: 2021     

                            

                                        [facility]

 

                          Amlodipine 5 MG / Benazepril hydrochloride 10 MG Oral 

Capsule 5-10 mg AMLODIPINE

 BESYLATE/BENAZEPRIL 2021 12:00:00 AM EDT capsule      90                 

       TAKE ONE CAPSULE BY

 MOUTH EVERY DAY TAKE ONE CAPSULE BY MOUTH EVERY DAY SOLD: 04/15/2021           

                       

[facility]

 

          2.5 mg              2021 12:00:00 AM EDT tablet extended release

 24hr 90                  TAKE ONE 

TABLET BY MOUTH EVERY DAY TAKE ONE TABLET BY MOUTH EVERY DAY SOLD: 2021   

              

                                                    [facility]

 

          0.4 mg              03/15/2021 12:00:00 AM EDT capsule   90           

       TAKE ONE CAPSULE BY MOUTH EVERY

 DAY       TAKE ONE CAPSULE BY MOUTH EVERY DAY SOLD: 2021                 

                 [facility]

 

          Atenolol 25 MG Oral Tablet ATENOLOL  03/15/2021 12:00:00 AM EDT tablet

    180                 TAKE

 TWO TABLETS BY MOUTH EVERY DAY TAKE TWO TABLETS BY MOUTH EVERY DAY SOLD: 

2021                                                      [facility]

 

          1,250 mcg (50,000 unit)           2021 12:00:00 AM EST capsule  

 12                  TAKE ONE 

CAPSULE BY MOUTH ONCE WEEKLY TAKE ONE CAPSULE BY MOUTH ONCE WEEKLY SOLD: 

2021                                                      [facility]

 

          400 mg              2021 12:00:00 AM EST capsule   90           

       TAKE ONE CAPSULE BY MOUTH TWICE

 A DAY     TAKE ONE CAPSULE BY MOUTH TWICE A DAY SOLD: 2021               

                   [facility]

 

          400 mg              2021 12:00:00 AM EST capsule   90           

       TAKE ONE CAPSULE BY MOUTH TWICE

 A DAY     TAKE ONE CAPSULE BY MOUTH TWICE A DAY SOLD: 2021               

                   [facility]

 

                atorvastatin 20 MG Oral Tablet ATORVASTATIN CALCIUM 2021 1

2:00:00 AM EST 

tablet          45                              TAKE ONE TABLET BY MOUTH EVERY O

THER DAY TAKE ONE TABLET BY MOUTH 

EVERY OTHER DAY SOLD: 2021                                        [facility]

 

                atorvastatin 20 MG Oral Tablet ATORVASTATIN CALCIUM 2021 1

2:00:00 AM EST 

tablet          45                              TAKE ONE TABLET BY MOUTH EVERY O

THER DAY TAKE ONE TABLET BY MOUTH 

EVERY OTHER DAY SOLD: 2021                                        [facility]

 

                atorvastatin 20 MG Oral Tablet ATORVASTATIN CALCIUM 2021 1

2:00:00 AM EST 

tablet          45                              TAKE ONE TABLET BY MOUTH EVERY O

THER DAY TAKE ONE TABLET BY MOUTH 

EVERY OTHER DAY SOLD: 2021                                        [facility]

 

                atorvastatin 20 MG Oral Tablet ATORVASTATIN CALCIUM 2021 1

2:00:00 AM EST 

tablet          45                              TAKE ONE TABLET BY MOUTH EVERY O

THER DAY TAKE ONE TABLET BY MOUTH 

EVERY OTHER DAY SOLD: 2021                                        [facility]

 

          BLOOD SUGAR DIAGNOSTIC           2021 12:00:00 AM EST strip     

200                 USE AS DIRECTED 

TO TEST TWO TIMES A DAY   USE AS DIRECTED TO TEST TWO TIMES A DAY SOLD: 20

21

                                                                [facility]

 

          2.5 mg              2021 12:00:00 AM EST tablet extended release

 24hr 90                  TAKE ONE 

TABLET BY MOUTH EVERY DAY TAKE ONE TABLET BY MOUTH EVERY DAY SOLD: 2021   

              

                                                    [facility]

 

          5-10 mg             2021 12:00:00 AM EST capsule   90           

       TAKE ONE CAPSULE BY MOUTH 

EVERY DAY  TAKE ONE CAPSULE BY MOUTH EVERY DAY SOLD: 2021                 

                 [facility]

 

          1,250 mcg (50,000 unit)           2020 12:00:00 AM EST capsule  

 12                  TAKE ONE 

CAPSULE BY MOUTH ONCE WEEKLY TAKE ONE CAPSULE BY MOUTH ONCE WEEKLY SOLD: 

2020                                                      [facility]

 

          400 mg              2020 12:00:00 AM EST capsule   90           

       TAKE ONE CAPSULE BY MOUTH TWICE

 A DAY     TAKE ONE CAPSULE BY MOUTH TWICE A DAY SOLD: 2021               

                   [facility]

 

          400 mg              2020 12:00:00 AM EST capsule   90           

       TAKE ONE CAPSULE BY MOUTH TWICE

 A DAY     TAKE ONE CAPSULE BY MOUTH TWICE A DAY SOLD: 2020               

                   [facility]

 

          0.4 mg              2020 12:00:00 AM EDT capsule   90           

       TAKE ONE CAPSULE BY MOUTH EVERY

 DAY       TAKE ONE CAPSULE BY MOUTH EVERY DAY SOLD: 2020                 

                 [facility]

 

          Atenolol 25 MG Oral Tablet ATENOLOL  2020 12:00:00 AM EDT tablet

    180                 TAKE

 TWO TABLETS BY MOUTH EVERY DAY TAKE TWO TABLETS BY MOUTH EVERY DAY SOLD: 

2020                                                      [facility]

 

          25 mg               2020 12:00:00 AM EDT tablet    90           

       TAKE ONE TABLET BY MOUTH EVERY 

DAY        TAKE ONE TABLET BY MOUTH EVERY DAY SOLD: 2021                  

                [facility]

 

          5-10 mg             2020 12:00:00 AM EDT capsule   90           

       TAKE ONE CAPSULE BY MOUTH 

EVERY DAY  TAKE ONE CAPSULE BY MOUTH EVERY DAY SOLD: 10/13/2020                 

                 [facility]

 

          400 mg              2020 12:00:00 AM EDT capsule   90           

       TAKE ONE CAPSULE BY MOUTH TWICE

 A DAY     TAKE ONE CAPSULE BY MOUTH TWICE A DAY SOLD: 10/13/2020               

                   [facility]

 

          0.2-0.5 %           2020 12:00:00 AM EDT drops     15           

       INSTILL 1 DROP INTO BOTH EYES 

TWICE A DAY INSTILL 1 DROP INTO BOTH EYES TWICE A DAY SOLD: 2021          

                        

[facility]

 

                                        Brimonidine tartrate 2 MG/ML / Timolol 5

 MG/ML Ophthalmic Solution [Combigan] 

Combigan 0.2-0.5% Ophthalmic Solution Combigan 0.2-0.5% Ophthalmic Solution 

2020 12:00:00 AM EDT                                         aborted      

           brimonidine tartrate 2 MG/ML / 

timolol 5 MG/ML Ophthalmic Solution [Combigan] FERN (Gavin Munguia MD 

[facility])

 

          0.2-0.5 %           2020 12:00:00 AM EDT drops     15           

       INSTILL 1 DROP INTO BOTH EYES 

TWICE A DAY INSTILL 1 DROP INTO BOTH EYES TWICE A DAY SOLD: 2020          

                        

[facility]

 

                atorvastatin 20 MG Oral Tablet ATORVASTATIN CALCIUM 2020 1

2:00:00 AM EST 

tablet          45                              TAKE ONE TABLET BY MOUTH EVERY O

THER DAY TAKE ONE TABLET BY MOUTH 

EVERY OTHER DAY SOLD: 12/10/2020                                        [facility]

 

                                        travoprost 0.04 MG/ML Ophthalmic Solutio

n [Travatan] Travatan Z 0.004% 

Ophthalmic Solution       Travatan Z 0.004% Ophthalmic Solution 2019 12:00

:00 AM

 EST          1                           aborted               travoprost 0.04 

MG/ML Ophthalmic Solution [Travatan] 

FERN (Gavin Munguia MD [facility])



                                                                                
                                                                                
                                                                                
                                                                                
                                                                                
                                                                                
                                                                                
                                                                                
                                                                    



Insurance Providers

          



             Payer name   Policy type / Coverage type Policy ID    Covered party

 ID Covered 

party's relationship to mckeon Policy Mckeon             Plan Information

 

          [employer] R[employer]K [employer]NAGEMENT           [id number]           WI2           

      [id number]

 

          [employer]               [id number]           WI2                 [id number]

 

          MEDICARE  A         [id number]           Self                [id number]

[id number]

 

          MEDICARE            [id number]           SP                  [id number]

[id number]

 

                [place]are [place] [id number]     

[id number] Self                                    

[id number]

 

          MEDICARE            [id number]           SP                  [id number]

A

 

                Med[place]are [place] Medicare Primary [id number]     

[id number] Self                                    

[id number]

 

                Medicare [place] Medicare Primary [id number]     

[id number] Self                                    

[id number]

 

                [place]are [place] Medicare Primary [id number]     

[id number] Self                                    

[id number]

 

          MEDICARE            [id number]           SP                  [id number]

[id number]

 

                Medicare [place] Medicare Primary [id number]     

[id number] Self                                    

[id number]

 

          Medicare [place] Medicare Primary           [id number]    Self            

     

 

          [employer] (pr) Medigap Part B           [id number]    Family Dependent        

    

 

          [facility]       U         [id number]           Spouse              [id number]

 

             [employer] (pr)   Medigap Part B [id number]    [id number]

.[id number] 

Family Dependent                                    [id number]

 

             [employer] (pr)   Medigap Part B [id number]    [id number]

.[id number] 

Family Dependent                                    [id number]

 

             [employer] (pr)   Medigap Part B [id number]    [id number]

.[id number] 

Family Dependent                                    [id number]

 

             [employer] (pr)   Medigap Part B [id number]    [id number]

.[id number] 

Family Dependent                                    [id number]

 

             [employer] (pr)   Medigap Part B [id number]    [id number]

.[id number] 

Family Dependent                                    [id number]

 

                Medicare [employer] Medicare Primary [id number]      

[id number] Self                                    

[id number]

 

             [employer] (pr)   Medigap Part B [id number]    [id number]

.[id number] 

Family Dependent                                    [id number]

 

                Medicare [place] Medicare Primary [id number]      

[id number] Self                                    

[id number]

 

             [employer] (pr)   Medigap Part B [id number]    [id number]

.[id number] 

Family Dependent                                    [id number]

 

          [facility]       U         [id number]           Spouse              [id number]

 

          SELF PAY ONLY           [id number]           SP                  [id number]

[id number]

 

                Um[employer] (pr)        Medigap Part B  [id number]

[id number] 

[id number] Family Dependent                        

[id number]

 

          MEDICARE  MCA       [id number] S                   [id number]

[id number]

 

          [facility]           [id number]           SP               

   [id number]

 

          UM[facility]       HEA       [id number] SP                  [id number]

 

                Umr (pr)        Medigap Part B  [id number]

[id number] 

[id number] Family Dependent                        

[id number]

 

          MEDICARE  MCA       [id number] S                   [id number]

[id number]

 

          [employer] PPO O         [id number] P                   [id number]

 

          MEDICARE  C         [id number] S                   [id number]

A

 

          Medicare Part B of [facility] Other     0         [id number] 

Self                0

 

          [employer] [place] Other     0         [id number] Family Dep

endent Torri Dianelys 0

 

             Umr          Commercial   [id number]  [id number]

[id number] Family 

Dependent                                           [id number]

 

                Medicare [place] Medicare Primary [id number]     

[id number] Self                                    

[id number]

 

             [employer]        Medigap Part B [id number]    [id number]

.[id number] Family 

Dependent                                           [id number]

 

                Medicare [place] Medicare Primary [id number]      

[id number] Self                                    

[id number]

 

          Medicare Part B of [facility] Other     0         [id number] S

elf                0

 

          [employer]               [id number]           WI2                 [id number]

 

          [facility]           [id number]           WI2              

   [id number]

 

          MEDICARE PART A             -O/P           [id number]           18   

               [id number]

 

          UM[facility]                         -O/P           [id number]           [id number]     

             [id number]

 

          [employer] [place] Other     0         [id number] Family Dep

endent Torri Dianelys 0

 

          Medicare Part B of [facility] Other     0         [id number] 

Self                0

 

                Umr (pr)        Medigap Part B  [id number]

[id number] 

[id number] Family Dependent                        

[id number]

 

          UM[facility]       O         [id number] P                   [id number]

 

                Umr (pr)        [facility] Part B  [id number]

[id number] 

[id number] Family Dependent                        

[id number]

 

          MEDICARE  C         [id number] S                   [id number]

[id number]

 

             Umr          Commercial   [id number]  [id number]

[id number] Family 

Dependent                                           [id number]

 

                Medicare [place]/[facility] Medicare Primary [id number]     

[id number] Self                                    

[id number]

 

                Umr (pr)        [facility] Part B  [id number]

[id number] 

[id number] Family Dependent                        

[id number]

 

          [facility]       O         [id number] S                   [id number]



                                                                                
                                                                                
                                                                                
                                                                                
                                                                                
                                                                                
                                                                                
                                                          



Problems, Conditions, and Diagnoses

          No Information                                                        
                                



Surgeries/Procedures

          



             Procedure    Description  Date         Indications  Data Source(s)

 

             OFFICE OUTPATIENT VISIT 15 MINUTES              2021 12:00:00

 AM EDT              [facility] 

([facility]

 

             OFFICE OUTPATIENT VISIT 15 MINUTES              10/27/2021 12:00:00

 AM EDT              [facility] 

([facility])

 

             OFFICE OUTPATIENT VISIT 15 MINUTES              10/20/2021 12:00:00

 AM EDT              [facility] 

([facility])

 

             OFFICE OUTPATIENT VISIT 25 MINUTES              2021 12:00:00

 AM EDT              [facility] 

([facility])

 

                    Excise Malig Lesion  .6-1CM Face/Ear/Eyelid/Nose/Lip        

             2021 12:00:00 AM 

EDT                                                 [facility] ([facility]

tiffanie, )

 

             Shave Biopsy Of Skin, Single Lesion              2021 12:00:0

0 AM EDT              [facility] 

([facility])

 

             OFFICE OUTPATIENT VISIT 25 MINUTES              2021 12:00:00

 AM EDT              [facility] 

([facility])

 

                    History finding (finding) No significant past surgical histo

ry 2021 

12:00:00 AM EDT                                     FERN (Gavin Munguia MD [facility])

 

                    Duplex Scan Aorta/Inf Vena Cava/Iliac Vasc/Bypass GRFT LTD/F

ol-Up                     2021 

12:00:00 AM EDT                                     [facility] ([facility])

 

             OFFICE OUTPATIENT VISIT 15 MINUTES              2021 12:00:00

 AM EDT              [facility] 

([facility])

 

             THERAPEUTIC PX 1/> AREAS EACH 15 MIN EXERCISES              

021 12:00:00 AM EDT              

[facility] ([facility] )

 

             Physical Therapy Eval - Low Complexity              2021 12:0

0:00 AM EDT              [facility] 

([facility])

 

             ARTHROCENTESIS ASPIR&/INJECTION MAJOR JT/BURSA              

021 12:00:00 AM EDT              

[facility] ([facility])

 

             RADEX SHOULDER COMPLETE MINIMUM 2 VIEWS              2021 12:

00:00 AM EDT              [facility] 

([facility])

 

             DESTRUCTION PREMALIGNANT LESION 1ST              2021 12:00:0

0 AM EDT              [facility] 

([facility])

 

             DESTRUCTION PREMALIGNANT LESION 2-14 EA              2021 12:

00:00 AM EDT              [facility] 

([facility])



                                                                                
                                                                                
                                                                              



Results

          



                    ID                  Date                Data Source

 

                    [id number]                 11/15/2021 12:00:00 AM EST [facility]









          Name      Value     Range     Interpretation Code Description Data Sejal

rce(s) Supporting 

Document(s)

 

          [facility] Antigen Negative                                [facility]

     

 

                                        [facility]

[facility] and reported by [facility]. 









                    ID                  Date                Data Source

 

                    [id number]         2021 01:35:00 PM EDT [facility] ([facility], )









          Name      Value     Range     Interpretation Code Description Data Sejal

rce(s) Supporting 

Document(s)

 

           Surgical pathology study Laboratory test result                      

            [facility] ([facility], )                            

 

                                        FINAL DIAGNOSIS



Left cheek, lesion, excision:

Skin with a few prominent hair follicles and actinic damage.

No malignancy is identified.

                                        2021 - 1105



CLINICAL DIAGNOSIS



SCC left cheek

                                        2021



GROSS DIAGNOSIS



Received in formalin labeled "left cheek" is a 0.9 x 0.4 x 0.4 cm.

ellipsoid portion of skin.  It is inked, bisected and submitted all in

one.

                                        -

                                        2021 - 



Signed________ Bay Sierra MD 2021 1220

 









                    ID                  Date                Data Source

 

                    [id number]              2021 04:07:00 PM EDT [facility] ([facility]









          Name      Value     Range     Interpretation Code Description Data Sejal

rce(s) Supporting 

Document(s)

 

           Laboratory test finding (navigational concept) Laboratory test result

                                  

[facility] ([facility])    

 

                                        Refer Dr Jameson letter awaiting signatu

re LW 21 photo emailed, letter sent

 awaiting appt lw 21 wife called inquiring on appt with Dr Jameson, I spoke
 with Devika and Dr Jameson is out for 2 weeks and Mervin is out this week.  She
 will call his wife and get him scheduled  LW 21 Patient's wife came into 
the office today and wanted referral resent to Dr Zayas and he is scheduled 
for 21 at 11:10, letter redone awaiting signature LW 21 need to cancel 
 referral with Dr Jameson photo emailed, letter sent to BETTY [place] 21
 

 

           Laboratory test finding (navigational concept) Laboratory test result

                                  

[facility] ([facility])    

 

                                        Refer Dr Jameson letter awaiting signatu

re LW 21 photo emailed, letter sent

 awaiting appt lw 21 wife called inquiring on appt with Dr Jameson, I spoke
 with Devika and Dr Jameson is out for 2 weeks and Mervin is out this week.  She
 will call his wife and get him scheduled  LW 21 Patient's wife came into 
the office today and wanted referral resent to Dr Zayas and he is scheduled 
for 21 at 11:10, letter redone awaiting signature LW 21 need to cancel 
 referral with Dr Jameson photo emailed, letter sent to BETTY [place] 21
 









                    ID                  Date                Data Source

 

                    [id number]              2021 04:07:00 PM EDT [facility] ([facility]

[facility])









          Name      Value     Range     Interpretation Code Description Data Sejal

rce(s) Supporting 

Document(s)

 

           Laboratory test finding (navigational concept) Laboratory test result

                                  

[facility] ([facility])    









                    ID                  Date                Data Source

 

                    [id number]            2021 05:46:00 PM EDT [facility]

 

                                        DATE OF EXAM: 1ABDOMEN SINGLE V

IEW INDICATION: Incontinence. TECHNIQUE:

 Two supine films of the abdomen were obtained. FINDINGS: Gas is seen diffusely 
throughout nondilated loops of large and small bowel.  There are no dilated 
loops of bowel.  No abnormal abdominal calcifications are present.  Prostate 
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  The lung bases
 are clear.  Scoliosis and lumbar spine degenerative changes are present. 
IMPRESSION: Nonobstructive bowel gas pattern. Professional interpretation 
performed at [facility] [phone].End of diagnostic report for 
accession:  [id number] Interpreted: Denise Peter MDTranscribed: 2021
 05:45 PMSigned:      2021 05:46 PM  Denise Peter MD    
-------------------------------------
-------------------------------------------MRN:  [id number] [facility] #  
[id number] [facility] # [id number] 









          Name      Value     Range     Interpretation Code Description Data Sejal

rce(s) Supporting 

Document(s)

 

                                                                       







                                        Procedure

 

                                          



                                                                                
                                                          



Social History

          No Information                                                        
                                



Vital Signs

          



                    ID                  Date                Data Source

 

                    UNK                                      









           Name       Value      Range      Interpretation Code Description Data

 Source(s)

 

           Oxygen saturation in Arterial blood by Pulse oximetry 95 %           

                  95 %       [facility]

 

           Heart rate 60 /min                          60 /min    [facility]

 

           Systolic blood pressure 131 mm[Hg]                       131 mm[Hg] M

[facility]

 

           Diastolic blood pressure 49 mm[Hg]                        49 mm[Hg]  

[facility]

 

           Oxygen saturation in Arterial blood by Pulse oximetry 95 %           

                  95 %       [facility]

 

           Heart rate 53 /min                          53 /min    [facility]

[facility])

 

           Systolic blood pressure 150 mm[Hg]                       150 mm[Hg] M

[facility] ([facility]

 

           Diastolic blood pressure 58 mm[Hg]                        58 mm[Hg]  

[facility]

 

           Systolic blood pressure 151 mm[Hg]                       151 mm[Hg] M

[facility]

 

           Diastolic blood pressure 77 mm[Hg]                        77 mm[Hg]  

[facility] ([facility]

 

           Heart rate 61 /min                          61 /min    [facility] ([facility])

 

           Body weight 150.00 [lb_av]                       150.00 [lb_av] [facility]

T ([facility]

 

           Ideal body weight 172 [lb_av]                       172 [lb_av] [facility]

T ([facility])

 

           Body weight 69.401 kg                        69.401 kg  [facility] ([facility])

 

           Body surface area Derived from formula 1.88 m2                       

   1.88 m2    [facility] ([facility])

 

           Systolic blood pressure 138 mm[Hg]                       138 mm[Hg] [facility] ([facility])

 

           Diastolic blood pressure 66 mm[Hg]                        66 mm[Hg]  

[facility] ([facility])

 

           Body temperature 98.9 [degF]                       98.9 [degF] [facility]

 ([facility])

 

           Body height 71 [in_i]                        71 [in_i]  [facility] ([facility])

 

                                        5'11" 

 

           Body weight 153.00 [lb_av]                       153.00 [lb_av] [facility]

T ([facility])

 

           Body mass index (BMI) [Ratio] 21.3 kg/m2                       21.3 k

g/m2 [facility] ([facility])

 

           Body mass index (BMI) [Ratio] 21.5 kg/m2                       21.5 k

g/m2 [facility] ([facility])

 

           Systolic blood pressure 173 mm[Hg]                       173 mm[Hg] [facility] ([facility])

 

           Diastolic blood pressure 62 mm[Hg]                        62 mm[Hg]  

[facility] ([facility])

 

           Body temperature 98.4 [degF]                       98.4 [degF] [facility]

 ([facility])

 

           Body height 71 [in_i]                        71 [in_i]  [facility] ([facility])

 

                                        5'11" 

 

           [facility] 172 [lb[facility]av]                       172 [facility]

 

           Body weight 69.854 kg                        69.854 kg  [facility] ([facility])

 

           Body surface area Derived from formula 1.89 m2                       

   1.89 m2    [facility]

 

           Body weight 154.00 [lb_av]                       154.00 [lb_av] [facility]

T ([facility])

 

           Body weight 150.00 [lb_av]                       150.00 [lb_av] [facility]

T ([facility]

 

           Respiratory rate 17 /min                          17 /min    [facility] (

[facility])

 

           Systolic blood pressure 148 mm[Hg]                       148 mm[Hg] M

[facility] ([facility])

 

           Diastolic blood pressure 78 mm[Hg]                        78 mm[Hg]  

[facility] ([facility]

 

           Body weight 150.00 [lb_av]                       150.00 [lb_av] [facility]

T ([facility]

 

           Respiratory rate 17 /min                          17 /min    [facility] (

[facility])

 

           Body temperature 96.8 [degF]                       96.8 [degF] [facility]

 ([facility])

 

           Systolic blood pressure 150 mm[Hg]                       150 mm[Hg] M

[facility] ([facility])

 

           Diastolic blood pressure 60 mm[Hg]                        60 mm[Hg]  

[facility] ([facility])

 

           Body height 68.5 [in_i]                       68.5 [in_i] [facility]

 

                                        5'8.50" 

 

           Body temperature 97.3 [degF]                       97.3 [degF] [facility]

 ([facility] 

)

 

           Body mass index (BMI) [Ratio] 21.6 kg/m2                       21.6 k

g/m2 [facility] ([facility])

 

           Body weight 144.50 [lb_av]                       144.50 [lb_av] [facility]

T ([facility])

 

           Body weight 155.00 [lb_av]                       155.00 [lb_av] [facility]

T ([facility]

 

           Systolic blood pressure 151 mm[Hg]                       151 mm[Hg] M

[facility] ([facility]

 

           Diastolic blood pressure 66 mm[Hg]                        66 mm[Hg]  

[facility] ([facility]

 

           Body temperature 96.8 [degF]                       96.8 [degF] [facility]

 ([facility]

 

           Diastolic blood pressure 62 mm[Hg]                        62 mm[Hg]  

[facility] ([facility])

 

           Body height 72 [in_i]                        72 [in_i]  [facility] ([facility])

 

           Systolic blood pressure 148 mm[Hg]                       148 mm[Hg] [facility]

[facility] ([facility])

 

           Body height 72 [in_i]                        72 [in_i]  [facility] ([facility])

 

           Body height 72 [in_i]                        72 [in_i]  [facility] ([facility])



                                                                                
                  



Patient Treatment Plan of Care

          



             Planned Activity Planned Date Details      Description  Data Source

(s)

 

                          Brimonidine tartrate 2 MG/ML / Timolol 5 MG/ML Ophthal

jesika Solution [Combigan] 

2021 12:00:00 AM EDT                                         [facility]ERN (Lino Munguia MD [facility])

 

                    travoprost 0.04 MG/ML Ophthalmic Solution [Travatan] 

021 12:00:00 AM EDT 

                                                    [facility]ERN (Gavin Munguia MD [facility])

 

                          Brimonidine tartrate 2 MG/ML / Timolol 5 MG/ML Ophthal

jesika Solution [Combigan] 

2021 12:00:00 AM EDT                                         [facility] (Lino Munguia MD [facility])

 

                          Brimonidine tartrate 2 MG/ML / Timolol 5 MG/ML Ophthal

jesika Solution [Combigan] 

06/15/2021 12:00:00 AM EDT                                         [facility] (Lino Munguia MD [facility])

 

                          Brimonidine tartrate 2 MG/ML / Timolol 5 MG/ML Ophthal

jesika Solution [Combigan] 

05/10/2021 12:00:00 AM EDT                                         [facility] (Lino Munguia MD [facility])

 

                          Brimonidine tartrate 2 MG/ML / Timolol 5 MG/ML Ophthal

jesika Solution [Combigan] 

2020 12:00:00 AM EDT                                         [facility] (Lino Munguia MD [facility])

 

                    travoprost 0.04 MG/ML Ophthalmic Solution [Travatan] 

019 12:00:00 AM EST 

                                                    [facility] (Gavin Munguia MD [facility])

## 2022-01-11 ENCOUNTER — HOSPITAL ENCOUNTER (OUTPATIENT)
Dept: HOSPITAL 53 - M SMT | Age: 87
End: 2022-01-11
Attending: PHYSICIAN ASSISTANT
Payer: MEDICARE

## 2022-01-11 DIAGNOSIS — N30.90: Primary | ICD-10-CM

## 2022-01-11 LAB
APPEARANCE UR: CLEAR
BACTERIA UR QL AUTO: NEGATIVE
BILIRUB UR QL STRIP.AUTO: NEGATIVE
GLUCOSE UR QL STRIP.AUTO: NEGATIVE MG/DL
HGB UR QL STRIP.AUTO: (no result)
KETONES UR QL STRIP.AUTO: NEGATIVE MG/DL
LEUKOCYTE ESTERASE UR QL STRIP.AUTO: NEGATIVE
NITRITE UR QL STRIP.AUTO: NEGATIVE
PH UR STRIP.AUTO: 5 UNITS (ref 5–9)
PROT UR QL STRIP.AUTO: (no result) MG/DL
RBC # UR AUTO: 2 /HPF (ref 0–3)
SP GR UR STRIP.AUTO: 1.01 (ref 1–1.03)
SQUAMOUS #/AREA URNS AUTO: 0 /HPF (ref 0–6)
UROBILINOGEN UR QL STRIP.AUTO: 0.2 MG/DL (ref 0–2)
WBC #/AREA URNS AUTO: 0 /HPF (ref 0–3)

## 2022-03-06 ENCOUNTER — HOSPITAL ENCOUNTER (OUTPATIENT)
Dept: HOSPITAL 53 - M LAB REF | Age: 87
End: 2022-03-06
Attending: PHYSICIAN ASSISTANT
Payer: MEDICARE

## 2022-03-06 DIAGNOSIS — R30.0: Primary | ICD-10-CM

## 2022-03-28 ENCOUNTER — HOSPITAL ENCOUNTER (OUTPATIENT)
Dept: HOSPITAL 53 - M SMT | Age: 87
End: 2022-03-28
Attending: UROLOGY
Payer: MEDICARE

## 2022-03-28 DIAGNOSIS — R31.0: Primary | ICD-10-CM

## 2022-09-08 ENCOUNTER — HOSPITAL ENCOUNTER (OUTPATIENT)
Dept: HOSPITAL 53 - M PLALAB | Age: 87
End: 2022-09-08
Attending: FAMILY MEDICINE
Payer: MEDICARE

## 2022-09-08 DIAGNOSIS — R97.20: ICD-10-CM

## 2022-09-08 DIAGNOSIS — E78.00: ICD-10-CM

## 2022-09-08 DIAGNOSIS — E31.9: Primary | ICD-10-CM

## 2022-09-08 LAB
ALBUMIN SERPL BCG-MCNC: 3.2 GM/DL (ref 3.2–5.2)
ALT SERPL W P-5'-P-CCNC: 16 U/L (ref 12–78)
BILIRUB SERPL-MCNC: 0.7 MG/DL (ref 0.2–1)
BUN SERPL-MCNC: 38 MG/DL (ref 7–18)
CALCIUM SERPL-MCNC: 8.1 MG/DL (ref 8.8–10.2)
CHLORIDE SERPL-SCNC: 108 MEQ/L (ref 98–107)
CHOLEST SERPL-MCNC: 187 MG/DL (ref ?–200)
CHOLEST/HDLC SERPL: 4.25 {RATIO} (ref ?–5)
CO2 SERPL-SCNC: 30 MEQ/L (ref 21–32)
CREAT SERPL-MCNC: 1.31 MG/DL (ref 0.7–1.3)
EST. AVERAGE GLUCOSE BLD GHB EST-MCNC: 126 MG/DL (ref 60–110)
GFR SERPL CREATININE-BSD FRML MDRD: 55.1 ML/MIN/{1.73_M2} (ref 35–?)
GLUCOSE SERPL-MCNC: 96 MG/DL (ref 70–100)
HCT VFR BLD AUTO: 30.3 % (ref 42–52)
HDLC SERPL-MCNC: 44 MG/DL (ref 40–?)
HGB BLD-MCNC: 10 G/DL (ref 13.5–17.5)
LDLC SERPL CALC-MCNC: 103 MG/DL (ref ?–100)
MCH RBC QN AUTO: 33.1 PG (ref 27–33)
MCHC RBC AUTO-ENTMCNC: 33 G/DL (ref 32–36.5)
MCV RBC AUTO: 100.3 FL (ref 80–96)
NONHDLC SERPL-MCNC: 143 MG/DL
PLATELET # BLD AUTO: 145 10^3/UL (ref 150–450)
POTASSIUM SERPL-SCNC: 4.5 MEQ/L (ref 3.5–5.1)
PROT SERPL-MCNC: 6.1 GM/DL (ref 6.4–8.2)
PSA SERPL-MCNC: < 0.01 NG/ML (ref ?–4)
RBC # BLD AUTO: 3.02 10^6/UL (ref 4.3–6.1)
SODIUM SERPL-SCNC: 139 MEQ/L (ref 136–145)
TRIGL SERPL-MCNC: 200 MG/DL (ref ?–150)
WBC # BLD AUTO: 5.9 10^3/UL (ref 4–10)

## 2022-12-02 ENCOUNTER — HOSPITAL ENCOUNTER (EMERGENCY)
Dept: HOSPITAL 53 - M ED | Age: 87
LOS: 1 days | Discharge: HOME | End: 2022-12-03
Payer: COMMERCIAL

## 2022-12-02 VITALS — BODY MASS INDEX: 20.36 KG/M2 | WEIGHT: 150.31 LBS | HEIGHT: 72 IN

## 2022-12-02 VITALS — DIASTOLIC BLOOD PRESSURE: 65 MMHG | SYSTOLIC BLOOD PRESSURE: 144 MMHG

## 2022-12-02 DIAGNOSIS — L76.82: Primary | ICD-10-CM

## 2022-12-02 DIAGNOSIS — I10: ICD-10-CM

## 2022-12-02 DIAGNOSIS — J44.9: ICD-10-CM

## 2022-12-02 DIAGNOSIS — H40.9: ICD-10-CM

## 2022-12-02 DIAGNOSIS — D69.6: ICD-10-CM

## 2022-12-02 DIAGNOSIS — E11.9: ICD-10-CM

## 2022-12-02 DIAGNOSIS — Z79.82: ICD-10-CM

## 2022-12-02 DIAGNOSIS — Z79.899: ICD-10-CM

## 2022-12-02 DIAGNOSIS — Z88.2: ICD-10-CM

## 2022-12-02 DIAGNOSIS — S05.11XA: ICD-10-CM

## 2022-12-02 LAB
BASOPHILS # BLD AUTO: 0 10^3/UL (ref 0–0.2)
BASOPHILS NFR BLD AUTO: 0.4 % (ref 0–1)
BUN SERPL-MCNC: 34 MG/DL (ref 9–23)
CALCIUM SERPL-MCNC: 8 MG/DL (ref 8.3–10.6)
CHLORIDE SERPL-SCNC: 104 MMOL/L (ref 98–107)
CO2 SERPL-SCNC: 30 MMOL/L (ref 20–31)
CREAT SERPL-MCNC: 1.29 MG/DL (ref 0.7–1.3)
EOSINOPHIL # BLD AUTO: 0 10^3/UL (ref 0–0.5)
EOSINOPHIL NFR BLD AUTO: 0 % (ref 0–3)
GFR SERPL CREATININE-BSD FRML MDRD: 56.1 ML/MIN/{1.73_M2} (ref 35–?)
GLUCOSE SERPL-MCNC: 218 MG/DL (ref 74–106)
HCT VFR BLD AUTO: 32.4 % (ref 42–52)
HGB BLD-MCNC: 10.7 G/DL (ref 13.5–17.5)
INR PPP: 0.99
LYMPHOCYTES # BLD AUTO: 1.5 10^3/UL (ref 1.5–5)
LYMPHOCYTES NFR BLD AUTO: 27.9 % (ref 24–44)
MCH RBC QN AUTO: 33.9 PG (ref 27–33)
MCHC RBC AUTO-ENTMCNC: 33 G/DL (ref 32–36.5)
MCV RBC AUTO: 102.5 FL (ref 80–96)
MONOCYTES # BLD AUTO: 0.6 10^3/UL (ref 0–0.8)
MONOCYTES NFR BLD AUTO: 12 % (ref 2–8)
NEUTROPHILS # BLD AUTO: 2.9 10^3/UL (ref 1.5–8.5)
NEUTROPHILS NFR BLD AUTO: 56.3 % (ref 36–66)
PLATELET # BLD AUTO: 163 10^3/UL (ref 150–450)
POTASSIUM SERPL-SCNC: 4.5 MMOL/L (ref 3.5–5.1)
PROTHROMBIN TIME: 13.3 SECONDS (ref 12.5–14.5)
RBC # BLD AUTO: 3.16 10^6/UL (ref 4.3–6.1)
SODIUM SERPL-SCNC: 141 MMOL/L (ref 136–145)
WBC # BLD AUTO: 5.2 10^3/UL (ref 4–10)

## 2022-12-02 PROCEDURE — 85610 PROTHROMBIN TIME: CPT

## 2022-12-02 PROCEDURE — 36415 COLL VENOUS BLD VENIPUNCTURE: CPT

## 2022-12-02 PROCEDURE — 70486 CT MAXILLOFACIAL W/O DYE: CPT

## 2022-12-02 PROCEDURE — 99281 EMR DPT VST MAYX REQ PHY/QHP: CPT

## 2022-12-02 PROCEDURE — 85025 COMPLETE CBC W/AUTO DIFF WBC: CPT

## 2022-12-02 PROCEDURE — 80048 BASIC METABOLIC PNL TOTAL CA: CPT

## 2022-12-06 ENCOUNTER — HOSPITAL ENCOUNTER (OUTPATIENT)
Dept: HOSPITAL 53 - M LAB | Age: 87
End: 2022-12-06
Attending: DERMATOLOGY
Payer: COMMERCIAL

## 2022-12-06 DIAGNOSIS — L76.21: Primary | ICD-10-CM

## 2022-12-06 LAB
APTT BLD: 35 SECONDS (ref 24.8–34.2)
BASOPHILS # BLD AUTO: 0 10^3/UL (ref 0–0.2)
BASOPHILS NFR BLD AUTO: 0.3 % (ref 0–1)
CLOSURE TME COLL+ADP BLD: 130 SECONDS (ref 56–103)
CLOSURE TME COLL+EPINEP BLD: > 300 SECONDS (ref 74–162)
EOSINOPHIL # BLD AUTO: 0 10^3/UL (ref 0–0.5)
EOSINOPHIL NFR BLD AUTO: 0 % (ref 0–3)
HCT VFR BLD AUTO: 30.4 % (ref 42–52)
HGB BLD-MCNC: 9.8 G/DL (ref 13.5–17.5)
INR PPP: 0.91
LYMPHOCYTES # BLD AUTO: 1.6 10^3/UL (ref 1.5–5)
LYMPHOCYTES NFR BLD AUTO: 24.4 % (ref 24–44)
MCH RBC QN AUTO: 33.2 PG (ref 27–33)
MCHC RBC AUTO-ENTMCNC: 32.2 G/DL (ref 32–36.5)
MCV RBC AUTO: 103.1 FL (ref 80–96)
MONOCYTES # BLD AUTO: 0.7 10^3/UL (ref 0–0.8)
MONOCYTES NFR BLD AUTO: 10.4 % (ref 2–8)
NEUTROPHILS # BLD AUTO: 4.3 10^3/UL (ref 1.5–8.5)
NEUTROPHILS NFR BLD AUTO: 63.3 % (ref 36–66)
PLATELET # BLD AUTO: 188 10^3/UL (ref 150–450)
PROTHROMBIN TIME: 12.5 SECONDS (ref 12.5–14.5)
RBC # BLD AUTO: 2.95 10^6/UL (ref 4.3–6.1)
WBC # BLD AUTO: 6.7 10^3/UL (ref 4–10)

## 2022-12-07 ENCOUNTER — HOSPITAL ENCOUNTER (OUTPATIENT)
Dept: HOSPITAL 53 - M INFU | Age: 87
Discharge: HOME | End: 2022-12-07
Attending: INTERNAL MEDICINE
Payer: MEDICARE

## 2022-12-07 VITALS — DIASTOLIC BLOOD PRESSURE: 76 MMHG | SYSTOLIC BLOOD PRESSURE: 168 MMHG

## 2022-12-07 VITALS — DIASTOLIC BLOOD PRESSURE: 68 MMHG | SYSTOLIC BLOOD PRESSURE: 150 MMHG

## 2022-12-07 VITALS — DIASTOLIC BLOOD PRESSURE: 67 MMHG | SYSTOLIC BLOOD PRESSURE: 165 MMHG

## 2022-12-07 VITALS — HEIGHT: 72 IN | WEIGHT: 149.91 LBS | BODY MASS INDEX: 20.31 KG/M2

## 2022-12-07 VITALS — SYSTOLIC BLOOD PRESSURE: 176 MMHG | DIASTOLIC BLOOD PRESSURE: 77 MMHG

## 2022-12-07 DIAGNOSIS — Z88.2: ICD-10-CM

## 2022-12-07 DIAGNOSIS — D46.9: Primary | ICD-10-CM

## 2022-12-16 ENCOUNTER — HOSPITAL ENCOUNTER (OUTPATIENT)
Dept: HOSPITAL 53 - M SFHCDERM | Age: 87
End: 2022-12-16
Attending: DERMATOLOGY
Payer: MEDICARE

## 2022-12-16 DIAGNOSIS — Z48.02: Primary | ICD-10-CM

## 2023-03-21 ENCOUNTER — HOSPITAL ENCOUNTER (OUTPATIENT)
Dept: HOSPITAL 53 - M PLALAB | Age: 88
End: 2023-03-21
Attending: FAMILY MEDICINE
Payer: MEDICARE

## 2023-03-21 DIAGNOSIS — E11.9: Primary | ICD-10-CM

## 2023-03-21 LAB
ALBUMIN SERPL BCG-MCNC: 3.4 G/DL (ref 3.2–5.2)
ALP SERPL-CCNC: 93 U/L (ref 46–116)
ALT SERPL W P-5'-P-CCNC: 15 U/L (ref 7–40)
AST SERPL-CCNC: 19 U/L (ref ?–34)
BILIRUB SERPL-MCNC: 0.7 MG/DL (ref 0.3–1.2)
BUN SERPL-MCNC: 41 MG/DL (ref 9–23)
CALCIUM SERPL-MCNC: 8.6 MG/DL (ref 8.3–10.6)
CHLORIDE SERPL-SCNC: 106 MMOL/L (ref 98–107)
CO2 SERPL-SCNC: 31 MMOL/L (ref 20–31)
CREAT SERPL-MCNC: 1.3 MG/DL (ref 0.7–1.3)
CREAT UR-MCNC: 54.6 MG/DL
EST. AVERAGE GLUCOSE BLD GHB EST-MCNC: 120 MG/DL (ref 60–110)
GFR SERPL CREATININE-BSD FRML MDRD: 55.6 ML/MIN/{1.73_M2} (ref 35–?)
GLUCOSE SERPL-MCNC: 108 MG/DL (ref 74–106)
MICROALBUMIN UR-MCNC: 1739 MG/L
MICROALBUMIN/CREAT UR: 3184.9 MCG/MG (ref 0–30)
POTASSIUM SERPL-SCNC: 4.9 MMOL/L (ref 3.5–5.1)
PROT SERPL-MCNC: 6.2 G/DL (ref 5.7–8.2)
SODIUM SERPL-SCNC: 142 MMOL/L (ref 136–145)

## 2023-12-11 ENCOUNTER — HOSPITAL ENCOUNTER (OUTPATIENT)
Dept: HOSPITAL 53 - M SFHCDERM | Age: 88
End: 2023-12-11
Attending: PHYSICIAN ASSISTANT
Payer: MEDICARE

## 2023-12-11 DIAGNOSIS — C44.41: Primary | ICD-10-CM

## 2024-03-25 ENCOUNTER — HOSPITAL ENCOUNTER (OUTPATIENT)
Dept: HOSPITAL 53 - M PLALAB | Age: 89
End: 2024-03-25
Attending: FAMILY MEDICINE
Payer: MEDICARE

## 2024-03-25 DIAGNOSIS — E11.9: Primary | ICD-10-CM

## 2024-03-25 LAB
ALBUMIN SERPL BCG-MCNC: 3.1 G/DL (ref 3.2–5.2)
ALP SERPL-CCNC: 91 U/L (ref 46–116)
ALT SERPL W P-5'-P-CCNC: 11 U/L (ref 7–40)
AST SERPL-CCNC: 20 U/L (ref ?–34)
BASOPHILS # BLD AUTO: 0 10^3/UL (ref 0–0.2)
BASOPHILS NFR BLD AUTO: 0.2 % (ref 0–1)
BILIRUB SERPL-MCNC: 0.9 MG/DL (ref 0.3–1.2)
BUN SERPL-MCNC: 37 MG/DL (ref 9–23)
CALCIUM SERPL-MCNC: 8.5 MG/DL (ref 8.3–10.6)
CHLORIDE SERPL-SCNC: 105 MMOL/L (ref 98–107)
CHOLEST SERPL-MCNC: 202 MG/DL (ref ?–200)
CHOLEST/HDLC SERPL: 4.42 {RATIO} (ref ?–5)
CO2 SERPL-SCNC: 32 MMOL/L (ref 20–31)
CREAT SERPL-MCNC: 1.45 MG/DL (ref 0.7–1.3)
EOSINOPHIL # BLD AUTO: 0 10^3/UL (ref 0–0.5)
EOSINOPHIL NFR BLD AUTO: 0 % (ref 0–3)
EST. AVERAGE GLUCOSE BLD GHB EST-MCNC: 108 MG/DL (ref 60–110)
GFR SERPL CREATININE-BSD FRML MDRD: 48.9 ML/MIN/{1.73_M2} (ref 35–?)
GLUCOSE SERPL-MCNC: 101 MG/DL (ref 74–106)
HCT VFR BLD AUTO: 33.3 % (ref 42–52)
HDLC SERPL-MCNC: 45.6 MG/DL (ref 40–?)
HGB BLD-MCNC: 10.9 G/DL (ref 13.5–17.5)
LDLC SERPL CALC-MCNC: 111.8 MG/DL (ref ?–100)
LYMPHOCYTES # BLD AUTO: 1.4 10^3/UL (ref 1.5–5)
LYMPHOCYTES NFR BLD AUTO: 23.2 % (ref 24–44)
MCH RBC QN AUTO: 34.3 PG (ref 27–33)
MCHC RBC AUTO-ENTMCNC: 32.7 G/DL (ref 32–36.5)
MCV RBC AUTO: 104.7 FL (ref 80–96)
MONOCYTES # BLD AUTO: 0.7 10^3/UL (ref 0–0.8)
MONOCYTES NFR BLD AUTO: 10.8 % (ref 2–8)
NEUTROPHILS # BLD AUTO: 3.9 10^3/UL (ref 1.5–8.5)
NEUTROPHILS NFR BLD AUTO: 63.5 % (ref 36–66)
NONHDLC SERPL-MCNC: 156.4 MG/DL
PLATELET # BLD AUTO: 162 10^3/UL (ref 150–450)
POTASSIUM SERPL-SCNC: 4.7 MMOL/L (ref 3.5–5.1)
PROT SERPL-MCNC: 5.9 G/DL (ref 5.7–8.2)
RBC # BLD AUTO: 3.18 10^6/UL (ref 4.3–6.1)
SODIUM SERPL-SCNC: 141 MMOL/L (ref 136–145)
TRIGL SERPL-MCNC: 223 MG/DL (ref ?–150)
WBC # BLD AUTO: 6.1 10^3/UL (ref 4–10)

## 2024-04-16 ENCOUNTER — HOSPITAL ENCOUNTER (OUTPATIENT)
Dept: HOSPITAL 53 - M RAD | Age: 89
End: 2024-04-16
Attending: PHYSICIAN ASSISTANT
Payer: MEDICARE

## 2024-04-16 DIAGNOSIS — M79.604: Primary | ICD-10-CM

## 2024-04-29 ENCOUNTER — HOSPITAL ENCOUNTER (OUTPATIENT)
Dept: HOSPITAL 53 - M SMT | Age: 89
End: 2024-04-29
Payer: MEDICARE

## 2024-04-29 DIAGNOSIS — R31.9: Primary | ICD-10-CM

## 2024-04-29 LAB
APPEARANCE UR: (no result)
BACTERIA UR QL AUTO: (no result)
BILIRUB UR QL STRIP.AUTO: NEGATIVE
GLUCOSE UR QL STRIP.AUTO: NEGATIVE MG/DL
HGB UR QL STRIP.AUTO: (no result)
KETONES UR QL STRIP.AUTO: NEGATIVE MG/DL
LEUKOCYTE ESTERASE UR QL STRIP.AUTO: (no result)
NITRITE UR QL STRIP.AUTO: NEGATIVE
PH UR STRIP.AUTO: 6 UNITS (ref 5–9)
PROT UR QL STRIP.AUTO: (no result) MG/DL
RBC # UR AUTO: (no result) /HPF (ref 0–3)
SP GR UR STRIP.AUTO: 1.01 (ref 1–1.03)
SQUAMOUS #/AREA URNS AUTO: 0 /HPF (ref 0–6)
UROBILINOGEN UR QL STRIP.AUTO: 0.2 MG/DL (ref 0–2)
WBC #/AREA URNS AUTO: (no result) /HPF (ref 0–3)

## 2024-05-14 ENCOUNTER — HOSPITAL ENCOUNTER (OUTPATIENT)
Dept: HOSPITAL 53 - M RAD | Age: 89
End: 2024-05-14
Attending: PHYSICIAN ASSISTANT
Payer: MEDICARE

## 2024-05-14 DIAGNOSIS — M51.37: Primary | ICD-10-CM

## 2024-05-16 ENCOUNTER — HOSPITAL ENCOUNTER (INPATIENT)
Dept: HOSPITAL 53 - M ED | Age: 89
LOS: 5 days | Discharge: HOME HEALTH | DRG: 190 | End: 2024-05-21
Attending: INTERNAL MEDICINE | Admitting: INTERNAL MEDICINE
Payer: MEDICARE

## 2024-05-16 VITALS — HEIGHT: 71 IN | BODY MASS INDEX: 21.54 KG/M2 | WEIGHT: 153.88 LBS

## 2024-05-16 DIAGNOSIS — R91.1: ICD-10-CM

## 2024-05-16 DIAGNOSIS — I12.9: ICD-10-CM

## 2024-05-16 DIAGNOSIS — R00.1: ICD-10-CM

## 2024-05-16 DIAGNOSIS — J84.9: ICD-10-CM

## 2024-05-16 DIAGNOSIS — D50.9: ICD-10-CM

## 2024-05-16 DIAGNOSIS — H40.9: ICD-10-CM

## 2024-05-16 DIAGNOSIS — I44.1: ICD-10-CM

## 2024-05-16 DIAGNOSIS — Z88.2: ICD-10-CM

## 2024-05-16 DIAGNOSIS — Z79.84: ICD-10-CM

## 2024-05-16 DIAGNOSIS — E78.5: ICD-10-CM

## 2024-05-16 DIAGNOSIS — M48.062: ICD-10-CM

## 2024-05-16 DIAGNOSIS — F17.210: ICD-10-CM

## 2024-05-16 DIAGNOSIS — N17.9: ICD-10-CM

## 2024-05-16 DIAGNOSIS — T44.7X5A: ICD-10-CM

## 2024-05-16 DIAGNOSIS — E11.22: ICD-10-CM

## 2024-05-16 DIAGNOSIS — N18.30: ICD-10-CM

## 2024-05-16 DIAGNOSIS — Z85.828: ICD-10-CM

## 2024-05-16 DIAGNOSIS — J96.01: ICD-10-CM

## 2024-05-16 DIAGNOSIS — J44.1: Primary | ICD-10-CM

## 2024-05-16 DIAGNOSIS — Z85.46: ICD-10-CM

## 2024-05-16 DIAGNOSIS — D46.9: ICD-10-CM

## 2024-05-16 DIAGNOSIS — I27.20: ICD-10-CM

## 2024-05-16 DIAGNOSIS — E88.09: ICD-10-CM

## 2024-05-16 DIAGNOSIS — Z79.899: ICD-10-CM

## 2024-05-16 LAB
ALBUMIN SERPL BCG-MCNC: 2.8 G/DL (ref 3.2–5.2)
ALP SERPL-CCNC: 104 U/L (ref 46–116)
ALT SERPL W P-5'-P-CCNC: 13 U/L (ref 7–40)
AST SERPL-CCNC: 22 U/L (ref ?–34)
BASE EXCESS BLDV CALC-SCNC: -0.2 MMOL/L (ref -2–2)
BASOPHILS # BLD AUTO: 0 10^3/UL (ref 0–0.2)
BASOPHILS NFR BLD AUTO: 0.1 % (ref 0–1)
BILIRUB CONJ SERPL-MCNC: 0.1 MG/DL (ref ?–0.4)
BILIRUB SERPL-MCNC: 0.6 MG/DL (ref 0.3–1.2)
BUN SERPL-MCNC: 37 MG/DL (ref 9–23)
CALCIUM SERPL-MCNC: 8.1 MG/DL (ref 8.3–10.6)
CHLORIDE SERPL-SCNC: 108 MMOL/L (ref 98–107)
CK MB CFR.DF SERPL CALC: 4.53
CK MB SERPL-MCNC: 2.9 NG/ML (ref ?–3.6)
CK SERPL-CCNC: 64 U/L (ref 46–171)
CO2 BLDV CALC-SCNC: 27.7 MMOL/L (ref 24–28)
CO2 SERPL-SCNC: 29 MMOL/L (ref 20–31)
CREAT SERPL-MCNC: 1.28 MG/DL (ref 0.7–1.3)
EOSINOPHIL # BLD AUTO: 0 10^3/UL (ref 0–0.5)
EOSINOPHIL NFR BLD AUTO: 0 % (ref 0–3)
GFR SERPL CREATININE-BSD FRML MDRD: 56.5 ML/MIN/{1.73_M2} (ref 35–?)
GLUCOSE SERPL-MCNC: 189 MG/DL (ref 74–106)
HCO3 BLDV-SCNC: 26.1 MMOL/L (ref 23–27)
HCO3 STD BLDV-SCNC: 24.3 MMOL/L
HCT VFR BLD AUTO: 27.3 % (ref 42–52)
HGB BLD-MCNC: 9.3 G/DL (ref 13.5–17.5)
LYMPHOCYTES # BLD AUTO: 1.3 10^3/UL (ref 1.5–5)
LYMPHOCYTES NFR BLD AUTO: 18.7 % (ref 24–44)
MCH RBC QN AUTO: 35 PG (ref 27–33)
MCHC RBC AUTO-ENTMCNC: 34.1 G/DL (ref 32–36.5)
MCV RBC AUTO: 102.6 FL (ref 80–96)
MONOCYTES # BLD AUTO: 0.7 10^3/UL (ref 0–0.8)
MONOCYTES NFR BLD AUTO: 10.7 % (ref 2–8)
NEUTROPHILS # BLD AUTO: 4.5 10^3/UL (ref 1.5–8.5)
NEUTROPHILS NFR BLD AUTO: 65.8 % (ref 36–66)
PCO2 BLDV: 50.7 MMHG (ref 38–50)
PH BLDV: 7.33 UNITS (ref 7.33–7.43)
PLATELET # BLD AUTO: 214 10^3/UL (ref 150–450)
PO2 BLDV: 82 MMHG (ref 30–50)
POTASSIUM SERPL-SCNC: 4.6 MMOL/L (ref 3.5–5.1)
PROT SERPL-MCNC: 5.3 G/DL (ref 5.7–8.2)
RBC # BLD AUTO: 2.66 10^6/UL (ref 4.3–6.1)
SAO2 % BLDV: 94.4 % (ref 60–80)
SODIUM SERPL-SCNC: 140 MMOL/L (ref 136–145)
T4 SERPL-MCNC: 7.5 UG/DL (ref 4.5–10.9)
TSH SERPL DL<=0.005 MIU/L-ACNC: 6.63 UIU/ML (ref 0.55–4.78)
WBC # BLD AUTO: 6.8 10^3/UL (ref 4–10)

## 2024-05-16 RX ADMIN — IPRATROPIUM BROMIDE AND ALBUTEROL SULFATE ONE ML: .5; 3 SOLUTION RESPIRATORY (INHALATION) at 22:13

## 2024-05-17 VITALS — OXYGEN SATURATION: 92 % | DIASTOLIC BLOOD PRESSURE: 58 MMHG | SYSTOLIC BLOOD PRESSURE: 117 MMHG | TEMPERATURE: 97.5 F

## 2024-05-17 VITALS — OXYGEN SATURATION: 97 % | DIASTOLIC BLOOD PRESSURE: 64 MMHG | TEMPERATURE: 97.7 F | SYSTOLIC BLOOD PRESSURE: 158 MMHG

## 2024-05-17 VITALS — SYSTOLIC BLOOD PRESSURE: 163 MMHG | TEMPERATURE: 97.3 F | OXYGEN SATURATION: 97 % | DIASTOLIC BLOOD PRESSURE: 54 MMHG

## 2024-05-17 VITALS — OXYGEN SATURATION: 97 % | TEMPERATURE: 98 F | SYSTOLIC BLOOD PRESSURE: 146 MMHG | DIASTOLIC BLOOD PRESSURE: 62 MMHG

## 2024-05-17 VITALS — SYSTOLIC BLOOD PRESSURE: 127 MMHG | DIASTOLIC BLOOD PRESSURE: 58 MMHG | OXYGEN SATURATION: 92 % | TEMPERATURE: 98 F

## 2024-05-17 VITALS — DIASTOLIC BLOOD PRESSURE: 66 MMHG | OXYGEN SATURATION: 91 % | SYSTOLIC BLOOD PRESSURE: 141 MMHG | TEMPERATURE: 98.2 F

## 2024-05-17 VITALS — DIASTOLIC BLOOD PRESSURE: 54 MMHG | SYSTOLIC BLOOD PRESSURE: 144 MMHG

## 2024-05-17 LAB
ALBUMIN SERPL BCG-MCNC: 2.6 G/DL (ref 3.2–5.2)
ALP SERPL-CCNC: 96 U/L (ref 46–116)
ALT SERPL W P-5'-P-CCNC: 12 U/L (ref 7–40)
APTT BLD: 34.2 SECONDS (ref 24.8–34.2)
AST SERPL-CCNC: 17 U/L (ref ?–34)
BASOPHILS # BLD AUTO: 0 10^3/UL (ref 0–0.2)
BASOPHILS NFR BLD AUTO: 0.2 % (ref 0–1)
BILIRUB SERPL-MCNC: 0.5 MG/DL (ref 0.3–1.2)
BUN SERPL-MCNC: 39 MG/DL (ref 9–23)
CALCIUM SERPL-MCNC: 8.2 MG/DL (ref 8.3–10.6)
CHLORIDE SERPL-SCNC: 108 MMOL/L (ref 98–107)
CK MB CFR.DF SERPL CALC: 3.87
CK MB CFR.DF SERPL CALC: 4.03
CK MB SERPL-MCNC: 1.9 NG/ML (ref ?–3.6)
CK MB SERPL-MCNC: 2.1 NG/ML (ref ?–3.6)
CK SERPL-CCNC: 49 U/L (ref 46–171)
CK SERPL-CCNC: 52 U/L (ref 46–171)
CO2 SERPL-SCNC: 28 MMOL/L (ref 20–31)
CREAT SERPL-MCNC: 1.3 MG/DL (ref 0.7–1.3)
EOSINOPHIL # BLD AUTO: 0 10^3/UL (ref 0–0.5)
EOSINOPHIL NFR BLD AUTO: 0 % (ref 0–3)
FT4I SERPL CALC-MCNC: 2.7 % (ref 1.4–3.8)
GFR SERPL CREATININE-BSD FRML MDRD: 55.5 ML/MIN/{1.73_M2} (ref 35–?)
GLUCOSE SERPL-MCNC: 198 MG/DL (ref 74–106)
HCT VFR BLD AUTO: 27.2 % (ref 42–52)
HGB BLD-MCNC: 9.4 G/DL (ref 13.5–17.5)
INR PPP: 1.05
LYMPHOCYTES # BLD AUTO: 0.6 10^3/UL (ref 1.5–5)
LYMPHOCYTES NFR BLD AUTO: 8.8 % (ref 24–44)
MCH RBC QN AUTO: 34.7 PG (ref 27–33)
MCHC RBC AUTO-ENTMCNC: 34.6 G/DL (ref 32–36.5)
MCV RBC AUTO: 100.4 FL (ref 80–96)
MONOCYTES # BLD AUTO: 0.1 10^3/UL (ref 0–0.8)
MONOCYTES NFR BLD AUTO: 1.9 % (ref 2–8)
NEUTROPHILS # BLD AUTO: 5.3 10^3/UL (ref 1.5–8.5)
NEUTROPHILS NFR BLD AUTO: 84.5 % (ref 36–66)
PLATELET # BLD AUTO: 202 10^3/UL (ref 150–450)
POTASSIUM SERPL-SCNC: 4.7 MMOL/L (ref 3.5–5.1)
PROT SERPL-MCNC: 5.2 G/DL (ref 5.7–8.2)
PROTHROMBIN TIME: 13.4 SECONDS (ref 12.5–14.5)
RBC # BLD AUTO: 2.71 10^6/UL (ref 4.3–6.1)
SODIUM SERPL-SCNC: 139 MMOL/L (ref 136–145)
T3RU NFR SERPL: 34.9 % (ref 22.5–37)
T4 SERPL-MCNC: 7.8 UG/DL (ref 4.5–10.9)
TSH SERPL DL<=0.005 MIU/L-ACNC: 1.91 UIU/ML (ref 0.55–4.78)
WBC # BLD AUTO: 6.3 10^3/UL (ref 4–10)

## 2024-05-17 RX ADMIN — IPRATROPIUM BROMIDE AND ALBUTEROL SULFATE ONE ML: .5; 3 SOLUTION RESPIRATORY (INHALATION) at 00:06

## 2024-05-17 RX ADMIN — SODIUM CHLORIDE SCH UNITS: 4.5 INJECTION, SOLUTION INTRAVENOUS at 08:14

## 2024-05-17 RX ADMIN — INSULIN LISPRO SCH UNITS: 100 INJECTION, SOLUTION INTRAVENOUS; SUBCUTANEOUS at 22:35

## 2024-05-17 RX ADMIN — IPRATROPIUM BROMIDE AND ALBUTEROL SULFATE SCH ML: .5; 3 SOLUTION RESPIRATORY (INHALATION) at 07:49

## 2024-05-17 RX ADMIN — ISOSORBIDE DINITRATE SCH MG: 20 TABLET ORAL at 13:37

## 2024-05-17 RX ADMIN — INSULIN LISPRO SCH UNITS: 100 INJECTION, SOLUTION INTRAVENOUS; SUBCUTANEOUS at 07:30

## 2024-05-17 RX ADMIN — FUROSEMIDE SCH MG: 10 INJECTION, SOLUTION INTRAMUSCULAR; INTRAVENOUS at 08:14

## 2024-05-17 RX ADMIN — GABAPENTIN SCH MG: 400 CAPSULE ORAL at 08:12

## 2024-05-17 RX ADMIN — METHYLPREDNISOLONE SODIUM SUCCINATE ONE MG: 125 INJECTION, POWDER, FOR SOLUTION INTRAMUSCULAR; INTRAVENOUS at 00:06

## 2024-05-18 VITALS — DIASTOLIC BLOOD PRESSURE: 56 MMHG | SYSTOLIC BLOOD PRESSURE: 116 MMHG | OXYGEN SATURATION: 97 % | TEMPERATURE: 97.6 F

## 2024-05-18 VITALS — SYSTOLIC BLOOD PRESSURE: 112 MMHG | DIASTOLIC BLOOD PRESSURE: 53 MMHG | OXYGEN SATURATION: 90 % | TEMPERATURE: 99.6 F

## 2024-05-18 VITALS — OXYGEN SATURATION: 90 %

## 2024-05-18 VITALS — OXYGEN SATURATION: 88 %

## 2024-05-18 VITALS — OXYGEN SATURATION: 87 %

## 2024-05-18 VITALS — OXYGEN SATURATION: 91 %

## 2024-05-18 VITALS — DIASTOLIC BLOOD PRESSURE: 58 MMHG | OXYGEN SATURATION: 96 % | TEMPERATURE: 97.7 F | SYSTOLIC BLOOD PRESSURE: 127 MMHG

## 2024-05-18 VITALS — DIASTOLIC BLOOD PRESSURE: 64 MMHG | SYSTOLIC BLOOD PRESSURE: 134 MMHG

## 2024-05-18 VITALS — OXYGEN SATURATION: 85 %

## 2024-05-18 VITALS — OXYGEN SATURATION: 93 %

## 2024-05-18 VITALS — TEMPERATURE: 96.7 F | OXYGEN SATURATION: 97 % | DIASTOLIC BLOOD PRESSURE: 58 MMHG | SYSTOLIC BLOOD PRESSURE: 128 MMHG

## 2024-05-18 VITALS — OXYGEN SATURATION: 94 %

## 2024-05-18 VITALS — OXYGEN SATURATION: 92 %

## 2024-05-18 VITALS — OXYGEN SATURATION: 88 % | TEMPERATURE: 98.6 F | SYSTOLIC BLOOD PRESSURE: 159 MMHG | DIASTOLIC BLOOD PRESSURE: 72 MMHG

## 2024-05-18 VITALS — OXYGEN SATURATION: 86 %

## 2024-05-18 VITALS — OXYGEN SATURATION: 95 %

## 2024-05-18 VITALS — OXYGEN SATURATION: 89 %

## 2024-05-18 LAB
ALBUMIN SERPL BCG-MCNC: 2.5 G/DL (ref 3.2–5.2)
ALBUMIN SERPL BCG-MCNC: 3.1 G/DL (ref 3.2–5.2)
ALP SERPL-CCNC: 82 U/L (ref 46–116)
ALT SERPL W P-5'-P-CCNC: 11 U/L (ref 7–40)
AST SERPL-CCNC: 19 U/L (ref ?–34)
BILIRUB SERPL-MCNC: 0.3 MG/DL (ref 0.3–1.2)
BUN SERPL-MCNC: 49 MG/DL (ref 9–23)
BUN SERPL-MCNC: 54 MG/DL (ref 9–23)
CALCIUM SERPL-MCNC: 7.8 MG/DL (ref 8.3–10.6)
CALCIUM SERPL-MCNC: 8.2 MG/DL (ref 8.3–10.6)
CHLORIDE SERPL-SCNC: 102 MMOL/L (ref 98–107)
CHLORIDE SERPL-SCNC: 103 MMOL/L (ref 98–107)
CO2 SERPL-SCNC: 26 MMOL/L (ref 20–31)
CO2 SERPL-SCNC: 27 MMOL/L (ref 20–31)
CREAT SERPL-MCNC: 2.02 MG/DL (ref 0.7–1.3)
CREAT SERPL-MCNC: 2.12 MG/DL (ref 0.7–1.3)
GFR SERPL CREATININE-BSD FRML MDRD: 31.5 ML/MIN/{1.73_M2} (ref 35–?)
GFR SERPL CREATININE-BSD FRML MDRD: 33.4 ML/MIN/{1.73_M2} (ref 35–?)
GLUCOSE SERPL-MCNC: 296 MG/DL (ref 74–106)
GLUCOSE SERPL-MCNC: 364 MG/DL (ref 74–106)
HCT VFR BLD AUTO: 24.1 % (ref 42–52)
HCT VFR BLD AUTO: 25.5 % (ref 42–52)
HCT VFR BLD AUTO: 28.3 % (ref 42–52)
HGB BLD-MCNC: 7.9 G/DL (ref 13.5–17.5)
HGB BLD-MCNC: 8.6 G/DL (ref 13.5–17.5)
HGB BLD-MCNC: 9.3 G/DL (ref 13.5–17.5)
IRON SATN MFR SERPL: 36.9 % (ref 19.7–50)
IRON SERPL-MCNC: 89 UG/DL (ref 65–175)
MCH RBC QN AUTO: 33.6 PG (ref 27–33)
MCHC RBC AUTO-ENTMCNC: 32.8 G/DL (ref 32–36.5)
MCV RBC AUTO: 102.6 FL (ref 80–96)
PHOSPHATE SERPL-MCNC: 4.8 MG/DL (ref 2.4–5.1)
PLATELET # BLD AUTO: 176 10^3/UL (ref 150–450)
POTASSIUM SERPL-SCNC: 4.5 MMOL/L (ref 3.5–5.1)
POTASSIUM SERPL-SCNC: 4.8 MMOL/L (ref 3.5–5.1)
PROT SERPL-MCNC: 4.8 G/DL (ref 5.7–8.2)
RBC # BLD AUTO: 2.35 10^6/UL (ref 4.3–6.1)
SODIUM SERPL-SCNC: 136 MMOL/L (ref 136–145)
SODIUM SERPL-SCNC: 138 MMOL/L (ref 136–145)
TIBC SERPL-MCNC: 241 UG/DL (ref 250–425)
WBC # BLD AUTO: 8.9 10^3/UL (ref 4–10)

## 2024-05-18 RX ADMIN — INSULIN DETEMIR ONE UNITS: 100 INJECTION, SOLUTION SUBCUTANEOUS at 15:07

## 2024-05-18 RX ADMIN — SODIUM CHLORIDE SCH MLS/HR: 9 INJECTION, SOLUTION INTRAVENOUS at 14:43

## 2024-05-18 RX ADMIN — IPRATROPIUM BROMIDE AND ALBUTEROL SULFATE SCH ML: .5; 3 SOLUTION RESPIRATORY (INHALATION) at 01:14

## 2024-05-18 RX ADMIN — ATORVASTATIN CALCIUM SCH MG: 20 TABLET, FILM COATED ORAL at 20:45

## 2024-05-18 RX ADMIN — INSULIN LISPRO SCH UNITS: 100 INJECTION, SOLUTION INTRAVENOUS; SUBCUTANEOUS at 20:48

## 2024-05-18 RX ADMIN — INSULIN LISPRO SCH UNITS: 100 INJECTION, SOLUTION INTRAVENOUS; SUBCUTANEOUS at 17:45

## 2024-05-18 RX ADMIN — ACETAMINOPHEN PRN MG: 325 TABLET ORAL at 23:09

## 2024-05-19 VITALS — TEMPERATURE: 99 F | DIASTOLIC BLOOD PRESSURE: 70 MMHG | SYSTOLIC BLOOD PRESSURE: 155 MMHG | OXYGEN SATURATION: 88 %

## 2024-05-19 VITALS — DIASTOLIC BLOOD PRESSURE: 67 MMHG | SYSTOLIC BLOOD PRESSURE: 151 MMHG | OXYGEN SATURATION: 89 % | TEMPERATURE: 98.3 F

## 2024-05-19 VITALS — TEMPERATURE: 99.8 F | OXYGEN SATURATION: 92 % | DIASTOLIC BLOOD PRESSURE: 60 MMHG | SYSTOLIC BLOOD PRESSURE: 132 MMHG

## 2024-05-19 VITALS — DIASTOLIC BLOOD PRESSURE: 67 MMHG | SYSTOLIC BLOOD PRESSURE: 150 MMHG | TEMPERATURE: 98.2 F | OXYGEN SATURATION: 92 %

## 2024-05-19 VITALS — TEMPERATURE: 98.1 F | SYSTOLIC BLOOD PRESSURE: 148 MMHG | DIASTOLIC BLOOD PRESSURE: 62 MMHG | OXYGEN SATURATION: 91 %

## 2024-05-19 VITALS — OXYGEN SATURATION: 94 %

## 2024-05-19 VITALS — DIASTOLIC BLOOD PRESSURE: 54 MMHG | SYSTOLIC BLOOD PRESSURE: 144 MMHG | OXYGEN SATURATION: 90 % | TEMPERATURE: 98.5 F

## 2024-05-19 VITALS — OXYGEN SATURATION: 92 % | TEMPERATURE: 97.8 F | SYSTOLIC BLOOD PRESSURE: 158 MMHG | DIASTOLIC BLOOD PRESSURE: 89 MMHG

## 2024-05-19 VITALS — OXYGEN SATURATION: 98 % | SYSTOLIC BLOOD PRESSURE: 166 MMHG | TEMPERATURE: 98 F | DIASTOLIC BLOOD PRESSURE: 71 MMHG

## 2024-05-19 VITALS — OXYGEN SATURATION: 92 % | SYSTOLIC BLOOD PRESSURE: 142 MMHG | DIASTOLIC BLOOD PRESSURE: 54 MMHG | TEMPERATURE: 98.9 F

## 2024-05-19 VITALS — TEMPERATURE: 97.6 F | DIASTOLIC BLOOD PRESSURE: 67 MMHG | OXYGEN SATURATION: 90 % | SYSTOLIC BLOOD PRESSURE: 146 MMHG

## 2024-05-19 VITALS — DIASTOLIC BLOOD PRESSURE: 68 MMHG | TEMPERATURE: 98.1 F | SYSTOLIC BLOOD PRESSURE: 160 MMHG | OXYGEN SATURATION: 92 %

## 2024-05-19 VITALS — OXYGEN SATURATION: 92 %

## 2024-05-19 VITALS — DIASTOLIC BLOOD PRESSURE: 71 MMHG | TEMPERATURE: 98 F | OXYGEN SATURATION: 92 % | SYSTOLIC BLOOD PRESSURE: 161 MMHG

## 2024-05-19 VITALS — OXYGEN SATURATION: 90 %

## 2024-05-19 VITALS — OXYGEN SATURATION: 88 %

## 2024-05-19 VITALS — OXYGEN SATURATION: 91 %

## 2024-05-19 VITALS — SYSTOLIC BLOOD PRESSURE: 144 MMHG | DIASTOLIC BLOOD PRESSURE: 50 MMHG

## 2024-05-19 VITALS — OXYGEN SATURATION: 89 %

## 2024-05-19 VITALS — TEMPERATURE: 98.2 F | OXYGEN SATURATION: 91 % | DIASTOLIC BLOOD PRESSURE: 71 MMHG | SYSTOLIC BLOOD PRESSURE: 164 MMHG

## 2024-05-19 VITALS — OXYGEN SATURATION: 91 % | TEMPERATURE: 97.9 F | SYSTOLIC BLOOD PRESSURE: 169 MMHG | DIASTOLIC BLOOD PRESSURE: 74 MMHG

## 2024-05-19 VITALS — OXYGEN SATURATION: 93 %

## 2024-05-19 VITALS — OXYGEN SATURATION: 95 %

## 2024-05-19 LAB
BASOPHILS # BLD AUTO: 0 10^3/UL (ref 0–0.2)
BASOPHILS NFR BLD AUTO: 0 % (ref 0–1)
BUN SERPL-MCNC: 59 MG/DL (ref 9–23)
CALCIUM SERPL-MCNC: 7.3 MG/DL (ref 8.3–10.6)
CHLORIDE SERPL-SCNC: 106 MMOL/L (ref 98–107)
CO2 SERPL-SCNC: 25 MMOL/L (ref 20–31)
CREAT SERPL-MCNC: 2.12 MG/DL (ref 0.7–1.3)
EOSINOPHIL # BLD AUTO: 0 10^3/UL (ref 0–0.5)
EOSINOPHIL NFR BLD AUTO: 0 % (ref 0–3)
EST. AVERAGE GLUCOSE BLD GHB EST-MCNC: 137 MG/DL (ref 60–110)
FOLATE SERPL-MCNC: 18.6 NG/ML (ref 5.4–?)
GFR SERPL CREATININE-BSD FRML MDRD: 31.5 ML/MIN/{1.73_M2} (ref 35–?)
GLUCOSE SERPL-MCNC: 166 MG/DL (ref 74–106)
HCT VFR BLD AUTO: 22.6 % (ref 42–52)
HCT VFR BLD AUTO: 23 % (ref 42–52)
HCT VFR BLD AUTO: 23.3 % (ref 42–52)
HCT VFR BLD AUTO: 30.5 % (ref 42–52)
HGB BLD-MCNC: 10.5 G/DL (ref 13.5–17.5)
HGB BLD-MCNC: 7.7 G/DL (ref 13.5–17.5)
HGB BLD-MCNC: 7.7 G/DL (ref 13.5–17.5)
HGB BLD-MCNC: 8 G/DL (ref 13.5–17.5)
LYMPHOCYTES # BLD AUTO: 1.1 10^3/UL (ref 1.5–5)
LYMPHOCYTES NFR BLD AUTO: 8.9 % (ref 24–44)
MCH RBC QN AUTO: 34.8 PG (ref 27–33)
MCHC RBC AUTO-ENTMCNC: 34.1 G/DL (ref 32–36.5)
MCV RBC AUTO: 102.3 FL (ref 80–96)
MONOCYTES # BLD AUTO: 1.1 10^3/UL (ref 0–0.8)
MONOCYTES NFR BLD AUTO: 8.9 % (ref 2–8)
NEUTROPHILS # BLD AUTO: 9.3 10^3/UL (ref 1.5–8.5)
NEUTROPHILS NFR BLD AUTO: 78.5 % (ref 36–66)
PLATELET # BLD AUTO: 169 10^3/UL (ref 150–450)
POTASSIUM SERPL-SCNC: 4.1 MMOL/L (ref 3.5–5.1)
RBC # BLD AUTO: 2.21 10^6/UL (ref 4.3–6.1)
SODIUM SERPL-SCNC: 137 MMOL/L (ref 136–145)
VIT B12 SERPL-MCNC: 672 PG/ML (ref 211–911)
WBC # BLD AUTO: 11.9 10^3/UL (ref 4–10)

## 2024-05-19 PROCEDURE — B246ZZZ ULTRASONOGRAPHY OF RIGHT AND LEFT HEART: ICD-10-PCS | Performed by: INTERNAL MEDICINE

## 2024-05-19 PROCEDURE — 30233N1 TRANSFUSION OF NONAUTOLOGOUS RED BLOOD CELLS INTO PERIPHERAL VEIN, PERCUTANEOUS APPROACH: ICD-10-PCS | Performed by: GENERAL PRACTICE

## 2024-05-19 RX ADMIN — SODIUM CHLORIDE SCH MLS/HR: 9 INJECTION, SOLUTION INTRAVENOUS at 07:45

## 2024-05-19 RX ADMIN — TIMOLOL MALEATE SCH DROP: 5 SOLUTION OPHTHALMIC at 20:28

## 2024-05-19 RX ADMIN — RAMELTEON PRN MG: 8 TABLET ORAL at 20:52

## 2024-05-19 RX ADMIN — BRIMONIDINE TARTRATE SCH DROP: 1.5 SOLUTION OPHTHALMIC at 20:27

## 2024-05-19 RX ADMIN — INSULIN DETEMIR SCH UNITS: 100 INJECTION, SOLUTION SUBCUTANEOUS at 08:53

## 2024-05-20 VITALS — SYSTOLIC BLOOD PRESSURE: 164 MMHG | DIASTOLIC BLOOD PRESSURE: 83 MMHG | OXYGEN SATURATION: 94 % | TEMPERATURE: 97.7 F

## 2024-05-20 VITALS — OXYGEN SATURATION: 97 %

## 2024-05-20 VITALS — OXYGEN SATURATION: 93 %

## 2024-05-20 VITALS — OXYGEN SATURATION: 94 %

## 2024-05-20 VITALS — DIASTOLIC BLOOD PRESSURE: 66 MMHG | TEMPERATURE: 97.9 F | SYSTOLIC BLOOD PRESSURE: 172 MMHG | OXYGEN SATURATION: 94 %

## 2024-05-20 VITALS — OXYGEN SATURATION: 89 %

## 2024-05-20 VITALS — OXYGEN SATURATION: 92 %

## 2024-05-20 VITALS — OXYGEN SATURATION: 90 %

## 2024-05-20 VITALS — TEMPERATURE: 97.7 F | DIASTOLIC BLOOD PRESSURE: 69 MMHG | SYSTOLIC BLOOD PRESSURE: 167 MMHG | OXYGEN SATURATION: 91 %

## 2024-05-20 VITALS — OXYGEN SATURATION: 95 %

## 2024-05-20 VITALS — OXYGEN SATURATION: 91 %

## 2024-05-20 VITALS — DIASTOLIC BLOOD PRESSURE: 62 MMHG | OXYGEN SATURATION: 93 % | SYSTOLIC BLOOD PRESSURE: 166 MMHG

## 2024-05-20 LAB
25(OH)D3 SERPL-MCNC: 63.7 NG/ML (ref 20–100)
ANISOCYTOSIS BLD QL SMEAR: (no result)
BUN SERPL-MCNC: 51 MG/DL (ref 9–23)
CALCIUM SERPL-MCNC: 7.5 MG/DL (ref 8.3–10.6)
CHLORIDE SERPL-SCNC: 110 MMOL/L (ref 98–107)
CO2 SERPL-SCNC: 27 MMOL/L (ref 20–31)
CREAT SERPL-MCNC: 1.62 MG/DL (ref 0.7–1.3)
GFR SERPL CREATININE-BSD FRML MDRD: 43 ML/MIN/{1.73_M2} (ref 35–?)
GLUCOSE SERPL-MCNC: 87 MG/DL (ref 74–106)
HCT VFR BLD AUTO: 32.5 % (ref 42–52)
HGB BLD-MCNC: 11 G/DL (ref 13.5–17.5)
LYMPHOCYTES NFR BLD MANUAL: 24 % (ref 16–44)
MCH RBC QN AUTO: 33.3 PG (ref 27–33)
MCHC RBC AUTO-ENTMCNC: 33.8 G/DL (ref 32–36.5)
MCV RBC AUTO: 98.5 FL (ref 80–96)
MONOCYTES NFR BLD MANUAL: 14 % (ref 0–5)
NEUTROPHILS NFR BLD MANUAL: 59 % (ref 28–66)
PLATELET # BLD AUTO: 145 10^3/UL (ref 150–450)
PLATELET BLD QL SMEAR: NORMAL
POIKILOCYTOSIS BLD QL SMEAR: (no result)
POLYCHROMASIA BLD QL SMEAR: (no result)
POTASSIUM SERPL-SCNC: 4.4 MMOL/L (ref 3.5–5.1)
PROT 24H UR-MRATE: 2427.2 MG/24HR (ref 50–80)
PROT UR-MCNC: 138.7 MG/DL (ref 0–14)
RBC # BLD AUTO: 3.3 10^6/UL (ref 4.3–6.1)
SCHISTOCYTES BLD QL SMEAR: (no result)
SODIUM SERPL-SCNC: 142 MMOL/L (ref 136–145)
WBC # BLD AUTO: 8.6 10^3/UL (ref 4–10)

## 2024-05-20 RX ADMIN — GLYCOPYRROLATE SCH MG: 0.2 INJECTION, SOLUTION INTRAMUSCULAR; INTRAVENOUS at 19:50

## 2024-05-20 RX ADMIN — TIOTROPIUM BROMIDE SCH INHALATION: 18 CAPSULE ORAL; RESPIRATORY (INHALATION) at 14:57

## 2024-05-20 RX ADMIN — FORMOTEROL FUMARATE DIHYDRATE SCH MCG: 20 SOLUTION RESPIRATORY (INHALATION) at 19:50

## 2024-05-20 RX ADMIN — ISOSORBIDE DINITRATE SCH MG: 20 TABLET ORAL at 09:09

## 2024-05-20 RX ADMIN — METHYLPREDNISOLONE SODIUM SUCCINATE SCH MG: 40 INJECTION, POWDER, FOR SOLUTION INTRAMUSCULAR; INTRAVENOUS at 17:43

## 2024-05-21 VITALS — DIASTOLIC BLOOD PRESSURE: 65 MMHG | OXYGEN SATURATION: 92 % | TEMPERATURE: 97.5 F | SYSTOLIC BLOOD PRESSURE: 139 MMHG

## 2024-05-21 VITALS — DIASTOLIC BLOOD PRESSURE: 64 MMHG | SYSTOLIC BLOOD PRESSURE: 140 MMHG

## 2024-05-21 VITALS — SYSTOLIC BLOOD PRESSURE: 162 MMHG | OXYGEN SATURATION: 91 % | TEMPERATURE: 97.9 F | DIASTOLIC BLOOD PRESSURE: 76 MMHG

## 2024-05-21 VITALS — SYSTOLIC BLOOD PRESSURE: 140 MMHG | DIASTOLIC BLOOD PRESSURE: 64 MMHG

## 2024-05-21 VITALS — OXYGEN SATURATION: 91 %

## 2024-05-21 VITALS — OXYGEN SATURATION: 90 %

## 2024-05-21 LAB
ANISOCYTOSIS BLD QL SMEAR: (no result)
BUN SERPL-MCNC: 52 MG/DL (ref 9–23)
CALCIUM SERPL-MCNC: 7.9 MG/DL (ref 8.3–10.6)
CHLORIDE SERPL-SCNC: 106 MMOL/L (ref 98–107)
CO2 SERPL-SCNC: 24 MMOL/L (ref 20–31)
CREAT SERPL-MCNC: 1.42 MG/DL (ref 0.7–1.3)
GFR SERPL CREATININE-BSD FRML MDRD: 50.1 ML/MIN/{1.73_M2} (ref 35–?)
GLUCOSE SERPL-MCNC: 192 MG/DL (ref 74–106)
HCT VFR BLD AUTO: 34.8 % (ref 42–52)
HGB BLD-MCNC: 11.9 G/DL (ref 13.5–17.5)
LYMPHOCYTES NFR BLD MANUAL: 10 % (ref 16–44)
MCH RBC QN AUTO: 32.9 PG (ref 27–33)
MCHC RBC AUTO-ENTMCNC: 34.2 G/DL (ref 32–36.5)
MCV RBC AUTO: 96.1 FL (ref 80–96)
METAMYELOCYTES NFR BLD MANUAL: 1 % (ref 0–0)
MONOCYTES NFR BLD MANUAL: 2 % (ref 0–5)
NEUTROPHILS NFR BLD MANUAL: 84 % (ref 28–66)
PLATELET # BLD AUTO: 132 10^3/UL (ref 150–450)
PLATELET BLD QL SMEAR: (no result)
POTASSIUM SERPL-SCNC: 4.8 MMOL/L (ref 3.5–5.1)
RBC # BLD AUTO: 3.62 10^6/UL (ref 4.3–6.1)
SODIUM SERPL-SCNC: 139 MMOL/L (ref 136–145)
VARIANT LYMPHS NFR BLD MANUAL: 3 % (ref 0–5)
WBC # BLD AUTO: 7.7 10^3/UL (ref 4–10)

## 2024-05-21 RX ADMIN — HYDRALAZINE HYDROCHLORIDE SCH MG: 25 TABLET, FILM COATED ORAL at 13:49

## 2024-06-11 ENCOUNTER — HOSPITAL ENCOUNTER (OUTPATIENT)
Dept: HOSPITAL 53 - M PLALAB | Age: 89
End: 2024-06-11
Attending: FAMILY MEDICINE
Payer: MEDICARE

## 2024-06-11 DIAGNOSIS — N18.32: Primary | ICD-10-CM

## 2024-06-11 LAB
BASOPHILS # BLD AUTO: 0 10^3/UL (ref 0–0.2)
BASOPHILS NFR BLD AUTO: 0 % (ref 0–1)
BUN SERPL-MCNC: 34 MG/DL (ref 9–23)
CALCIUM SERPL-MCNC: 7.9 MG/DL (ref 8.3–10.6)
CHLORIDE SERPL-SCNC: 103 MMOL/L (ref 98–107)
CO2 SERPL-SCNC: 30 MMOL/L (ref 20–31)
CREAT SERPL-MCNC: 1.44 MG/DL (ref 0.7–1.3)
EOSINOPHIL # BLD AUTO: 0 10^3/UL (ref 0–0.5)
EOSINOPHIL NFR BLD AUTO: 0 % (ref 0–3)
GFR SERPL CREATININE-BSD FRML MDRD: 49.3 ML/MIN/{1.73_M2} (ref 35–?)
GLUCOSE SERPL-MCNC: 186 MG/DL (ref 74–106)
HCT VFR BLD AUTO: 32.1 % (ref 42–52)
HGB BLD-MCNC: 10.5 G/DL (ref 13.5–17.5)
LYMPHOCYTES # BLD AUTO: 0.8 10^3/UL (ref 1.5–5)
LYMPHOCYTES NFR BLD AUTO: 14.7 % (ref 24–44)
MCH RBC QN AUTO: 33.1 PG (ref 27–33)
MCHC RBC AUTO-ENTMCNC: 32.7 G/DL (ref 32–36.5)
MCV RBC AUTO: 101.3 FL (ref 80–96)
MONOCYTES # BLD AUTO: 0.7 10^3/UL (ref 0–0.8)
MONOCYTES NFR BLD AUTO: 13.1 % (ref 2–8)
NEUTROPHILS # BLD AUTO: 3.7 10^3/UL (ref 1.5–8.5)
NEUTROPHILS NFR BLD AUTO: 69.9 % (ref 36–66)
PLATELET # BLD AUTO: 196 10^3/UL (ref 150–450)
POTASSIUM SERPL-SCNC: 4.4 MMOL/L (ref 3.5–5.1)
RBC # BLD AUTO: 3.17 10^6/UL (ref 4.3–6.1)
SODIUM SERPL-SCNC: 137 MMOL/L (ref 136–145)
WBC # BLD AUTO: 5.3 10^3/UL (ref 4–10)

## 2024-06-26 ENCOUNTER — HOSPITAL ENCOUNTER (OUTPATIENT)
Dept: HOSPITAL 53 - M SHH | Age: 89
End: 2024-06-26
Attending: FAMILY MEDICINE
Payer: MEDICARE

## 2024-06-26 DIAGNOSIS — R30.0: Primary | ICD-10-CM

## 2024-06-26 LAB
APPEARANCE UR: (no result)
BACTERIA UR QL AUTO: NEGATIVE
BILIRUB UR QL STRIP.AUTO: NEGATIVE
GLUCOSE UR QL STRIP.AUTO: NEGATIVE MG/DL
HGB UR QL STRIP.AUTO: (no result)
KETONES UR QL STRIP.AUTO: NEGATIVE MG/DL
LEUKOCYTE ESTERASE UR QL STRIP.AUTO: (no result)
NITRITE UR QL STRIP.AUTO: NEGATIVE
PH UR STRIP.AUTO: 6 UNITS (ref 5–9)
PROT UR QL STRIP.AUTO: (no result) MG/DL
RBC # UR AUTO: 21 /HPF (ref 0–3)
SP GR UR STRIP.AUTO: 1.01 (ref 1–1.03)
SQUAMOUS #/AREA URNS AUTO: 1 /HPF (ref 0–6)
UROBILINOGEN UR QL STRIP.AUTO: 0.2 MG/DL (ref 0–2)
WBC #/AREA URNS AUTO: (no result) /HPF (ref 0–3)